# Patient Record
Sex: MALE | Race: BLACK OR AFRICAN AMERICAN | NOT HISPANIC OR LATINO
[De-identification: names, ages, dates, MRNs, and addresses within clinical notes are randomized per-mention and may not be internally consistent; named-entity substitution may affect disease eponyms.]

---

## 2017-05-23 PROBLEM — Z00.00 ENCOUNTER FOR PREVENTIVE HEALTH EXAMINATION: Status: ACTIVE | Noted: 2017-05-23

## 2017-06-23 ENCOUNTER — APPOINTMENT (OUTPATIENT)
Dept: SURGERY | Facility: CLINIC | Age: 82
End: 2017-06-23

## 2017-06-23 VITALS
TEMPERATURE: 98 F | HEIGHT: 68 IN | WEIGHT: 154 LBS | OXYGEN SATURATION: 97 % | BODY MASS INDEX: 23.34 KG/M2 | HEART RATE: 66 BPM | DIASTOLIC BLOOD PRESSURE: 75 MMHG | SYSTOLIC BLOOD PRESSURE: 117 MMHG

## 2017-06-23 DIAGNOSIS — Z87.891 PERSONAL HISTORY OF NICOTINE DEPENDENCE: ICD-10-CM

## 2017-12-15 ENCOUNTER — APPOINTMENT (OUTPATIENT)
Dept: SURGERY | Facility: CLINIC | Age: 82
End: 2017-12-15
Payer: MEDICARE

## 2017-12-15 VITALS
BODY MASS INDEX: 23.41 KG/M2 | TEMPERATURE: 97.7 F | SYSTOLIC BLOOD PRESSURE: 138 MMHG | DIASTOLIC BLOOD PRESSURE: 88 MMHG | WEIGHT: 154.44 LBS | HEART RATE: 72 BPM | OXYGEN SATURATION: 96 % | HEIGHT: 68 IN

## 2017-12-15 PROCEDURE — 99213 OFFICE O/P EST LOW 20 MIN: CPT

## 2018-01-26 ENCOUNTER — APPOINTMENT (OUTPATIENT)
Dept: CT IMAGING | Facility: HOSPITAL | Age: 83
End: 2018-01-26

## 2018-01-26 ENCOUNTER — OUTPATIENT (OUTPATIENT)
Dept: OUTPATIENT SERVICES | Facility: HOSPITAL | Age: 83
LOS: 1 days | End: 2018-01-26
Payer: MEDICARE

## 2018-01-26 PROCEDURE — 71275 CT ANGIOGRAPHY CHEST: CPT

## 2018-01-26 PROCEDURE — 71275 CT ANGIOGRAPHY CHEST: CPT | Mod: 26

## 2018-01-26 PROCEDURE — 74174 CTA ABD&PLVS W/CONTRAST: CPT

## 2018-01-26 PROCEDURE — 74174 CTA ABD&PLVS W/CONTRAST: CPT | Mod: 26

## 2018-04-03 ENCOUNTER — APPOINTMENT (OUTPATIENT)
Dept: CT IMAGING | Facility: HOSPITAL | Age: 83
End: 2018-04-03
Payer: MEDICARE

## 2018-04-03 ENCOUNTER — OUTPATIENT (OUTPATIENT)
Dept: OUTPATIENT SERVICES | Facility: HOSPITAL | Age: 83
LOS: 1 days | End: 2018-04-03
Payer: MEDICARE

## 2018-04-03 PROCEDURE — 71275 CT ANGIOGRAPHY CHEST: CPT

## 2018-04-03 PROCEDURE — 71275 CT ANGIOGRAPHY CHEST: CPT | Mod: 26

## 2018-05-09 ENCOUNTER — FORM ENCOUNTER (OUTPATIENT)
Age: 83
End: 2018-05-09

## 2018-05-09 ENCOUNTER — INPATIENT (INPATIENT)
Facility: HOSPITAL | Age: 83
LOS: 3 days | Discharge: HOME | DRG: 378 | End: 2018-05-13
Attending: SURGERY | Admitting: SURGERY
Payer: MEDICARE

## 2018-05-09 VITALS
HEART RATE: 94 BPM | DIASTOLIC BLOOD PRESSURE: 74 MMHG | TEMPERATURE: 98 F | WEIGHT: 151.9 LBS | SYSTOLIC BLOOD PRESSURE: 134 MMHG | OXYGEN SATURATION: 98 % | RESPIRATION RATE: 14 BRPM

## 2018-05-09 LAB
CK MB CFR SERPL CALC: 5.4 NG/ML — SIGNIFICANT CHANGE UP (ref 0–6.7)
CK SERPL-CCNC: 276 U/L — HIGH (ref 30–200)
HCT VFR BLD CALC: 26.8 % — LOW (ref 39–50)
HCT VFR BLD CALC: 27.1 % — LOW (ref 39–50)
HGB BLD-MCNC: 8.8 G/DL — LOW (ref 13–17)
HGB BLD-MCNC: 9 G/DL — LOW (ref 13–17)
MCHC RBC-ENTMCNC: 31.9 PG — SIGNIFICANT CHANGE UP (ref 27–34)
MCHC RBC-ENTMCNC: 32 PG — SIGNIFICANT CHANGE UP (ref 27–34)
MCHC RBC-ENTMCNC: 32.5 G/DL — SIGNIFICANT CHANGE UP (ref 32–36)
MCHC RBC-ENTMCNC: 33.6 G/DL — SIGNIFICANT CHANGE UP (ref 32–36)
MCV RBC AUTO: 95 FL — SIGNIFICANT CHANGE UP (ref 80–100)
MCV RBC AUTO: 98.5 FL — SIGNIFICANT CHANGE UP (ref 80–100)
NT-PROBNP SERPL-SCNC: 47 PG/ML — SIGNIFICANT CHANGE UP (ref 0–300)
PLATELET # BLD AUTO: 157 K/UL — SIGNIFICANT CHANGE UP (ref 150–400)
PLATELET # BLD AUTO: 161 K/UL — SIGNIFICANT CHANGE UP (ref 150–400)
RBC # BLD: 2.75 M/UL — LOW (ref 4.2–5.8)
RBC # BLD: 2.82 M/UL — LOW (ref 4.2–5.8)
RBC # FLD: 14.1 % — SIGNIFICANT CHANGE UP (ref 10.3–16.9)
RBC # FLD: 14.2 % — SIGNIFICANT CHANGE UP (ref 10.3–16.9)
TROPONIN T SERPL-MCNC: <0.01 NG/ML — SIGNIFICANT CHANGE UP (ref 0–0.01)
WBC # BLD: 10.8 K/UL — HIGH (ref 3.8–10.5)
WBC # BLD: 11.2 K/UL — HIGH (ref 3.8–10.5)
WBC # FLD AUTO: 10.8 K/UL — HIGH (ref 3.8–10.5)
WBC # FLD AUTO: 11.2 K/UL — HIGH (ref 3.8–10.5)

## 2018-05-09 PROCEDURE — 71045 X-RAY EXAM CHEST 1 VIEW: CPT | Mod: 26

## 2018-05-09 PROCEDURE — 93010 ELECTROCARDIOGRAM REPORT: CPT | Mod: 77

## 2018-05-09 PROCEDURE — 93010 ELECTROCARDIOGRAM REPORT: CPT

## 2018-05-09 PROCEDURE — 99285 EMERGENCY DEPT VISIT HI MDM: CPT | Mod: 25

## 2018-05-09 RX ORDER — SOD SULF/SODIUM/NAHCO3/KCL/PEG
4000 SOLUTION, RECONSTITUTED, ORAL ORAL ONCE
Qty: 0 | Refills: 0 | Status: COMPLETED | OUTPATIENT
Start: 2018-05-09 | End: 2018-05-09

## 2018-05-09 RX ORDER — SODIUM CHLORIDE 9 MG/ML
1000 INJECTION INTRAMUSCULAR; INTRAVENOUS; SUBCUTANEOUS ONCE
Qty: 0 | Refills: 0 | Status: COMPLETED | OUTPATIENT
Start: 2018-05-09 | End: 2018-05-09

## 2018-05-09 RX ORDER — ONDANSETRON 8 MG/1
4 TABLET, FILM COATED ORAL EVERY 6 HOURS
Qty: 0 | Refills: 0 | Status: DISCONTINUED | OUTPATIENT
Start: 2018-05-09 | End: 2018-05-13

## 2018-05-09 RX ORDER — SODIUM CHLORIDE 9 MG/ML
1000 INJECTION INTRAMUSCULAR; INTRAVENOUS; SUBCUTANEOUS
Qty: 0 | Refills: 0 | Status: DISCONTINUED | OUTPATIENT
Start: 2018-05-09 | End: 2018-05-12

## 2018-05-09 RX ORDER — SOD SULF/SODIUM/NAHCO3/KCL/PEG
4000 SOLUTION, RECONSTITUTED, ORAL ORAL ONCE
Qty: 0 | Refills: 0 | Status: DISCONTINUED | OUTPATIENT
Start: 2018-05-09 | End: 2018-05-09

## 2018-05-09 RX ORDER — AMLODIPINE BESYLATE 2.5 MG/1
5 TABLET ORAL DAILY
Qty: 0 | Refills: 0 | Status: DISCONTINUED | OUTPATIENT
Start: 2018-05-09 | End: 2018-05-13

## 2018-05-09 RX ADMIN — Medication 4000 MILLILITER(S): at 20:55

## 2018-05-09 RX ADMIN — SODIUM CHLORIDE 1000 MILLILITER(S): 9 INJECTION INTRAMUSCULAR; INTRAVENOUS; SUBCUTANEOUS at 12:37

## 2018-05-09 NOTE — PATIENT PROFILE ADULT. - TEACHING/LEARNING FACTORS INFLUENCE READINESS TO LEARN
I called patient's pharmacy about the 3 faxes they sent us regarding Dexilant not being covered. Pharmacist said to disregard these. They were able to get Dexilant to go through without any charge.    none

## 2018-05-09 NOTE — CONSULT NOTE ADULT - SUBJECTIVE AND OBJECTIVE BOX
84M with a h/o GERALD, HTN, left inguinal hernia (pending surgery with Dr. Dale in 6 months), cataracts s/p surgery, h/o internal hemorrhoids, on baby 81 daily who p/w 2 episodes of rectal bleeding this morning,          REVIEW OF SYSTEMS:  Constitutional: No fever, weight loss or fatigue  ENMT:  No difficulty hearing, tinnitus, vertigo; No sinus or throat pain  Respiratory: No cough, wheezing, chills or hemoptysis  Cardiovascular: No chest pain, palpitations, dizziness or leg swelling  Gastrointestinal: No abdominal or epigastric pain. No nausea, vomiting or hematemesis; No diarrhea or constipation. No melena or hematochezia.  Skin: No itching, burning, rashes or lesions   Musculoskeletal: No joint pain or swelling; No muscle, back or extremity pain    PAST MEDICAL & SURGICAL HISTORY:  Sleep apnea  Hernia  Hemorrhoids  Hypertension      FAMILY HISTORY:      SOCIAL HISTORY:  Smoking Status: [ ] Current, [ ] Former, [ ] Never  Pack Years:    MEDICATIONS:  MEDICATIONS  (STANDING):    MEDICATIONS  (PRN):      Allergies    No Known Allergies    Intolerances        Vital Signs Last 24 Hrs  T(C): 36.7 (09 May 2018 13:17), Max: 36.7 (09 May 2018 13:17)  T(F): 98.1 (09 May 2018 13:17), Max: 98.1 (09 May 2018 13:17)  HR: 64 (09 May 2018 13:17) (64 - 94)  BP: 120/71 (09 May 2018 13:17) (120/71 - 134/74)  BP(mean): --  RR: 16 (09 May 2018 13:17) (14 - 16)  SpO2: 98% (09 May 2018 13:17) (98% - 98%)        PHYSICAL EXAM:    General: Well developed; well nourished; in no acute distress  HEENT: MMM, conjunctiva and sclera clear  Gastrointestinal: Soft, non-tender non-distended; Normal bowel sounds; No rebound or guarding  Extremities: Normal range of motion, No clubbing, cyanosis or edema  Neurological: Alert and oriented x3  Skin: Warm and dry. No obvious rash      LABS:                        9.0    10.8  )-----------( 157      ( 09 May 2018 13:16 )             26.8     05-09    141  |  102  |  18  ----------------------------<  122<H>  5.1   |  27  |  0.88    Ca    9.1      09 May 2018 10:56    TPro  5.9<L>  /  Alb  3.7  /  TBili  0.4  /  DBili  x   /  AST  21  /  ALT  16  /  AlkPhos  53  05-09        RADIOLOGY & ADDITIONAL STUDIES: 84M with a h/o GERALD, HTN, left inguinal hernia (pending surgery with Dr. Dale in 6 months), cataracts s/p surgery, h/o internal hemorrhoids, on baby 81 daily who p/w 2 episodes of rectal bleeding this morning,    Patient reports 2 episodes of BRBPR this morning, first with stool and then blood without stool. This event was in proximity to episode of lightheadedness. He did not develop abdominal pain or n/v at any time during this.   Of note he reports intermittent light headedness since starting metoprolol recently. He had a colonoscopy within the last year for a similar episode that was much less sever and without lgihtheadedness. That exam showed hemorrhoids only, and no intervention was done. He currently feels well after transfusion.       REVIEW OF SYSTEMS:  Constitutional: No fever, weight loss or fatigue  ENMT:  No difficulty hearing, tinnitus, vertigo; No sinus or throat pain  Respiratory: No cough, wheezing, chills or hemoptysis  Cardiovascular: No chest pain, palpitations, dizziness or leg swelling  Gastrointestinal: No abdominal or epigastric pain. No nausea, vomiting or hematemesis; No diarrhea or constipation. No melena   Skin: No itching, burning, rashes or lesions   Musculoskeletal: No joint pain or swelling; No muscle, back or extremity pain    PAST MEDICAL & SURGICAL HISTORY:  Sleep apnea  Hernia  Hemorrhoids  Hypertension      FAMILY HISTORY:      SOCIAL HISTORY:  Smoking Status: [ ] Current, [ ] Former, [ ] Never  Pack Years:    MEDICATIONS:  MEDICATIONS  (STANDING):    MEDICATIONS  (PRN):      Allergies    No Known Allergies    Intolerances        Vital Signs Last 24 Hrs  T(C): 36.7 (09 May 2018 13:17), Max: 36.7 (09 May 2018 13:17)  T(F): 98.1 (09 May 2018 13:17), Max: 98.1 (09 May 2018 13:17)  HR: 64 (09 May 2018 13:17) (64 - 94)  BP: 120/71 (09 May 2018 13:17) (120/71 - 134/74)  BP(mean): --  RR: 16 (09 May 2018 13:17) (14 - 16)  SpO2: 98% (09 May 2018 13:17) (98% - 98%)        PHYSICAL EXAM:    General: Well developed; well nourished; in no acute distress  HEENT: MMM, conjunctiva and sclera clear  Gastrointestinal: Soft, non-tender non-distended; Normal bowel sounds; No rebound or guarding  rectal - mucous with small amount of red blood  Extremities: Normal range of motion, No clubbing, cyanosis or edema  Neurological: Alert and oriented x3  Skin: Warm and dry. No obvious rash      LABS:                        9.0    10.8  )-----------( 157      ( 09 May 2018 13:16 )             26.8     05-09    141  |  102  |  18  ----------------------------<  122<H>  5.1   |  27  |  0.88    Ca    9.1      09 May 2018 10:56    TPro  5.9<L>  /  Alb  3.7  /  TBili  0.4  /  DBili  x   /  AST  21  /  ALT  16  /  AlkPhos  53  05-09        RADIOLOGY & ADDITIONAL STUDIES:

## 2018-05-09 NOTE — H&P ADULT - NSHPPHYSICALEXAM_GEN_ALL_CORE
Vital Signs Last 24 Hrs  T(C): 36.7 (09 May 2018 13:17), Max: 36.7 (09 May 2018 13:17)  T(F): 98.1 (09 May 2018 13:17), Max: 98.1 (09 May 2018 13:17)  HR: 64 (09 May 2018 13:17) (64 - 94)  BP: 120/71 (09 May 2018 13:17) (120/71 - 134/74)  BP(mean): --  RR: 16 (09 May 2018 13:17) (14 - 16)  SpO2: 98% (09 May 2018 13:17) (98% - 98%)    Gen: Age appropriate male in NAD  CV: RRR  Resp: No increased work of breathing  Abd: Softly distended but nontender  Rectal: No external hemorrhoids, fissures appreciated. Sphincter tone WNL. No internal masses appreciated. Nontender ALAN. No blood on gloved finger.  Ext: WWP

## 2018-05-09 NOTE — H&P ADULT - NSHPLABSRESULTS_GEN_ALL_CORE
9.0    10.8  )-----------( 157      ( 09 May 2018 13:16 )             26.8   05-09    141  |  102  |  18  ----------------------------<  122<H>  5.1   |  27  |  0.88    Ca    9.1      09 May 2018 10:56    TPro  5.9<L>  /  Alb  3.7  /  TBili  0.4  /  DBili  x   /  AST  21  /  ALT  16  /  AlkPhos  53  05-09

## 2018-05-09 NOTE — ED PROVIDER NOTE - OBJECTIVE STATEMENT
84M with a h/o GERALD< HTN, left inguinal hernia (pending surgery with Dr. Dale in 6 months), cataracts s/p surgery, h/o internal bleeding, no baby 81 daily who p/w 2 episodes of rectal bleeding this morning, first was stool mixed with blood and second was all blood, large volume, bright red mixed with maroon stool. He also felt dizzy and Lh today and the doorman reports he fell against the wall when he was walking on the lobby today prior to having 2nd bloody BM. No CP, no n/v. He does admit to feeling more LH and SOb in the last month since being started on Metoprolol 1 month ago by his cardiologist.   GI: Teja Perkins, [place], Dr. Puckett, PMD 84M with a h/o GERALD< HTN, left inguinal hernia (pending surgery with Dr. Dale in 6 months), cataracts s/p surgery, h/o internal hemorrhoids, on baby 81 daily who p/w 2 episodes of rectal bleeding this morning, first was stool mixed with blood and second was all blood, large volume, bright red mixed with maroon stool. He also felt dizzy and Lh today and the doorman reports he fell against the wall when he was walking on the lobby today prior to having 2nd bloody BM. No CP, no n/v. He does admit to feeling more LH and SOb in the last month since being started on Metoprolol 1 month ago by his cardiologist.   GI: Teja Perkins, [place], Dr. Puckett, PMD 84M with a h/o GERALD, HTN, left inguinal hernia (pending surgery with Dr. Dale in 6 months), cataracts s/p surgery, h/o internal hemorrhoids, on baby 81 daily who p/w 2 episodes of rectal bleeding this morning, first was stool mixed with blood and second was all blood, large volume, bright red mixed with maroon stool. He also felt dizzy and Lh today and the doorman reports he fell against the wall when he was walking on the lobby today prior to having 2nd bloody BM. No CP, NO abd pain, no n/v, He does admit to feeling more LH and SOb in the last month since being started on Metoprolol 1 month ago by his cardiologist.   GI: Teja Perkins, [place], Dr. Puckett, PMD

## 2018-05-09 NOTE — CONSULT NOTE ADULT - ASSESSMENT
84M with a h/o GERALD, HTN, left inguinal hernia (pending surgery with Dr. Dale in 6 months), cataracts s/p surgery, h/o internal hemorrhoids, on baby 81 daily who p/w 2 episodes of rectal bleeding this morning,    #brbpr - patient has a history of rectal bleeding from hemorrhoids, per previous colonoscopy report. this ervent was more signProximity of initiating metoprolol wiuth episodes of lightheadedness may represent a cause for ischemic colitis, however patient is not having abdominal pain. The degree of his anemia warrants endoscopic evaluation. absence of melena, relative stability and normal BUN are evidence against upper GI etiolgoy  -golytely prep and NPO after midnight for colonoscopy tomorrow  -repeat CBC 84M with a h/o GERALD, HTN, left inguinal hernia (pending surgery with Dr. Dale in 6 months), cataracts s/p surgery, h/o internal hemorrhoids, on baby 81 daily who p/w 2 episodes of rectal bleeding this morning,    #brbpr - patient has a history of rectal bleeding from hemorrhoids, per previous colonoscopy report. this ervent was more signProximity of initiating metoprolol wiuth episodes of lightheadedness may represent a cause for ischemic colitis, however patient is not having abdominal pain. The degree of his anemia warrants endoscopic evaluation. absence of melena, relative stability and normal BUN are evidence against upper GI etiolgoy  -golytely prep and NPO after midnight for colonoscopy tomorrow  -repeat CBC  -obtain records for outside CBC and c-scope

## 2018-05-09 NOTE — H&P ADULT - HISTORY OF PRESENT ILLNESS
84M with a PMHx of GERALD (uses CPAP at home), HTN, cataracts, COPD, Diverticulosis, Left inguinal hernia presenting to the [facility] ED with two episodes of hematochezia this morning.  Pt reports two episodes of primarily maroon colored stool at 6AM and 10AM today.  Pt was then brought in by his daughter for further evaluation.  He does note dizziness upon standing, but denies any chest pain, sob, nausea, vomiting, dysuria, constipation.  Pt denies any CAD hx.    PMH: HTN, GERALD, COPD, Diverticulosis, emphysema  Meds: ASA, timolol BID, amlodipine 5mg qd, Metoprolol ER 25mg qd (No longer taking)  PSH: No intraabdominal surgery  NKDA  Social: Remote smoking hx, denies ETOH/IVDA

## 2018-05-09 NOTE — H&P ADULT - ASSESSMENT
84M with hx of BRBPR and symptomatic anemia.    - Will admit to surgical service for observation.  - CLD this evening and mechanical prep for scope tomorrow with GI.  - Will continue home timolol eye drops, and home amlodipine, but will hold metoprolol  - Serial CBCs  - SCDs/OOBA  - Discussed with Dr. Hernandez.

## 2018-05-09 NOTE — ED PROVIDER NOTE - MEDICAL DECISION MAKING DETAILS
84M with above past medical hx who p/w rectal bleeding. VSS, no abdominal TTP, rectal with dark red blood, no active hemorrage. Will work up with labs, EKG, GI and surg consult. Likely admit.

## 2018-05-09 NOTE — ED PROVIDER NOTE - PHYSICAL EXAMINATION
GEN: Well appearing, well nourished, awake, alert, oriented to person, place, time/situation and in no apparent distress.  ENT: Airway patent, Nasal mucosa clear. Mouth with normal mucosa.  EYES: Clear bilaterally.  RESPIRATORY: Breathing comfortably with normal RR. No w/c/r.  CARDIAC: Regular rate and rhythm, no m/r/g.  ABDOMEN: Soft, nontender, +bowel sounds, no rebound, rigidity, or guarding. Nonsurgical ab, no incarcerated hernia palpated.  RECTAL: dark red blood on gloved finger, no active hemorrhage.   MSK: Range of motion is not limited, no deformities noted.  NEURO: Alert and oriented, no focal deficits.  SKIN: Skin normal color for race, warm, dry and intact. No evidence of rash.  PSYCH: Alert and oriented to person, place, time/situation. normal mood and affect. no apparent risk to self or others.

## 2018-05-09 NOTE — ED ADULT NURSE NOTE - OBJECTIVE STATEMENT
pt had 2 episodes of bloody diarrhea this am ,also having episodes of dizziness on and off x 1 month ,also had near syncopal episodes this am and yesterday

## 2018-05-10 LAB
ANION GAP SERPL CALC-SCNC: 10 MMOL/L — SIGNIFICANT CHANGE UP (ref 5–17)
APTT BLD: 25 SEC — LOW (ref 27.5–37.4)
BUN SERPL-MCNC: 10 MG/DL — SIGNIFICANT CHANGE UP (ref 7–23)
CALCIUM SERPL-MCNC: 8.4 MG/DL — SIGNIFICANT CHANGE UP (ref 8.4–10.5)
CHLORIDE SERPL-SCNC: 104 MMOL/L — SIGNIFICANT CHANGE UP (ref 96–108)
CO2 SERPL-SCNC: 29 MMOL/L — SIGNIFICANT CHANGE UP (ref 22–31)
CREAT SERPL-MCNC: 0.78 MG/DL — SIGNIFICANT CHANGE UP (ref 0.5–1.3)
GLUCOSE SERPL-MCNC: 120 MG/DL — HIGH (ref 70–99)
HCT VFR BLD CALC: 23.2 % — LOW (ref 39–50)
HCT VFR BLD CALC: 26.5 % — LOW (ref 39–50)
HGB BLD-MCNC: 7.4 G/DL — LOW (ref 13–17)
HGB BLD-MCNC: 8.6 G/DL — LOW (ref 13–17)
MAGNESIUM SERPL-MCNC: 1.8 MG/DL — SIGNIFICANT CHANGE UP (ref 1.6–2.6)
MCHC RBC-ENTMCNC: 31.4 PG — SIGNIFICANT CHANGE UP (ref 27–34)
MCHC RBC-ENTMCNC: 31.6 PG — SIGNIFICANT CHANGE UP (ref 27–34)
MCHC RBC-ENTMCNC: 31.9 G/DL — LOW (ref 32–36)
MCHC RBC-ENTMCNC: 32.5 G/DL — SIGNIFICANT CHANGE UP (ref 32–36)
MCV RBC AUTO: 96.7 FL — SIGNIFICANT CHANGE UP (ref 80–100)
MCV RBC AUTO: 99.1 FL — SIGNIFICANT CHANGE UP (ref 80–100)
PHOSPHATE SERPL-MCNC: 3.2 MG/DL — SIGNIFICANT CHANGE UP (ref 2.5–4.5)
PLATELET # BLD AUTO: 134 K/UL — LOW (ref 150–400)
PLATELET # BLD AUTO: 144 K/UL — LOW (ref 150–400)
POTASSIUM SERPL-MCNC: 4.2 MMOL/L — SIGNIFICANT CHANGE UP (ref 3.5–5.3)
POTASSIUM SERPL-SCNC: 4.2 MMOL/L — SIGNIFICANT CHANGE UP (ref 3.5–5.3)
RBC # BLD: 2.34 M/UL — LOW (ref 4.2–5.8)
RBC # BLD: 2.74 M/UL — LOW (ref 4.2–5.8)
RBC # FLD: 14.4 % — SIGNIFICANT CHANGE UP (ref 10.3–16.9)
RBC # FLD: 15.3 % — SIGNIFICANT CHANGE UP (ref 10.3–16.9)
SODIUM SERPL-SCNC: 143 MMOL/L — SIGNIFICANT CHANGE UP (ref 135–145)
WBC # BLD: 10 K/UL — SIGNIFICANT CHANGE UP (ref 3.8–10.5)
WBC # BLD: 9.4 K/UL — SIGNIFICANT CHANGE UP (ref 3.8–10.5)
WBC # FLD AUTO: 10 K/UL — SIGNIFICANT CHANGE UP (ref 3.8–10.5)
WBC # FLD AUTO: 9.4 K/UL — SIGNIFICANT CHANGE UP (ref 3.8–10.5)

## 2018-05-10 NOTE — PROGRESS NOTE ADULT - ASSESSMENT
84M with hx of BRBPR and symptomatic anemia.    - Will admit to surgical service for observation.  - CLD   - mechanical prep for scope tomorrow with GI.  - Will continue home timolol eye drops, and home amlodipine, but will hold metoprolol  - Serial CBCs  - SCDs/OOBA 84M with hx of BRBPR and symptomatic anemia.    - Will admit to surgical service for observation.  - NPO for procedure  - mechanical prep for scope today with GI.  - Will continue home timolol eye drops, and home amlodipine, but will hold metoprolol  - Serial CBCs  - SCDs/OOBA

## 2018-05-10 NOTE — PROGRESS NOTE ADULT - SUBJECTIVE AND OBJECTIVE BOX
O/N: golytely given, NPO after midnight, CBC stable  5/9: admitted for rectal bleeding O/N: golytely given, NPO after midnight, CBC stable  5/9: admitted for rectal bleeding      SUBJECTIVE: Patient seen and examined bedside by chief resident. Pt admits to bright red blood per rectum throughout the night. he has drank the golytely in anticipation for scope today. denies nausea, vomiting, abdominal pain, sob, fever     amLODIPine   Tablet 5 milliGRAM(s) Oral daily      Vital Signs Last 24 Hrs  T(C): 37.1 (10 May 2018 05:11), Max: 37.1 (10 May 2018 05:11)  T(F): 98.7 (10 May 2018 05:11), Max: 98.7 (10 May 2018 05:11)  HR: 85 (10 May 2018 06:00) (64 - 98)  BP: 111/57 (10 May 2018 05:11) (111/57 - 134/74)  BP(mean): --  RR: 14 (10 May 2018 06:00) (14 - 17)  SpO2: 96% (10 May 2018 06:00) (96% - 100%)  I&O's Detail      General: NAD, resting comfortably in bed  C/V: NSR  Pulm: Nonlabored breathing, no respiratory distress  Abd: soft, NT/ND  Extrem: WWP, no edema, SCDs in place        LABS:                        8.8    11.2  )-----------( 161      ( 09 May 2018 20:31 )             27.1     05-09    141  |  102  |  18  ----------------------------<  122<H>  5.1   |  27  |  0.88    Ca    9.1      09 May 2018 10:56    TPro  5.9<L>  /  Alb  3.7  /  TBili  0.4  /  DBili  x   /  AST  21  /  ALT  16  /  AlkPhos  53  05-09    PT/INR - ( 09 May 2018 10:56 )   PT: 11.8 sec;   INR: 1.06          PTT - ( 10 May 2018 07:03 )  PTT:25.0 sec

## 2018-05-11 LAB
ANION GAP SERPL CALC-SCNC: 9 MMOL/L — SIGNIFICANT CHANGE UP (ref 5–17)
BUN SERPL-MCNC: 9 MG/DL — SIGNIFICANT CHANGE UP (ref 7–23)
CALCIUM SERPL-MCNC: 8.2 MG/DL — LOW (ref 8.4–10.5)
CHLORIDE SERPL-SCNC: 107 MMOL/L — SIGNIFICANT CHANGE UP (ref 96–108)
CO2 SERPL-SCNC: 26 MMOL/L — SIGNIFICANT CHANGE UP (ref 22–31)
CREAT SERPL-MCNC: 0.81 MG/DL — SIGNIFICANT CHANGE UP (ref 0.5–1.3)
GLUCOSE SERPL-MCNC: 119 MG/DL — HIGH (ref 70–99)
HCT VFR BLD CALC: 23.2 % — LOW (ref 39–50)
HCT VFR BLD CALC: 32.2 % — LOW (ref 39–50)
HGB BLD-MCNC: 10.9 G/DL — LOW (ref 13–17)
HGB BLD-MCNC: 7.8 G/DL — LOW (ref 13–17)
MAGNESIUM SERPL-MCNC: 1.9 MG/DL — SIGNIFICANT CHANGE UP (ref 1.6–2.6)
MCHC RBC-ENTMCNC: 31.3 PG — SIGNIFICANT CHANGE UP (ref 27–34)
MCHC RBC-ENTMCNC: 31.7 PG — SIGNIFICANT CHANGE UP (ref 27–34)
MCHC RBC-ENTMCNC: 33.6 G/DL — SIGNIFICANT CHANGE UP (ref 32–36)
MCHC RBC-ENTMCNC: 33.9 G/DL — SIGNIFICANT CHANGE UP (ref 32–36)
MCV RBC AUTO: 92.5 FL — SIGNIFICANT CHANGE UP (ref 80–100)
MCV RBC AUTO: 94.3 FL — SIGNIFICANT CHANGE UP (ref 80–100)
PHOSPHATE SERPL-MCNC: 3.3 MG/DL — SIGNIFICANT CHANGE UP (ref 2.5–4.5)
PLATELET # BLD AUTO: 135 K/UL — LOW (ref 150–400)
PLATELET # BLD AUTO: 135 K/UL — LOW (ref 150–400)
POTASSIUM SERPL-MCNC: 4.3 MMOL/L — SIGNIFICANT CHANGE UP (ref 3.5–5.3)
POTASSIUM SERPL-SCNC: 4.3 MMOL/L — SIGNIFICANT CHANGE UP (ref 3.5–5.3)
RBC # BLD: 2.46 M/UL — LOW (ref 4.2–5.8)
RBC # BLD: 3.48 M/UL — LOW (ref 4.2–5.8)
RBC # FLD: 15.7 % — SIGNIFICANT CHANGE UP (ref 10.3–16.9)
RBC # FLD: 16 % — SIGNIFICANT CHANGE UP (ref 10.3–16.9)
SODIUM SERPL-SCNC: 142 MMOL/L — SIGNIFICANT CHANGE UP (ref 135–145)
WBC # BLD: 11.1 K/UL — HIGH (ref 3.8–10.5)
WBC # BLD: 9 K/UL — SIGNIFICANT CHANGE UP (ref 3.8–10.5)
WBC # FLD AUTO: 11.1 K/UL — HIGH (ref 3.8–10.5)
WBC # FLD AUTO: 9 K/UL — SIGNIFICANT CHANGE UP (ref 3.8–10.5)

## 2018-05-11 RX ADMIN — AMLODIPINE BESYLATE 5 MILLIGRAM(S): 2.5 TABLET ORAL at 05:46

## 2018-05-11 NOTE — PROGRESS NOTE ADULT - ASSESSMENT
84M with hx of BRBPR and symptomatic anemia.    - Will admit to surgical service for observation.  - regualr diet   - scope today 5/10.  - Will continue home timolol eye drops, and home amlodipine, but will hold metoprolol  - Serial CBCs  - SCDs/OOBA 84M with hx of BRBPR and symptomatic anemia.    - NPO  - scoped on 5/10  - bleeding scan today 5/11  - Will continue home timolol eye drops, and home amlodipine, but will hold metoprolol  - Serial CBCs  - SCDs/OOBA

## 2018-05-11 NOTE — PROGRESS NOTE ADULT - ASSESSMENT
84M with a h/o GERALD, HTN, left inguinal hernia (pending surgery with Dr. Dale in 6 months), cataracts s/p surgery, h/o internal hemorrhoids, on baby 81 daily who p/w 2 episodes of rectal bleeding this morning,    #brbpr - patient has a history of rectal bleeding from hemorrhoids, per previous colonoscopy report. this ervent was more signProximity of initiating metoprolol wiuth episodes of lightheadedness may represent a cause for ischemic colitis, however patient is not having abdominal pain. absence of melena, relative stability and normal BUN are evidence against upper GI etiolgoy  s/p Colonsocopy showing multiple large mouthed diverticula in descending, transverse and ascending colon with clots, but no active bleeding after washing  overnight had recurrent bleeding (red blood), likely recurrent diverticular bleed. Team ordered bleeding scan, but if intermittent may not show  -f/u bleeding scan results  -consider IR evaluation with scrutiny of right colon based on degree of clots there and size of diverticula if bleeding scan is unrevealing

## 2018-05-11 NOTE — PROGRESS NOTE ADULT - SUBJECTIVE AND OBJECTIVE BOX
Pt seen and examined   patient had painless bleeding last night.   no abd pain or n/v, feeling well; getting blood now    REVIEW OF SYSTEMS:  Constitutional: No fever, weight loss or fatigue  Cardiovascular: No chest pain, palpitations, dizziness or leg swelling  Gastrointestinal: No abdominal or epigastric pain. No nausea, vomiting or hematemesis; No diarrhea or constipation. No melena or hematochezia.  Skin: No itching, burning, rashes or lesions       MEDICATIONS:  MEDICATIONS  (STANDING):  amLODIPine   Tablet 5 milliGRAM(s) Oral daily  sodium chloride 0.9%. 1000 milliLiter(s) (110 mL/Hr) IV Continuous <Continuous>    MEDICATIONS  (PRN):  ondansetron Injectable 4 milliGRAM(s) IV Push every 6 hours PRN Nausea      Allergies    No Known Allergies    Intolerances        Vital Signs Last 24 Hrs  T(C): 36.8 (11 May 2018 09:45), Max: 37.1 (10 May 2018 20:27)  T(F): 98.3 (11 May 2018 09:45), Max: 98.7 (10 May 2018 20:27)  HR: 74 (11 May 2018 09:45) (70 - 95)  BP: 111/63 (11 May 2018 09:45) (93/75 - 115/69)  BP(mean): --  RR: 16 (11 May 2018 09:45) (14 - 16)  SpO2: 98% (11 May 2018 09:45) (95% - 100%)    05-10 @ 07:01  -  05-11 @ 07:00  --------------------------------------------------------  IN: 1220 mL / OUT: 1050 mL / NET: 170 mL        PHYSICAL EXAM:    General: Well developed; well nourished; in no acute distress  HEENT: MMM, conjunctiva and sclera clear  Gastrointestinal: Soft non-tender non-distended; Normal bowel sounds; No hepatosplenomegaly. No rebound or guarding    Skin: Warm and dry. No obvious rash    LABS:  CBC Full  -  ( 11 May 2018 07:17 )  WBC Count : 9.0 K/uL  Hemoglobin : 7.8 g/dL  Hematocrit : 23.2 %  Platelet Count - Automated : 135 K/uL  Mean Cell Volume : 94.3 fL  Mean Cell Hemoglobin : 31.7 pg  Mean Cell Hemoglobin Concentration : 33.6 g/dL  Auto Neutrophil # : x  Auto Lymphocyte # : x  Auto Monocyte # : x  Auto Eosinophil # : x  Auto Basophil # : x  Auto Neutrophil % : x  Auto Lymphocyte % : x  Auto Monocyte % : x  Auto Eosinophil % : x  Auto Basophil % : x    05-11    142  |  107  |  9   ----------------------------<  119<H>  4.3   |  26  |  0.81    Ca    8.2<L>      11 May 2018 07:17  Phos  3.3     05-11  Mg     1.9     05-11      PTT - ( 10 May 2018 07:03 )  PTT:25.0 sec                RADIOLOGY & ADDITIONAL STUDIES:

## 2018-05-12 LAB
ANION GAP SERPL CALC-SCNC: 13 MMOL/L — SIGNIFICANT CHANGE UP (ref 5–17)
BUN SERPL-MCNC: 9 MG/DL — SIGNIFICANT CHANGE UP (ref 7–23)
CALCIUM SERPL-MCNC: 9 MG/DL — SIGNIFICANT CHANGE UP (ref 8.4–10.5)
CHLORIDE SERPL-SCNC: 104 MMOL/L — SIGNIFICANT CHANGE UP (ref 96–108)
CO2 SERPL-SCNC: 26 MMOL/L — SIGNIFICANT CHANGE UP (ref 22–31)
CREAT SERPL-MCNC: 0.82 MG/DL — SIGNIFICANT CHANGE UP (ref 0.5–1.3)
GLUCOSE SERPL-MCNC: 102 MG/DL — HIGH (ref 70–99)
HCT VFR BLD CALC: 31.4 % — LOW (ref 39–50)
HCT VFR BLD CALC: 34.9 % — LOW (ref 39–50)
HGB BLD-MCNC: 10.2 G/DL — LOW (ref 13–17)
HGB BLD-MCNC: 11.7 G/DL — LOW (ref 13–17)
MAGNESIUM SERPL-MCNC: 1.9 MG/DL — SIGNIFICANT CHANGE UP (ref 1.6–2.6)
MCHC RBC-ENTMCNC: 30.9 PG — SIGNIFICANT CHANGE UP (ref 27–34)
MCHC RBC-ENTMCNC: 31.4 PG — SIGNIFICANT CHANGE UP (ref 27–34)
MCHC RBC-ENTMCNC: 32.5 G/DL — SIGNIFICANT CHANGE UP (ref 32–36)
MCHC RBC-ENTMCNC: 33.5 G/DL — SIGNIFICANT CHANGE UP (ref 32–36)
MCV RBC AUTO: 93.6 FL — SIGNIFICANT CHANGE UP (ref 80–100)
MCV RBC AUTO: 95.2 FL — SIGNIFICANT CHANGE UP (ref 80–100)
PHOSPHATE SERPL-MCNC: 4 MG/DL — SIGNIFICANT CHANGE UP (ref 2.5–4.5)
PLATELET # BLD AUTO: 151 K/UL — SIGNIFICANT CHANGE UP (ref 150–400)
PLATELET # BLD AUTO: 158 K/UL — SIGNIFICANT CHANGE UP (ref 150–400)
POTASSIUM SERPL-MCNC: 4.3 MMOL/L — SIGNIFICANT CHANGE UP (ref 3.5–5.3)
POTASSIUM SERPL-SCNC: 4.3 MMOL/L — SIGNIFICANT CHANGE UP (ref 3.5–5.3)
RBC # BLD: 3.3 M/UL — LOW (ref 4.2–5.8)
RBC # BLD: 3.73 M/UL — LOW (ref 4.2–5.8)
RBC # FLD: 16.1 % — SIGNIFICANT CHANGE UP (ref 10.3–16.9)
RBC # FLD: 16.2 % — SIGNIFICANT CHANGE UP (ref 10.3–16.9)
SODIUM SERPL-SCNC: 143 MMOL/L — SIGNIFICANT CHANGE UP (ref 135–145)
WBC # BLD: 10 K/UL — SIGNIFICANT CHANGE UP (ref 3.8–10.5)
WBC # BLD: 9.6 K/UL — SIGNIFICANT CHANGE UP (ref 3.8–10.5)
WBC # FLD AUTO: 10 K/UL — SIGNIFICANT CHANGE UP (ref 3.8–10.5)
WBC # FLD AUTO: 9.6 K/UL — SIGNIFICANT CHANGE UP (ref 3.8–10.5)

## 2018-05-12 RX ORDER — MAGNESIUM SULFATE 500 MG/ML
1 VIAL (ML) INJECTION ONCE
Qty: 0 | Refills: 0 | Status: COMPLETED | OUTPATIENT
Start: 2018-05-12 | End: 2018-05-12

## 2018-05-12 RX ADMIN — Medication 100 GRAM(S): at 08:56

## 2018-05-12 NOTE — PROGRESS NOTE ADULT - ASSESSMENT
84M with hx of BRBPR and symptomatic anemia.    - Will admit to surgical service for observation.  - regualr diet   - Will continue home timolol eye drops, and home amlodipine, but will hold metoprolol  - Serial CBCs  - SCDs/OOBA 84M with hx of BRBPR and symptomatic anemia. s/p colonoscopy (diverticulum, old clots, no active bleeding), HD stable    - Advance to reg diet - low res  - D5 NS, hep lock once tolerated  - monitor any bloody BM  - Cont home meds   - AM labs   - repeat CBC if +sign of bleeding   - SCDs/OOBA

## 2018-05-12 NOTE — PROGRESS NOTE ADULT - SUBJECTIVE AND OBJECTIVE BOX
Pt seen and examined     REVIEW OF SYSTEMS:  Constitutional: No fever, weight loss or fatigue  Cardiovascular: No chest pain, palpitations, dizziness or leg swelling  Gastrointestinal: No abdominal or epigastric pain. No nausea, vomiting or hematemesis; No diarrhea or constipation. No melena or hematochezia.  Skin: No itching, burning, rashes or lesions       MEDICATIONS:  MEDICATIONS  (STANDING):  amLODIPine   Tablet 5 milliGRAM(s) Oral daily    MEDICATIONS  (PRN):  ondansetron Injectable 4 milliGRAM(s) IV Push every 6 hours PRN Nausea      Allergies    No Known Allergies    Intolerances        Vital Signs Last 24 Hrs  T(C): 37.6 (12 May 2018 20:26), Max: 37.6 (12 May 2018 15:10)  T(F): 99.7 (12 May 2018 20:26), Max: 99.7 (12 May 2018 20:26)  HR: 87 (12 May 2018 20:26) (73 - 87)  BP: 118/75 (12 May 2018 20:26) (102/67 - 126/67)  BP(mean): --  RR: 16 (12 May 2018 20:26) (14 - 18)  SpO2: 99% (12 May 2018 20:26) (97% - 99%)    05-12 @ 07:01  -  05-12 @ 23:10  --------------------------------------------------------  IN: 870 mL / OUT: 1150 mL / NET: -280 mL        PHYSICAL EXAM:    General: Well developed; well nourished; in no acute distress  HEENT: MMM, conjunctiva and sclera clear  Gastrointestinal: Soft non-tender non-distended; Normal bowel sounds; No hepatosplenomegaly  Skin: Warm and dry. No obvious rash    LABS:      CBC Full  -  ( 12 May 2018 19:07 )  WBC Count : 9.6 K/uL  Hemoglobin : 10.2 g/dL  Hematocrit : 31.4 %  Platelet Count - Automated : 151 K/uL  Mean Cell Volume : 95.2 fL  Mean Cell Hemoglobin : 30.9 pg  Mean Cell Hemoglobin Concentration : 32.5 g/dL  Auto Neutrophil # : x  Auto Lymphocyte # : x  Auto Monocyte # : x  Auto Eosinophil # : x  Auto Basophil # : x  Auto Neutrophil % : x  Auto Lymphocyte % : x  Auto Monocyte % : x  Auto Eosinophil % : x  Auto Basophil % : x    05-12    143  |  104  |  9   ----------------------------<  102<H>  4.3   |  26  |  0.82    Ca    9.0      12 May 2018 07:05  Phos  4.0     05-12  Mg     1.9     05-12                        RADIOLOGY & ADDITIONAL STUDIES (The following images were personally reviewed): Pt seen and examined at bedside  No new c/o  Had a small bout of hematochezia this am  On clears and tolerating  Denies lightheadedness, chest pain, palpitations, diaphoresis      MEDICATIONS:  MEDICATIONS  (STANDING):  amLODIPine   Tablet 5 milliGRAM(s) Oral daily    MEDICATIONS  (PRN):  ondansetron Injectable 4 milliGRAM(s) IV Push every 6 hours PRN Nausea      Allergies  No Known Allergies    Intolerances    Vital Signs Last 24 Hrs  T(C): 37.6 (12 May 2018 20:26), Max: 37.6 (12 May 2018 15:10)  T(F): 99.7 (12 May 2018 20:26), Max: 99.7 (12 May 2018 20:26)  HR: 87 (12 May 2018 20:26) (73 - 87)  BP: 118/75 (12 May 2018 20:26) (102/67 - 126/67)  BP(mean): --  RR: 16 (12 May 2018 20:26) (14 - 18)  SpO2: 99% (12 May 2018 20:26) (97% - 99%)    05-12 @ 07:01  -  05-12 @ 23:10  --------------------------------------------------------  IN: 870 mL / OUT: 1150 mL / NET: -280 mL      PHYSICAL EXAM:  General: Well developed; well nourished; in no acute distress  HEENT: MMM, conjunctiva and sclera clear  Gastrointestinal: Soft, BS+, NT, NR, NG, no masses or organs palpated  Skin: Warm and dry. No obvious rash      LABS:  CBC Full  -  ( 12 May 2018 19:07 )  WBC Count : 9.6 K/uL  Hemoglobin : 10.2 g/dL  Hematocrit : 31.4 %  Platelet Count - Automated : 151 K/uL  Mean Cell Volume : 95.2 fL  Mean Cell Hemoglobin : 30.9 pg  Mean Cell Hemoglobin Concentration : 32.5 g/dL    143  |  104  |  9   ----------------------------<  102<H>  4.3   |  26  |  0.82    Ca    9.0      12 May 2018 07:05  Phos  4.0     05-12  Mg     1.9     05-12          RADIOLOGY & ADDITIONAL STUDIES (The following images were personally reviewed):

## 2018-05-12 NOTE — PROGRESS NOTE ADULT - ASSESSMENT
84yr old M with PMHx significant for GERALD, HTN, left inguinal hernia (pending surgery with Dr. Dale in 6 months), cataracts s/p surgery, h/o internal hemorrhoids, admitted for evaluation of rectal bleeding this morning    # BRBPR -   - likely a diverticular bleed, but he also has hx of hemorrhoidal bleeding in the past  - s/p Colonoscopy showing multiple large mouthed diverticula in descending, transverse and ascending colon with clots, but no active bleeding after washing  - still with some BRBPR - could be residual blood   - Hgb stable post transfusion  - had gone down for a bleeding scan, vs CTA but this was aborted as patient was not actively bleeding and the yield was felt to be very low.  - please if patient bleeds plan for CTA with IR eval for possible embolization    Discussed with attending Dr Ley  GI following

## 2018-05-12 NOTE — PROGRESS NOTE ADULT - SUBJECTIVE AND OBJECTIVE BOX
O/N: post transfusion H/H 10.9/32.2  5/11: AM Hgb 7.8, transfused 2 units O/N: post transfusion H/H 10.9/32.2  5/11: AM Hgb 7.8, transfused 2 units     SUBJECTIVE: Patient seen and examined bedside by chief resident. No bloody BM last night, no nauea/vomiting, no abd pain    amLODIPine   Tablet 5 milliGRAM(s) Oral daily      Vital Signs Last 24 Hrs  T(C): 35.6 (12 May 2018 05:16), Max: 37.3 (11 May 2018 15:24)  T(F): 96.1 (12 May 2018 05:16), Max: 99.2 (11 May 2018 15:24)  HR: 76 (12 May 2018 05:16) (71 - 80)  BP: 124/69 (12 May 2018 05:16) (107/66 - 133/61)  BP(mean): --  RR: 18 (12 May 2018 05:16) (15 - 18)  SpO2: 97% (12 May 2018 05:16) (96% - 100%)  I&O's Detail      General: NAD, resting comfortably in bed  C/V: NSR  Pulm: Nonlabored breathing, no respiratory distress  Abd: soft, NT/ND.  Extrem: WWP, no edema, SCDs in place        LABS:                        11.7   10.0  )-----------( 158      ( 12 May 2018 07:05 )             34.9     05-12    143  |  104  |  9   ----------------------------<  102<H>  4.3   |  26  |  0.82    Ca    9.0      12 May 2018 07:05  Phos  4.0     05-12  Mg     1.9     05-12            RADIOLOGY & ADDITIONAL STUDIES:

## 2018-05-13 ENCOUNTER — TRANSCRIPTION ENCOUNTER (OUTPATIENT)
Age: 83
End: 2018-05-13

## 2018-05-13 VITALS
SYSTOLIC BLOOD PRESSURE: 108 MMHG | RESPIRATION RATE: 16 BRPM | HEART RATE: 98 BPM | TEMPERATURE: 99 F | OXYGEN SATURATION: 100 % | DIASTOLIC BLOOD PRESSURE: 74 MMHG

## 2018-05-13 LAB
ANION GAP SERPL CALC-SCNC: 10 MMOL/L — SIGNIFICANT CHANGE UP (ref 5–17)
BUN SERPL-MCNC: 10 MG/DL — SIGNIFICANT CHANGE UP (ref 7–23)
CALCIUM SERPL-MCNC: 8.6 MG/DL — SIGNIFICANT CHANGE UP (ref 8.4–10.5)
CHLORIDE SERPL-SCNC: 101 MMOL/L — SIGNIFICANT CHANGE UP (ref 96–108)
CO2 SERPL-SCNC: 29 MMOL/L — SIGNIFICANT CHANGE UP (ref 22–31)
CREAT SERPL-MCNC: 0.82 MG/DL — SIGNIFICANT CHANGE UP (ref 0.5–1.3)
GLUCOSE SERPL-MCNC: 109 MG/DL — HIGH (ref 70–99)
HCT VFR BLD CALC: 30.2 % — LOW (ref 39–50)
HGB BLD-MCNC: 10 G/DL — LOW (ref 13–17)
MAGNESIUM SERPL-MCNC: 2.2 MG/DL — SIGNIFICANT CHANGE UP (ref 1.6–2.6)
MCHC RBC-ENTMCNC: 30.7 PG — SIGNIFICANT CHANGE UP (ref 27–34)
MCHC RBC-ENTMCNC: 33.1 G/DL — SIGNIFICANT CHANGE UP (ref 32–36)
MCV RBC AUTO: 92.6 FL — SIGNIFICANT CHANGE UP (ref 80–100)
PHOSPHATE SERPL-MCNC: 3.6 MG/DL — SIGNIFICANT CHANGE UP (ref 2.5–4.5)
PLATELET # BLD AUTO: 172 K/UL — SIGNIFICANT CHANGE UP (ref 150–400)
POTASSIUM SERPL-MCNC: 4.4 MMOL/L — SIGNIFICANT CHANGE UP (ref 3.5–5.3)
POTASSIUM SERPL-SCNC: 4.4 MMOL/L — SIGNIFICANT CHANGE UP (ref 3.5–5.3)
RBC # BLD: 3.26 M/UL — LOW (ref 4.2–5.8)
RBC # FLD: 16 % — SIGNIFICANT CHANGE UP (ref 10.3–16.9)
SODIUM SERPL-SCNC: 140 MMOL/L — SIGNIFICANT CHANGE UP (ref 135–145)
WBC # BLD: 8.6 K/UL — SIGNIFICANT CHANGE UP (ref 3.8–10.5)
WBC # FLD AUTO: 8.6 K/UL — SIGNIFICANT CHANGE UP (ref 3.8–10.5)

## 2018-05-13 PROCEDURE — 84100 ASSAY OF PHOSPHORUS: CPT

## 2018-05-13 PROCEDURE — 93005 ELECTROCARDIOGRAM TRACING: CPT | Mod: 77

## 2018-05-13 PROCEDURE — 86900 BLOOD TYPING SEROLOGIC ABO: CPT

## 2018-05-13 PROCEDURE — 85610 PROTHROMBIN TIME: CPT

## 2018-05-13 PROCEDURE — 80053 COMPREHEN METABOLIC PANEL: CPT

## 2018-05-13 PROCEDURE — 86923 COMPATIBILITY TEST ELECTRIC: CPT

## 2018-05-13 PROCEDURE — 94660 CPAP INITIATION&MGMT: CPT

## 2018-05-13 PROCEDURE — 86850 RBC ANTIBODY SCREEN: CPT

## 2018-05-13 PROCEDURE — 85025 COMPLETE CBC W/AUTO DIFF WBC: CPT

## 2018-05-13 PROCEDURE — 83735 ASSAY OF MAGNESIUM: CPT

## 2018-05-13 PROCEDURE — 80048 BASIC METABOLIC PNL TOTAL CA: CPT

## 2018-05-13 PROCEDURE — 83880 ASSAY OF NATRIURETIC PEPTIDE: CPT

## 2018-05-13 PROCEDURE — 71045 X-RAY EXAM CHEST 1 VIEW: CPT

## 2018-05-13 PROCEDURE — 84484 ASSAY OF TROPONIN QUANT: CPT

## 2018-05-13 PROCEDURE — 36430 TRANSFUSION BLD/BLD COMPNT: CPT

## 2018-05-13 PROCEDURE — P9016: CPT

## 2018-05-13 PROCEDURE — 82550 ASSAY OF CK (CPK): CPT

## 2018-05-13 PROCEDURE — 86901 BLOOD TYPING SEROLOGIC RH(D): CPT

## 2018-05-13 PROCEDURE — 99285 EMERGENCY DEPT VISIT HI MDM: CPT | Mod: 25

## 2018-05-13 PROCEDURE — 82553 CREATINE MB FRACTION: CPT

## 2018-05-13 PROCEDURE — 85027 COMPLETE CBC AUTOMATED: CPT

## 2018-05-13 PROCEDURE — 36415 COLL VENOUS BLD VENIPUNCTURE: CPT

## 2018-05-13 PROCEDURE — 85730 THROMBOPLASTIN TIME PARTIAL: CPT

## 2018-05-13 RX ORDER — PANTOPRAZOLE SODIUM 20 MG/1
1 TABLET, DELAYED RELEASE ORAL
Qty: 30 | Refills: 0 | OUTPATIENT
Start: 2018-05-13 | End: 2018-06-11

## 2018-05-13 RX ADMIN — AMLODIPINE BESYLATE 5 MILLIGRAM(S): 2.5 TABLET ORAL at 05:21

## 2018-05-13 NOTE — DISCHARGE NOTE ADULT - CARE PLAN
Principal Discharge DX:	Rectal bleeding  Goal:	Healing  Assessment and plan of treatment:	Warning Signs:  Please call your doctor or nurse practitioner if you experience the following:  *You experience new chest pain, pressure, squeezing or tightness.  *New or worsening cough, shortness of breath, or wheeze.  *If you are vomiting and cannot keep down fluids or your medications.  *You are getting dehydrated due to continued vomiting, diarrhea, or other reasons. Signs of dehydration include dry mouth, rapid heartbeat, or feeling dizzy or faint when standing.  *You see blood or dark/black material when you vomit or have a bowel movement.  *You experience burning when you urinate, have blood in your urine, or experience a discharge.  *Your pain is not improving within 8-12 hours or is not gone within 24 hours. Call or return immediately if your pain is getting worse, changes location, or moves to your chest or back.  *You have shaking chills, or fever greater than 101.5 degrees Fahrenheit or 38 degrees Celsius.  *Any change in your symptoms, or any new symptoms that concern you.

## 2018-05-13 NOTE — PROGRESS NOTE ADULT - SUBJECTIVE AND OBJECTIVE BOX
ON : Hb @6pm : 10.2, VSS.    5/12 ; advanced to [facility], 1 bloody BM but less than yesterday, VSS, ordered for 6PM CBC. ON : Hb @6pm : 10.2, VSS.    5/12 ; advanced to [facility], 1 bloody BM but less than yesterday, VSS, ordered for 6PM CBC.    SUBJECTIVE:  pt seen sitting in chair, without complaints at this time. tolerating PO intake, no nausea and vomiting. denies further episodes of BPBPR    MEDICATIONS  (STANDING):  amLODIPine   Tablet 5 milliGRAM(s) Oral daily    MEDICATIONS  (PRN):  ondansetron Injectable 4 milliGRAM(s) IV Push every 6 hours PRN Nausea      Vital Signs Last 24 Hrs  T(C): 36.9 (13 May 2018 05:19), Max: 37.6 (12 May 2018 15:10)  T(F): 98.5 (13 May 2018 05:19), Max: 99.7 (12 May 2018 20:26)  HR: 76 (13 May 2018 05:19) (73 - 87)  BP: 114/75 (13 May 2018 05:19) (102/67 - 126/67)  BP(mean): --  RR: 15 (13 May 2018 05:19) (14 - 16)  SpO2: 98% (13 May 2018 05:19) (98% - 99%)    PHYSICAL EXAM:      Constitutional: A&Ox3    Respiratory: non labored breathing, no respiratory distress    Cardiovascular: NSR, RRR    Gastrointestinal: soft, nondistended, nontender    Extremities: (-) edema                  I&O's Detail    12 May 2018 07:01  -  13 May 2018 07:00  --------------------------------------------------------  IN:    Oral Fluid: 870 mL  Total IN: 870 mL    OUT:    Voided: 1150 mL  Total OUT: 1150 mL    Total NET: -280 mL          LABS:                        10.0   8.6   )-----------( 172      ( 13 May 2018 06:36 )             30.2     05-13    140  |  101  |  10  ----------------------------<  109<H>  4.4   |  29  |  0.82    Ca    8.6      13 May 2018 06:36  Phos  3.6     05-13  Mg     2.2     05-13            RADIOLOGY & ADDITIONAL STUDIES:

## 2018-05-13 NOTE — DISCHARGE NOTE ADULT - HOSPITAL COURSE
84M with a PMHx of GERALD (uses CPAP at home), HTN, cataracts, COPD, Diverticulosis, Left inguinal hernia presented to the [facility] ED with two episodes of hematochezia the morning of admission.  Pt reported two episodes of primarily maroon colored stool at 6AM and 10AM today.  Pt was then brought in by his daughter for further evaluation.  He noted dizziness upon standing, but denied any chest pain, sob, nausea, vomiting, dysuria, constipation.  Pt denied any CAD hx. He was admitted to the hospital and followed closely by both the surgical and gastroenterology teams. The following day, the patient was brought for colonoscopy by GI, which found no active bleeding. Patient had two more episodes of BRBPR, however stopped and remained hemodynamically stable. His hgb remained stable on day of discharge. His following hospital course was unremarkable with advancement of diet and pain control. On day of discharge patient was stable to be d/c'd home.

## 2018-05-13 NOTE — DISCHARGE NOTE ADULT - PLAN OF CARE
Healing Warning Signs:  Please call your doctor or nurse practitioner if you experience the following:  *You experience new chest pain, pressure, squeezing or tightness.  *New or worsening cough, shortness of breath, or wheeze.  *If you are vomiting and cannot keep down fluids or your medications.  *You are getting dehydrated due to continued vomiting, diarrhea, or other reasons. Signs of dehydration include dry mouth, rapid heartbeat, or feeling dizzy or faint when standing.  *You see blood or dark/black material when you vomit or have a bowel movement.  *You experience burning when you urinate, have blood in your urine, or experience a discharge.  *Your pain is not improving within 8-12 hours or is not gone within 24 hours. Call or return immediately if your pain is getting worse, changes location, or moves to your chest or back.  *You have shaking chills, or fever greater than 101.5 degrees Fahrenheit or 38 degrees Celsius.  *Any change in your symptoms, or any new symptoms that concern you.

## 2018-05-13 NOTE — PROGRESS NOTE ADULT - ASSESSMENT
84yr old M with PMHx significant for GERALD, HTN, left inguinal hernia (pending surgery with Dr. Dale in 6 months), cataracts s/p surgery, h/o internal hemorrhoids, admitted for evaluation of rectal bleeding this morning    # BRBPR -   - likely a diverticular bleed, but he also has hx of hemorrhoidal bleeding in the past  - s/p Colonoscopy showing multiple large mouthed diverticula in descending, transverse and ascending colon with clots, but no active bleeding after washing  - Hgb stable post transfusion  - had gone down for a bleeding scan, vs CTA but this was aborted as patient was not actively bleeding and the yield was felt to be very low.  - please if patient bleeds plan for CTA with IR eval for possible embolization  - Hgb appears to be stable      Discussed with attending Dr Ley  GI will sign off for now, please reconsult us as needed

## 2018-05-13 NOTE — DISCHARGE NOTE ADULT - MEDICATION SUMMARY - MEDICATIONS TO TAKE
I will START or STAY ON the medications listed below when I get home from the hospital:    metoprolol  -- Indication: For Home medication    amLODIPine  -- Indication: For Home medication    Protonix 40 mg oral delayed release tablet  -- 1 tab(s) by mouth once a day MDD:1  -- It is very important that you take or use this exactly as directed.  Do not skip doses or discontinue unless directed by your doctor.  Obtain medical advice before taking any non-prescription drugs as some may affect the action of this medication.  Swallow whole.  Do not crush.    -- Indication: For GERD

## 2018-05-13 NOTE — PROGRESS NOTE ADULT - SUBJECTIVE AND OBJECTIVE BOX
Pt seen and examined at bedside  No new c/o  denies n/v/d, bleeding pain      MEDICATIONS:  MEDICATIONS  (STANDING):  amLODIPine   Tablet 5 milliGRAM(s) Oral daily    MEDICATIONS  (PRN):  ondansetron Injectable 4 milliGRAM(s) IV Push every 6 hours PRN Nausea      Allergies  No Known Allergies    Intolerances      Vital Signs Last 24 Hrs  T(C): 37.3 (13 May 2018 08:49), Max: 37.6 (12 May 2018 15:10)  T(F): 99.1 (13 May 2018 08:49), Max: 99.7 (12 May 2018 20:26)  HR: 98 (13 May 2018 08:49) (73 - 98)  BP: 108/74 (13 May 2018 08:49) (102/67 - 118/75)  BP(mean): --  RR: 16 (13 May 2018 08:49) (15 - 16)  SpO2: 100% (13 May 2018 08:49) (98% - 100%)    05-12 @ 07:01  -  05-13 @ 07:00  --------------------------------------------------------  IN: 870 mL / OUT: 1150 mL / NET: -280 mL        PHYSICAL EXAM:  General: Well developed; well nourished; in no acute distress  HEENT: MMM, conjunctiva and sclera clear  Gastrointestinal: Soft, BS+, NT, NR, NG, no masses or organs palpated  Skin: Warm and dry. No obvious rash      LABS:  CBC Full  -  ( 13 May 2018 06:36 )  WBC Count : 8.6 K/uL  Hemoglobin : 10.0 g/dL  Hematocrit : 30.2 %  Platelet Count - Automated : 172 K/uL  Mean Cell Volume : 92.6 fL  Mean Cell Hemoglobin : 30.7 pg  Mean Cell Hemoglobin Concentration : 33.1 g/dL    140  |  101  |  10  ----------------------------<  109<H>  4.4   |  29  |  0.82    Ca    8.6      13 May 2018 06:36  Phos  3.6     05-13  Mg     2.2     05-13          RADIOLOGY & ADDITIONAL STUDIES (The following images were personally reviewed):

## 2018-05-13 NOTE — PROGRESS NOTE ADULT - ASSESSMENT
84M with hx of BRBPR and symptomatic anemia, resolving.    - low fibre diet  - AM labs   - monitor signs/symptoms of BRBPR  - cont home medications   - SCDs/OOBA  - pain control

## 2018-05-13 NOTE — DISCHARGE NOTE ADULT - PATIENT PORTAL LINK FT
You can access the [facility] Patient Portal, offered by [facility], by registering with the following website: [email or web address]

## 2018-05-16 DIAGNOSIS — K62.5 HEMORRHAGE OF ANUS AND RECTUM: ICD-10-CM

## 2018-05-16 DIAGNOSIS — K57.30 DIVERTICULOSIS OF LARGE INTESTINE WITHOUT PERFORATION OR ABSCESS WITHOUT BLEEDING: ICD-10-CM

## 2018-05-16 DIAGNOSIS — D62 ACUTE POSTHEMORRHAGIC ANEMIA: ICD-10-CM

## 2018-05-16 DIAGNOSIS — I10 ESSENTIAL (PRIMARY) HYPERTENSION: ICD-10-CM

## 2018-05-16 DIAGNOSIS — G47.33 OBSTRUCTIVE SLEEP APNEA (ADULT) (PEDIATRIC): ICD-10-CM

## 2018-05-16 DIAGNOSIS — K64.8 OTHER HEMORRHOIDS: ICD-10-CM

## 2018-05-16 DIAGNOSIS — K40.90 UNILATERAL INGUINAL HERNIA, WITHOUT OBSTRUCTION OR GANGRENE, NOT SPECIFIED AS RECURRENT: ICD-10-CM

## 2018-05-16 DIAGNOSIS — Z87.891 PERSONAL HISTORY OF NICOTINE DEPENDENCE: ICD-10-CM

## 2018-05-16 DIAGNOSIS — J44.9 CHRONIC OBSTRUCTIVE PULMONARY DISEASE, UNSPECIFIED: ICD-10-CM

## 2018-07-10 PROBLEM — G47.30 SLEEP APNEA, UNSPECIFIED: Chronic | Status: ACTIVE | Noted: 2018-05-09

## 2018-07-10 PROBLEM — I10 ESSENTIAL (PRIMARY) HYPERTENSION: Chronic | Status: ACTIVE | Noted: 2018-05-09

## 2018-07-10 PROBLEM — K46.9 UNSPECIFIED ABDOMINAL HERNIA WITHOUT OBSTRUCTION OR GANGRENE: Chronic | Status: ACTIVE | Noted: 2018-05-09

## 2018-07-10 PROBLEM — K64.9 UNSPECIFIED HEMORRHOIDS: Chronic | Status: ACTIVE | Noted: 2018-05-09

## 2018-07-26 ENCOUNTER — APPOINTMENT (OUTPATIENT)
Dept: PULMONOLOGY | Facility: CLINIC | Age: 83
End: 2018-07-26
Payer: MEDICARE

## 2018-07-26 VITALS
SYSTOLIC BLOOD PRESSURE: 134 MMHG | DIASTOLIC BLOOD PRESSURE: 80 MMHG | TEMPERATURE: 97.3 F | BODY MASS INDEX: 23.49 KG/M2 | HEIGHT: 68 IN | WEIGHT: 155 LBS | HEART RATE: 77 BPM | OXYGEN SATURATION: 97 %

## 2018-07-26 DIAGNOSIS — Z80.0 FAMILY HISTORY OF MALIGNANT NEOPLASM OF DIGESTIVE ORGANS: ICD-10-CM

## 2018-07-26 PROCEDURE — 99204 OFFICE O/P NEW MOD 45 MIN: CPT | Mod: 25,GC

## 2018-07-26 PROCEDURE — 94727 GAS DIL/WSHOT DETER LNG VOL: CPT

## 2018-07-26 PROCEDURE — 94060 EVALUATION OF WHEEZING: CPT

## 2018-07-26 PROCEDURE — 94729 DIFFUSING CAPACITY: CPT

## 2018-07-26 RX ORDER — AMLODIPINE BESYLATE 5 MG/1
5 TABLET ORAL
Refills: 0 | Status: ACTIVE | COMMUNITY

## 2018-07-26 RX ORDER — LATANOPROST/PF 0.005 %
0.01 DROPS OPHTHALMIC (EYE)
Refills: 0 | Status: ACTIVE | COMMUNITY

## 2018-09-04 ENCOUNTER — OUTPATIENT (OUTPATIENT)
Dept: OUTPATIENT SERVICES | Facility: HOSPITAL | Age: 83
LOS: 1 days | End: 2018-09-04
Payer: MEDICARE

## 2018-09-04 DIAGNOSIS — J44.9 CHRONIC OBSTRUCTIVE PULMONARY DISEASE, UNSPECIFIED: ICD-10-CM

## 2018-09-04 PROCEDURE — 94621 CARDIOPULM EXERCISE TESTING: CPT

## 2018-09-04 PROCEDURE — 94621 CARDIOPULM EXERCISE TESTING: CPT | Mod: 26

## 2018-10-28 ENCOUNTER — EMERGENCY (EMERGENCY)
Facility: HOSPITAL | Age: 83
LOS: 1 days | Discharge: HOME | End: 2018-10-28
Attending: EMERGENCY MEDICINE | Admitting: EMERGENCY MEDICINE
Payer: MEDICARE

## 2018-10-28 VITALS
WEIGHT: 154.98 LBS | OXYGEN SATURATION: 98 % | SYSTOLIC BLOOD PRESSURE: 165 MMHG | TEMPERATURE: 98 F | DIASTOLIC BLOOD PRESSURE: 96 MMHG | HEART RATE: 73 BPM | RESPIRATION RATE: 16 BRPM

## 2018-10-28 VITALS
OXYGEN SATURATION: 98 % | HEART RATE: 70 BPM | SYSTOLIC BLOOD PRESSURE: 150 MMHG | TEMPERATURE: 98 F | RESPIRATION RATE: 16 BRPM | DIASTOLIC BLOOD PRESSURE: 81 MMHG

## 2018-10-28 DIAGNOSIS — I10 ESSENTIAL (PRIMARY) HYPERTENSION: ICD-10-CM

## 2018-10-28 DIAGNOSIS — K62.5 HEMORRHAGE OF ANUS AND RECTUM: ICD-10-CM

## 2018-10-28 DIAGNOSIS — Z79.82 LONG TERM (CURRENT) USE OF ASPIRIN: ICD-10-CM

## 2018-10-28 DIAGNOSIS — K64.9 UNSPECIFIED HEMORRHOIDS: ICD-10-CM

## 2018-10-28 DIAGNOSIS — Z79.899 OTHER LONG TERM (CURRENT) DRUG THERAPY: ICD-10-CM

## 2018-10-28 LAB
ALBUMIN SERPL ELPH-MCNC: 4.5 G/DL — SIGNIFICANT CHANGE UP (ref 3.3–5)
ALP SERPL-CCNC: 89 U/L — SIGNIFICANT CHANGE UP (ref 40–120)
ALT FLD-CCNC: 18 U/L — SIGNIFICANT CHANGE UP (ref 10–45)
ANION GAP SERPL CALC-SCNC: 15 MMOL/L — SIGNIFICANT CHANGE UP (ref 5–17)
APTT BLD: 27 SEC — LOW (ref 27.5–37.4)
AST SERPL-CCNC: 27 U/L — SIGNIFICANT CHANGE UP (ref 10–40)
BASOPHILS NFR BLD AUTO: 0.7 % — SIGNIFICANT CHANGE UP (ref 0–2)
BILIRUB SERPL-MCNC: 0.3 MG/DL — SIGNIFICANT CHANGE UP (ref 0.2–1.2)
BLD GP AB SCN SERPL QL: NEGATIVE — SIGNIFICANT CHANGE UP
BUN SERPL-MCNC: 9 MG/DL — SIGNIFICANT CHANGE UP (ref 7–23)
CALCIUM SERPL-MCNC: 9.7 MG/DL — SIGNIFICANT CHANGE UP (ref 8.4–10.5)
CHLORIDE SERPL-SCNC: 96 MMOL/L — SIGNIFICANT CHANGE UP (ref 96–108)
CO2 SERPL-SCNC: 25 MMOL/L — SIGNIFICANT CHANGE UP (ref 22–31)
CREAT SERPL-MCNC: 0.74 MG/DL — SIGNIFICANT CHANGE UP (ref 0.5–1.3)
EOSINOPHIL NFR BLD AUTO: 1.5 % — SIGNIFICANT CHANGE UP (ref 0–6)
GLUCOSE SERPL-MCNC: 107 MG/DL — HIGH (ref 70–99)
HCT VFR BLD CALC: 41.4 % — SIGNIFICANT CHANGE UP (ref 39–50)
HGB BLD-MCNC: 13.4 G/DL — SIGNIFICANT CHANGE UP (ref 13–17)
INR BLD: 1.02 — SIGNIFICANT CHANGE UP (ref 0.88–1.16)
LYMPHOCYTES # BLD AUTO: 25.4 % — SIGNIFICANT CHANGE UP (ref 13–44)
MCHC RBC-ENTMCNC: 28.3 PG — SIGNIFICANT CHANGE UP (ref 27–34)
MCHC RBC-ENTMCNC: 32.4 G/DL — SIGNIFICANT CHANGE UP (ref 32–36)
MCV RBC AUTO: 87.5 FL — SIGNIFICANT CHANGE UP (ref 80–100)
MONOCYTES NFR BLD AUTO: 8.3 % — SIGNIFICANT CHANGE UP (ref 2–14)
NEUTROPHILS NFR BLD AUTO: 64.1 % — SIGNIFICANT CHANGE UP (ref 43–77)
PLATELET # BLD AUTO: 215 K/UL — SIGNIFICANT CHANGE UP (ref 150–400)
POTASSIUM SERPL-MCNC: 4.6 MMOL/L — SIGNIFICANT CHANGE UP (ref 3.5–5.3)
POTASSIUM SERPL-SCNC: 4.6 MMOL/L — SIGNIFICANT CHANGE UP (ref 3.5–5.3)
PROT SERPL-MCNC: 8 G/DL — SIGNIFICANT CHANGE UP (ref 6–8.3)
PROTHROM AB SERPL-ACNC: 11.3 SEC — SIGNIFICANT CHANGE UP (ref 9.8–12.7)
RBC # BLD: 4.73 M/UL — SIGNIFICANT CHANGE UP (ref 4.2–5.8)
RBC # FLD: 18.6 % — HIGH (ref 10.3–16.9)
RH IG SCN BLD-IMP: POSITIVE — SIGNIFICANT CHANGE UP
SODIUM SERPL-SCNC: 136 MMOL/L — SIGNIFICANT CHANGE UP (ref 135–145)
WBC # BLD: 6.9 K/UL — SIGNIFICANT CHANGE UP (ref 3.8–10.5)
WBC # FLD AUTO: 6.9 K/UL — SIGNIFICANT CHANGE UP (ref 3.8–10.5)

## 2018-10-28 PROCEDURE — 86901 BLOOD TYPING SEROLOGIC RH(D): CPT

## 2018-10-28 PROCEDURE — 80053 COMPREHEN METABOLIC PANEL: CPT

## 2018-10-28 PROCEDURE — 86850 RBC ANTIBODY SCREEN: CPT

## 2018-10-28 PROCEDURE — 36415 COLL VENOUS BLD VENIPUNCTURE: CPT

## 2018-10-28 PROCEDURE — 85610 PROTHROMBIN TIME: CPT

## 2018-10-28 PROCEDURE — 85025 COMPLETE CBC W/AUTO DIFF WBC: CPT

## 2018-10-28 PROCEDURE — 85730 THROMBOPLASTIN TIME PARTIAL: CPT

## 2018-10-28 PROCEDURE — 93010 ELECTROCARDIOGRAM REPORT: CPT

## 2018-10-28 PROCEDURE — 93005 ELECTROCARDIOGRAM TRACING: CPT

## 2018-10-28 PROCEDURE — 86900 BLOOD TYPING SEROLOGIC ABO: CPT

## 2018-10-28 PROCEDURE — 99284 EMERGENCY DEPT VISIT MOD MDM: CPT | Mod: 25

## 2018-10-28 NOTE — CONSULT NOTE ADULT - ASSESSMENT
84M w/ HTN, COPD, GERALD (on CPAP), & h/o diverticulosis (bleeding episode 5/2018 requiring 3u PRBC) & hemorrhoids who presented to [facility] ED 10/28/18 w/ bleeding, thrombosed external hemorrhoids x 2d.     -Thrombosed external hemorrhoid is only minimally painful w/ manipulation and was easily reduced at bedside. No intervention indicated at this time.   -Patient remains hemodynamically stable w/ H/H 13.4/41.4.   -Recommend discharge home w/ stool softeners and f/u w/ Dr. Hernandez in the office this week.   -Pt must return to ED if bleeding worsens or he develops lightheadedness/dizziness/SOB.   -Discussed w/ chief resident & attending

## 2018-10-28 NOTE — ED PROVIDER NOTE - OBJECTIVE STATEMENT
85 y/o m with PMH of hemorrhoids, HTN, COPD, sleep apnea presents to ED with BRBPR x 2 days.  Pt states it's a steady stream of bleeding with and without bowel movement.  Denies feeling dizzy, weak, chest pain, shortness of breath, syncope.  Takes baby aspirin but did not take today.  Pt has hx of similar symptoms in past (July 2018) at which time he was admitted and had colonscopy showing no active bleed.  Pt follows with Dr. Ortiz for hemorrhoids and states he feels an external hemorrhoid.

## 2018-10-28 NOTE — ED ADULT TRIAGE NOTE - CHIEF COMPLAINT QUOTE
Pt c/o rectal bleeding and pain for the past couple of days. Pt has hemorrhoids. Denies weakness, n/v/d.

## 2018-10-28 NOTE — CONSULT NOTE ADULT - SUBJECTIVE AND OBJECTIVE BOX
GENERAL SURGERY CONSULT NOTE     HPI: 84M w/ HTN, COPD, GERALD (on CPAP), & h/o diverticulosis (bleeding episode 5/2018 requiring 3u PRBC) & hemorrhoids who presented to [facility] ED 10/28/18 w/ painful perianal lump & blood per rectum x 2 days. First noted painful lump 2 days ago, followed by blood dripping into toilet. Denies blood mixed in with stool. Continues passing daily BMs (last this AM) w/ blood in toilet/on paper afterwards. Reports significant improvement in pain, which in now only elicited by manipulation. Denies any interval diverticular bleeding episodes since May. Denies any lightheadedness, dizziness, or shortness of breath. In the ED, pt remains afebrile & hemodynamically stable w/ H/H 13.4/41.4.     PMHx: COPD, GERALD (on CPAP), HTN, left inguinal hernia, diverticulosis, hemorrhoids    PSHx: No significant past surgical history    ROS: Pertinent positives/negatives noted in HPI.     HOME MEDS: amlodipine 5mg qD, metoprolol 25mg qD, Protonix 40mg qD    ALLERGIES: NKDA    SocHx: Former smoker.     FHx: No pertinent history in first degree relatives.     T(C): 36.9 (10-28-18 @ 08:52), Max: 36.9 (10-28-18 @ 08:52)  HR: 73 (10-28-18 @ 08:52) (73 - 73)  BP: 165/96 (10-28-18 @ 08:52) (165/96 - 165/96)  RR: 16 (10-28-18 @ 08:52) (16 - 16)  SpO2: 98% (10-28-18 @ 08:52) (98% - 98%)    PHYSICAL EXAM:  NEURO: A&Ox3, NAD, GOFF.  CV: RRR, S1&S2.   PULM: CTAB, no increased WOB.  ABD: Soft, non-distended, non-tender.   RECTAL: Bleeding, thrombosed external hemorrhoid in anterior midline--easily reduced at bedside. Bright red blood on gloved finger after second ALAN. No internal masses palpated. Good sphincter tone.   EXTR: WWP. No peripheral edema.    LABS:                        13.4   6.9   )-----------( 215      ( 28 Oct 2018 09:19 )             41.4     136  |  96  |  9   ----------------------------<  107<H>  4.6   |  25  |  0.74    Ca    9.7      28 Oct 2018 09:19    TPro  8.0  /  Alb  4.5  /  TBili  0.3  /  DBili  x   /  AST  27  /  ALT  18  /  AlkPhos  89  10-28    PT/INR - ( 28 Oct 2018 09:19 )   PT: 11.3 sec;   INR: 1.02     PTT - ( 28 Oct 2018 09:19 )  PTT:27.0 sec

## 2018-10-28 NOTE — ED PROVIDER NOTE - MEDICAL DECISION MAKING DETAILS
Pt with bleeding hemorrhoids - vital signs stable in ED, pt with some red bleeding from thrombosed hemorrhoid - hgb stable seen by surgery in ED and hemorrhoid reduced, as per surgery pt can be discharged home with outpt follow up with Diana this week.  Recommend stool softeners for home.

## 2018-10-28 NOTE — ED ADULT NURSE NOTE - PMH
COPD (chronic obstructive pulmonary disease)    Hemorrhoids    Hernia    Hypertension    Sleep apnea

## 2018-10-28 NOTE — ED PROVIDER NOTE - GASTROINTESTINAL, MLM
Abdomen soft, non-tender, no guarding, external bl Abdomen soft, non-tender, no guarding, external bleeding hemorrhoids, hard, not tender, (+) bleeding inside rectum

## 2018-10-28 NOTE — ED ADULT NURSE NOTE - OBJECTIVE STATEMENT
Pt w hx of HTN, COPD and glaucoma presents to Ed with c/o bulging hemorrhoids and BRBPR x three days. He denies any abdominal pain, SOB, weakness, dizziness, chest pain.

## 2018-11-02 ENCOUNTER — APPOINTMENT (OUTPATIENT)
Dept: SURGERY | Facility: CLINIC | Age: 83
End: 2018-11-02
Payer: MEDICARE

## 2018-11-02 VITALS
WEIGHT: 158 LBS | TEMPERATURE: 98 F | SYSTOLIC BLOOD PRESSURE: 125 MMHG | HEIGHT: 68 IN | BODY MASS INDEX: 23.95 KG/M2 | OXYGEN SATURATION: 96 % | DIASTOLIC BLOOD PRESSURE: 74 MMHG | HEART RATE: 74 BPM

## 2018-11-02 PROBLEM — J44.9 CHRONIC OBSTRUCTIVE PULMONARY DISEASE, UNSPECIFIED: Chronic | Status: ACTIVE | Noted: 2018-10-28

## 2018-11-02 PROCEDURE — 99213 OFFICE O/P EST LOW 20 MIN: CPT

## 2018-11-06 ENCOUNTER — APPOINTMENT (OUTPATIENT)
Dept: SURGERY | Facility: CLINIC | Age: 83
End: 2018-11-06
Payer: MEDICARE

## 2018-11-06 VITALS
SYSTOLIC BLOOD PRESSURE: 124 MMHG | HEART RATE: 71 BPM | OXYGEN SATURATION: 98 % | HEIGHT: 68 IN | TEMPERATURE: 97.9 F | DIASTOLIC BLOOD PRESSURE: 77 MMHG | BODY MASS INDEX: 23.79 KG/M2 | WEIGHT: 157 LBS

## 2018-11-06 PROCEDURE — 99213 OFFICE O/P EST LOW 20 MIN: CPT

## 2018-12-14 ENCOUNTER — APPOINTMENT (OUTPATIENT)
Dept: SURGERY | Facility: CLINIC | Age: 83
End: 2018-12-14
Payer: MEDICARE

## 2018-12-14 VITALS
OXYGEN SATURATION: 95 % | BODY MASS INDEX: 23.68 KG/M2 | DIASTOLIC BLOOD PRESSURE: 85 MMHG | WEIGHT: 156.25 LBS | HEART RATE: 76 BPM | SYSTOLIC BLOOD PRESSURE: 150 MMHG | HEIGHT: 68 IN | TEMPERATURE: 97.9 F

## 2018-12-14 DIAGNOSIS — K64.9 UNSPECIFIED HEMORRHOIDS: ICD-10-CM

## 2018-12-14 DIAGNOSIS — H40.9 UNSPECIFIED GLAUCOMA: ICD-10-CM

## 2018-12-14 DIAGNOSIS — I10 ESSENTIAL (PRIMARY) HYPERTENSION: ICD-10-CM

## 2018-12-14 PROCEDURE — 99213 OFFICE O/P EST LOW 20 MIN: CPT

## 2019-01-02 ENCOUNTER — APPOINTMENT (OUTPATIENT)
Dept: CARDIOTHORACIC SURGERY | Facility: CLINIC | Age: 84
End: 2019-01-02
Payer: MEDICARE

## 2019-01-02 VITALS
BODY MASS INDEX: 22.96 KG/M2 | HEIGHT: 69 IN | DIASTOLIC BLOOD PRESSURE: 77 MMHG | WEIGHT: 155 LBS | SYSTOLIC BLOOD PRESSURE: 166 MMHG | RESPIRATION RATE: 18 BRPM | TEMPERATURE: 97.5 F | OXYGEN SATURATION: 98 % | HEART RATE: 69 BPM

## 2019-01-02 PROCEDURE — 99205 OFFICE O/P NEW HI 60 MIN: CPT

## 2019-01-07 ENCOUNTER — OUTPATIENT (OUTPATIENT)
Dept: OUTPATIENT SERVICES | Facility: HOSPITAL | Age: 84
LOS: 1 days | End: 2019-01-07
Payer: MEDICARE

## 2019-01-07 DIAGNOSIS — R06.09 OTHER FORMS OF DYSPNEA: ICD-10-CM

## 2019-01-07 PROCEDURE — A9500: CPT

## 2019-01-07 PROCEDURE — 93017 CV STRESS TEST TRACING ONLY: CPT

## 2019-01-07 PROCEDURE — 78452 HT MUSCLE IMAGE SPECT MULT: CPT

## 2019-01-07 PROCEDURE — 93018 CV STRESS TEST I&R ONLY: CPT

## 2019-01-07 PROCEDURE — 78452 HT MUSCLE IMAGE SPECT MULT: CPT | Mod: 26

## 2019-01-07 PROCEDURE — 93016 CV STRESS TEST SUPVJ ONLY: CPT

## 2019-02-26 ENCOUNTER — APPOINTMENT (OUTPATIENT)
Dept: SURGERY | Facility: CLINIC | Age: 84
End: 2019-02-26
Payer: MEDICARE

## 2019-02-26 VITALS
HEIGHT: 69 IN | BODY MASS INDEX: 23.85 KG/M2 | OXYGEN SATURATION: 96 % | DIASTOLIC BLOOD PRESSURE: 74 MMHG | WEIGHT: 161 LBS | HEART RATE: 69 BPM | TEMPERATURE: 96 F | SYSTOLIC BLOOD PRESSURE: 131 MMHG

## 2019-02-26 DIAGNOSIS — K40.90 UNILATERAL INGUINAL HERNIA, W/OUT OBSTRUCTION OR GANGRENE, NOT SPECIFIED AS RECURRENT: ICD-10-CM

## 2019-02-26 PROCEDURE — 99213 OFFICE O/P EST LOW 20 MIN: CPT

## 2019-02-26 NOTE — HISTORY OF PRESENT ILLNESS
[de-identified] : Pt is a 83 y/o M with pmhx of copd and aortic aneurysm following cardiology, presents today feeling well. Pt saw cardiothoracic doctor in January, and pt was diagnosed with 4.2x4 aortic root dilitation, ascending aorta 3.3x3.2, descending aorta 2.8x2.9cm.  recommended not repairing the hernia at this time due to his underlying pulm and cardiac issues, unless absolutely necessary. Pt states left inguinal hernia does not bother him, causes no pain, no discomfort. Pt top f/u in 3 months

## 2019-03-13 ENCOUNTER — APPOINTMENT (OUTPATIENT)
Dept: PULMONOLOGY | Facility: CLINIC | Age: 84
End: 2019-03-13
Payer: MEDICARE

## 2019-03-13 VITALS
HEIGHT: 69 IN | HEART RATE: 80 BPM | OXYGEN SATURATION: 96 % | BODY MASS INDEX: 23.85 KG/M2 | SYSTOLIC BLOOD PRESSURE: 126 MMHG | DIASTOLIC BLOOD PRESSURE: 84 MMHG | WEIGHT: 161 LBS | TEMPERATURE: 96.5 F

## 2019-03-13 PROCEDURE — 99213 OFFICE O/P EST LOW 20 MIN: CPT | Mod: 25

## 2019-03-13 PROCEDURE — 94010 BREATHING CAPACITY TEST: CPT

## 2019-03-13 NOTE — CONSULT LETTER
[Dear  ___] : Dear  [unfilled], [Courtesy Letter:] : I had the pleasure of seeing your patient, [unfilled], in my office today. [Please see my note below.] : Please see my note below. [Consult Closing:] : Thank you very much for allowing me to participate in the care of this patient.  If you have any questions, please do not hesitate to contact me. [Sincerely,] : Sincerely, [FreeTextEntry2] : Reese Butt MD\par [address] \[place] [address] [FreeTextEntry3] : Maria Esther Rodgers MD

## 2019-03-13 NOTE — PHYSICAL EXAM
[General Appearance - Well Developed] : well developed [General Appearance - Well Nourished] : well nourished [General Appearance - In No Acute Distress] : no acute distress [Heart Rate And Rhythm] : heart rate and rhythm were normal [Heart Sounds] : normal S1 and S2 [Murmurs] : no murmurs present [Auscultation Breath Sounds / Voice Sounds] : lungs were clear to auscultation bilaterally

## 2019-03-13 NOTE — HISTORY OF PRESENT ILLNESS
[FreeTextEntry1] : 7/26/18 [Heather]: 84M with a PMHx of (?)COPD, GERALD on CPAP at home, presents for establishment of care for his COPD, and concerns about his slow decrease in exercise tolerance over the last 1-2 years. Patient currently is asymptomatic but notes that he has not been able to run as long as he used to. He is still able to climb up to 6 flights of stairs and when he gets dyspneic, he has no wheezing and will be back to normal after resting for less than a minute. \par The patient currently has a proair with him, but has never used it. Patient is a former smoker (20ppy, quit approximately 50 years ago) but has no other significant social history. 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 \par \par 3/13/19: Had a head cold, runny nose, developed a cough, dry, then severely productive and went on and on and on. So Dr Butt told him to come and check in with me, although his symptoms have now resolved without therapy as of about 2 weeks ago. Didn't feel sick. No change in dyspnea, or maybe a little more. Back to 90% now.

## 2019-03-13 NOTE — ASSESSMENT
[FreeTextEntry1] : Data reviewed:\par \par PA/lat CXR 1/2018 [facility] personally reviewed: clear lungs, mildly hyperinflated\par \par PFT 7/26/18: mod obstruction with no BD response (53%-->59%), normal volumes and DLCO\par Jh 3/13/19: obstructed, FEV1 57%\par \par CPET [facility] 9/4/18: normal exercise capacity, with reduced breathing reserve and desaturation to 90% at peak, no dynamic hyperinflation\par \par Impression:\par Moderate COPD\par \par Plan:\par His recent symptoms may have just been a terrible viral infection but could have been a COPD exacerbation. Regardless he's well now. Will give him a rescue inhaler; welcome to return for any recurrent symptoms, but has not had an episode like this before.

## 2019-07-09 ENCOUNTER — APPOINTMENT (OUTPATIENT)
Dept: SURGERY | Facility: CLINIC | Age: 84
End: 2019-07-09
Payer: MEDICARE

## 2019-07-09 VITALS
DIASTOLIC BLOOD PRESSURE: 80 MMHG | HEIGHT: 69 IN | TEMPERATURE: 96.5 F | SYSTOLIC BLOOD PRESSURE: 133 MMHG | WEIGHT: 165 LBS | BODY MASS INDEX: 24.44 KG/M2 | OXYGEN SATURATION: 99 % | HEART RATE: 64 BPM

## 2019-07-09 PROCEDURE — 99213 OFFICE O/P EST LOW 20 MIN: CPT

## 2019-07-09 NOTE — ADDENDUM
[FreeTextEntry1] : Documented by Jose Eldridge acting as a scribe for Dr. Jackson Hernandez on 07/09/2019\par

## 2019-07-09 NOTE — HISTORY OF PRESENT ILLNESS
[de-identified] : 86 y/o M with PMHx of COPD, aortic aneurysms and L inguinal hernia presents here for follow up. He reports here feeling well. He was informed in his prior visit not to undergo hernia repair surgery due to underlying cardiac and pulmonary issues. Patient currently still asymptomatic and is not experiencing any complications from his hernia.

## 2019-07-09 NOTE — ASSESSMENT
[FreeTextEntry1] : 86 y/o M with PMHx of COPD, aortic aneurysms and L inguinal hernia. On exam, patient does have an inguinal hernia on the left. However, due to patient's active history of cardiac and pulmonary issues, it is not recommended he undergo surgery for now due to higher risks of complications. Since patient is also asymptomatic, will continue to observe and monitor patient for now. Informed patient to call the office if any pain occurs. Will follow up here as needed.

## 2019-07-09 NOTE — PLAN
[FreeTextEntry1] : Continue to monitor L inguinal hernia\par Call office if any pain occurs.\par Follow up PRN.

## 2019-07-09 NOTE — END OF VISIT
[FreeTextEntry3] : All medical record entries made by the Scribe were at my, Dr. Hernandez's direction and personally dictated by me on 07/09/2019  I have reviewed the chart and agree that the record accurately reflects my personal performance of the history, physical exam, assessment and plan. I have also personally directed, reviewed, and agreed with the chart.\par

## 2019-09-04 NOTE — ED ADULT NURSE NOTE - HARM RISK FACTORS
PT IS ABLE TO COMMUNICATE HER NEEDS TO STAFF EFFECTIVELY. CURRENT PAIN
MEDICATION REGIMEN HAS BEEN ADEQUATE FOR CONTROLLING HER PAIN UP TO THIS TIME.
PT REPORTS NO FURTHER HEMATEMESIS SINCE TRANSFERING TO [facility]. no

## 2020-01-15 ENCOUNTER — APPOINTMENT (OUTPATIENT)
Dept: CARDIOTHORACIC SURGERY | Facility: CLINIC | Age: 85
End: 2020-01-15
Payer: MEDICARE

## 2020-01-15 VITALS
DIASTOLIC BLOOD PRESSURE: 71 MMHG | BODY MASS INDEX: 23.7 KG/M2 | SYSTOLIC BLOOD PRESSURE: 136 MMHG | HEART RATE: 81 BPM | RESPIRATION RATE: 18 BRPM | HEIGHT: 69 IN | TEMPERATURE: 97.5 F | OXYGEN SATURATION: 98 % | WEIGHT: 160 LBS

## 2020-01-15 DIAGNOSIS — I71.2 THORACIC AORTIC ANEURYSM, W/OUT RUPTURE: ICD-10-CM

## 2020-01-15 PROCEDURE — 99213 OFFICE O/P EST LOW 20 MIN: CPT

## 2020-01-16 PROBLEM — I71.2 THORACIC AORTIC ANEURYSM WITHOUT RUPTURE: Status: ACTIVE | Noted: 2020-01-16

## 2020-01-16 NOTE — PHYSICAL EXAM
[Sclera] : the sclera and conjunctiva were normal [PERRL With Normal Accommodation] : pupils were equal in size, round, and reactive to light [Neck Appearance] : the appearance of the neck was normal [Respiration, Rhythm And Depth] : normal respiratory rhythm and effort [] : no respiratory distress [Apical Impulse] : the apical impulse was normal [Examination Of The Chest] : the chest was normal in appearance [No Abnormalities] : the abdominal aorta was not enlarged and no bruit was heard [2+] : left 2+ [Cervical Lymph Nodes Enlarged Posterior Bilaterally] : posterior cervical [Abdomen Soft] : soft [No CVA Tenderness] : no ~M costovertebral angle tenderness [Cervical Lymph Nodes Enlarged Anterior Bilaterally] : anterior cervical [Skin Color & Pigmentation] : normal skin color and pigmentation [Abnormal Walk] : normal gait [Skin Turgor] : normal skin turgor [Cranial Nerves] : cranial nerves 2-12 were intact [General Appearance - Alert] : alert [Deep Tendon Reflexes (DTR)] : deep tendon reflexes were 2+ and symmetric [Oriented To Time, Place, And Person] : oriented to person, place, and time [General Appearance - In No Acute Distress] : in no acute distress [Outer Ear] : the ears and nose were normal in appearance [Both Tympanic Membranes Were Examined] : both tympanic membranes were normal [FreeTextEntry1] : deferred

## 2020-01-16 NOTE — DATA REVIEWED
[FreeTextEntry1] : CTA chest 4/3/18 revealed aortic root 4.2 x 4cm, ascending aorta 3.3 x 3.2cm, descending aorta 2.8 x 2.9cm. \par \par Echocardiogram 9/7/18 revealed EF > 55%, mild AR, aortic root 3.9cm.

## 2020-01-16 NOTE — END OF VISIT
[FreeTextEntry3] : Scribe Attestation:\par I personally scribed for POONAM BHATIA on Fei 15 2020  9:30AM . \par \par \par \par \par Physician Attestation:\par Documented by LUIS WARNER acting as a scribe for POONAM BHATIA 01/16/2020 . \par                 All medical record entries made by the Scribe were at my, POONAM BHATIA , direction and personally dictated by me on 01/15/2020 . I have reviewed the chart and agree that the record accurately reflects my personal performance of the history, physical exam, assessment and plan. \par \par

## 2020-01-16 NOTE — ASSESSMENT
[FreeTextEntry1] : 84 year old male with COPD and HTN presents for a follow up visit for evaluation and management of an aortic aneurysm. \par \par The patient's medical records and diagnostic images were reviewed at the time of this office visit, and the following recommendation was made. Review of the imaging shows aortic pathology has remained stable and does not require surgical intervention at this time.\par \par Plan\par \par 1. Follow up in [facility] 2 years with CTA chest.\par 2. Continue medication regimen.\par 3. Follow up with cardiologist and PCP.\par

## 2020-01-16 NOTE — CONSULT LETTER
[Dear  ___] : Dear  [unfilled], [DrDelano  ___] : Dr. ORTEGA [FreeTextEntry2] : Reese Butt MD\par [address] \[place] [address] [FreeTextEntry1] : \par  I had the pleasure of seeing your patient, CHRISTIE MACIAS, in my office today. We take a multidisciplinary team approach to patient care and consider you, the referring physician, an extension of our team. We will maintain an open line of communication with you throughout your patient's treatment course.  \par \par Mr. MACIAS presents for one year follow up visit for evaluation and management of thoracic aortic aneurysm. I have reviewed all of his medical records and diagnostic images at the time of his office consultation. I have enclosed a copy for your records. \par \par I have reviewed the indications for surgery,and used our webpage [email or web address] <[email or web address]> to illustrate the aorta and anatomy of the heart. The patient does not meet size criteria for surgical intervention at the time. Review of the imaging shows his  aortic pathology has remained stable. Therefore, I have recommended that the patient will require a follow up appointment in two years with CTA chest to monitor aortic pathology. My office will assist the patient with his upcoming appointment and I will update you on his progress at that time.\par \par I have discussed with the patient that we will continue to monitor his aortic pathology closely at the [facility] at [facility] that encompasses the entire health care system and is one of the largest in the nation at this point.\par \par It is our commitment to provide your patient with the highest quality of advanced therapeutic options. On behalf of the Cardiothoracic Surgery Team, thank you for sending your patient to the [facility] based at [facility] [place] [facility].  If there are any questions or concerns, please call me directly at [phone].  \par \par Please see my note below. \par \par Sincerely, \par \par \par \par \par \par Amrik Fowler M.D.\par Professor of Cardiovascular and Thoracic Surgery\par Minimally Invasive Valve Surgeon\par Director of Aortic Surgery, [facility]\par Cell: [phone]\par Email: [email or web address] \par \par [facility] [place] [facility]:\par [address], [address] Floor, [place] [address]\par Office: [phone]\par Fax: [phone]\par \par [facility]:\par Department of Cardiovascular and Thoracic Surgery\par [address] [place] [address], [address]\par Office: [phone]\par Fax: [phone]\par \par Practice Manager: Ms. Sayda Sampson\par Email: [email or web address]\par Phone: [phone]\par

## 2020-01-16 NOTE — PROCEDURE
[FreeTextEntry1] : \par Dr. Fowler discussed activity restrictions with the patient, and would advise exercise at a moderate amount with no heavy lifting over one third of body weight, and avoiding heart rates that exceed 140 beats per minute. Every patient must abstain from tobacco and illicit drug use, especially stimulants such as cocaine or methamphetamine. The patient was also counseled on maintaining a healthy heart diet, and losing any excessive weight as this also put undue stress on both the aorta and entire cardiovascular system. Patient was counseled on the importance of medication adherence for blood pressure control, as hypertension is a significant risk factor associated with aortic dissections. First degree family members should be screened for bicuspid valve disease, and ascending aortic aneurysms.\par \par Dr. Fowler reviewed the indications for surgery, and used our webpage [email or web address] to illustrate the aorta and anatomy of the heart. Those indications are the following: size greater than 5.0 cm, symptomatic aneurysms, family history of aortic dissection or aneurysm death with a size greater than 4.5 cm, other necessary cardiac procedures such as coronary artery bypass grafting or severe valvular disorders with an aneurysm greater than 4.5 cm, or connective tissue disorders with an aneurysm size greater than 4.5 cm. The patient does not meet size criteria for surgical intervention at the time.

## 2020-01-27 ENCOUNTER — APPOINTMENT (OUTPATIENT)
Dept: PULMONOLOGY | Facility: CLINIC | Age: 85
End: 2020-01-27
Payer: MEDICARE

## 2020-01-27 VITALS
SYSTOLIC BLOOD PRESSURE: 130 MMHG | BODY MASS INDEX: 23.7 KG/M2 | OXYGEN SATURATION: 99 % | TEMPERATURE: 95.9 F | HEART RATE: 77 BPM | WEIGHT: 160 LBS | HEIGHT: 69 IN | DIASTOLIC BLOOD PRESSURE: 70 MMHG

## 2020-01-27 PROCEDURE — 99213 OFFICE O/P EST LOW 20 MIN: CPT | Mod: 25

## 2020-01-27 PROCEDURE — 94010 BREATHING CAPACITY TEST: CPT

## 2020-01-27 NOTE — HISTORY OF PRESENT ILLNESS
[TextBox_4] : 7/26/18 [Heather]: 84M with a PMHx of (?)COPD, GERALD on CPAP at home, presents for establishment of care for his COPD, and concerns about his slow decrease in exercise tolerance over the last 1-2 years. Patient currently is asymptomatic but notes that he has not been able to run as long as he used to. He is still able to climb up to 6 flights of stairs and when he gets dyspneic, he has no wheezing and will be back to normal after resting for less than a minute. \par The patient currently has a proair with him, but has never used it. Patient is a former smoker (20ppy, quit approximately 50 years ago) but has no other significant social history. 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 \par \par 3/13/19: Had a head cold, runny nose, developed a cough, dry, then severely productive and went on and on and on. So Dr Butt told him to come and check in with me, although his symptoms have now resolved without therapy as of about 2 weeks ago. Didn't feel sick. No change in dyspnea, or maybe a little more. Back to 90% now. \par \par 1/27/20: He recently had a typical cold w runny nose and sore throat, and then developed a productive cough for a couple of weeks without dyspnea or fatigue. Has recovered now. Pursuing all usual activities. Cares for 4 year old grandson. Had flu shot. No inhalers.

## 2020-01-27 NOTE — PHYSICAL EXAM
[No Acute Distress] : no acute distress [Normal Oropharynx] : normal oropharynx [Normal Rate/Rhythm] : normal rate/rhythm [Normal S1, S2] : normal s1, s2 [No Murmurs] : no murmurs [No Resp Distress] : no resp distress [Clear to Auscultation Bilaterally] : clear to auscultation bilaterally

## 2020-01-27 NOTE — CONSULT LETTER
[Dear  ___] : Dear  [unfilled], [Courtesy Letter:] : I had the pleasure of seeing your patient, [unfilled], in my office today. [Please see my note below.] : Please see my note below. [Consult Closing:] : Thank you very much for allowing me to participate in the care of this patient.  If you have any questions, please do not hesitate to contact me. [Sincerely,] : Sincerely, [FreeTextEntry2] : Reese Butt MD\par [address] \[place] [address] [FreeTextEntry3] : Maria Esther Rodgers MD

## 2020-01-27 NOTE — ASSESSMENT
[FreeTextEntry1] : Data reviewed:\par \par PA/lat CXR 1/2018 [facility]: clear lungs, mildly hyperinflated\par CTA [facility] 1/2020 personally reviewed: unremarkable lungs\par \par PFT 7/26/18: mod obstruction with no BD response (53%-->59%), normal volumes and DLCO\par [facility] 3/13/19: obstructed, FEV1 57%\par Jh 1/27/20: obstruction, FEV1 46%\par \par CPET [facility] 9/4/18: normal exercise capacity, with reduced breathing reserve and desaturation to 90% at peak, no dynamic hyperinflation\par \par Impression:\par Moderate COPD\par \par Plan:\par Again, may have just had a viral bronchitis or may have had a COPD exacerbation. Is well and asymptomatic now. Will continue to monitor. Counseled to return for any new dyspnea, and otherwise remain physically active.\par Had flu shot.

## 2020-02-11 ENCOUNTER — TRANSCRIPTION ENCOUNTER (OUTPATIENT)
Age: 85
End: 2020-02-11

## 2020-09-03 NOTE — DISCHARGE NOTE ADULT - BECAUSE OF A PHYSICAL, MENTAL OR EMOTIONAL CONDITION, DO YOU HAVE DIFFICULTY DOING  ERRANDS ALONE LIKE VISITING A DOCTOR'S OFFICE OR SHOPPING (15 YEARS AND OLDER)
Instructions for after your eye surgery:  Please leave the patch on overnight. It will be removed in clinic tomorrow    Acetaminophen (Tylenol) and NSAIDs (Motrin, Ibuprofen, Advil, Naproxen) may be given per the dosing instructions on the label for pain every 6 hours.  I recommend alternating these two types of medicine every 3 hours so that Ameena receives one of them for pain control every 3 hours.  (For example: acetaminophen - wait 3 hours - ibuprofen - wait 3 hours - acetaminophen - wait 3 hours - ibuprofen - etc.)    Avoid all eye pressure or trauma. No eye rubbing, straining, or athletics for 1 week.     If Ameena Solorzano experiences worsening RSVP (Redness, Sensitivity to light, Vision, Pain), or if Ameena develops a fever (temperature greater than 100.4 F) or worsening discharge or if you have any other concerns:      call Dr. Fam's cell phone: [phone]  OR    call [phone] (during business hours) or [phone] (after hours & weekends) and ask to speak with the Ophthalmology Resident or Fellow On-Call   OR    return to the eye clinic or emergency room immediately.     If Ameena is unable to tolerate food and drink, vomits 3 times, or appears to have decreased alertness or lethargy, return to the emergency room immediately as these can be signs of delayed stomach wake-up after anesthesia and Ameena may need IV fluids to prevent dehydration.     Same-Day Surgery   Adult Discharge Orders & Instructions     For 24 hours after surgery:  1. Get plenty of rest.  A responsible adult must stay with you for at least 24 hours after you leave the hospital.   2. Pain medication can slow your reflexes. Do not drive or use heavy equipment.  If you have weakness or tingling, don't drive or use heavy equipment until this feeling goes away.  3. Mixing alcohol and pain medication can cause dizziness and slow your breathing. It can even be fatal. Do not drink alcohol while taking pain  medication.  4. Avoid strenuous or risky activities.  Ask for help when climbing stairs.   5. You may feel lightheaded.  If so, sit for a few minutes before standing.  Have someone help you get up.   6. If you have nausea (feel sick to your stomach), drink only clear liquids such as apple juice, ginger ale, broth or 7-Up.  Rest may also help.  Be sure to drink enough fluids.  Move to a regular diet as you feel able. Take pain medications with a small amount of solid food, such as toast or crackers, to avoid nausea.   7. A slight fever is normal. Call the doctor if your fever is over 100 F (37.7 C) (taken under the tongue) or lasts longer than 24 hours.  8. You may have a dry mouth, muscle aches, trouble sleeping or a sore throat.  These symptoms should go away after 24 hours.  9. Do not make important or legal decisions.   Pain Management:      1. Take pain medication (if prescribed) for pain as directed by your physician.        2. WARNING: If the pain medication you have been prescribed contains Tylenol  (acetaminophen), DO NOT take additional doses of Tylenol (acetaminophen).     Call your doctor for any of the followin.  Signs of infection (fever, growing tenderness at the surgery site, severe pain, a large amount of drainage or bleeding, foul-smelling drainage, redness, swelling).    2.  It has been over 8 to 10 hours since surgery and you are still not able to urinate (pee).    3.  Headache for over 24 hours.    4.  Numbness, tingling or weakness the day after surgery (if you had spinal anesthesia).  To contact a doctor, call _____________________________________ or:      [phone] and ask for the Resident On Call for:          _______opthamology _____________________ (answered 24 hours a day)      Emergency Department:  [place]: [phone]  [facility]: [phone]               Rev. 10/2014      No

## 2021-01-13 NOTE — ED PROVIDER NOTE - TOBACCO USE
Pt called wanting to know if you thought it would be good for her to have a CT prior to surgery? She says due to all her medical issues she wasn't sure if you would need this prior to surgery. Please advise  
Spoke with pt. She is aware and will go to [facility] for Ct  
Never smoker

## 2021-01-24 VITALS
OXYGEN SATURATION: 98 % | SYSTOLIC BLOOD PRESSURE: 99 MMHG | HEART RATE: 84 BPM | WEIGHT: 160.06 LBS | DIASTOLIC BLOOD PRESSURE: 67 MMHG | HEIGHT: 69 IN | RESPIRATION RATE: 16 BRPM

## 2021-01-24 LAB
ALBUMIN SERPL ELPH-MCNC: 3.8 G/DL — SIGNIFICANT CHANGE UP (ref 3.3–5)
ALP SERPL-CCNC: 60 U/L — SIGNIFICANT CHANGE UP (ref 40–120)
ALT FLD-CCNC: 22 U/L — SIGNIFICANT CHANGE UP (ref 10–45)
ANION GAP SERPL CALC-SCNC: 9 MMOL/L — SIGNIFICANT CHANGE UP (ref 5–17)
APTT BLD: 24.8 SEC — LOW (ref 27.5–35.5)
AST SERPL-CCNC: 25 U/L — SIGNIFICANT CHANGE UP (ref 10–40)
BASE EXCESS BLDV CALC-SCNC: -0.2 MMOL/L — SIGNIFICANT CHANGE UP
BASOPHILS # BLD AUTO: 0.06 K/UL — SIGNIFICANT CHANGE UP (ref 0–0.2)
BASOPHILS NFR BLD AUTO: 0.5 % — SIGNIFICANT CHANGE UP (ref 0–2)
BILIRUB SERPL-MCNC: 0.4 MG/DL — SIGNIFICANT CHANGE UP (ref 0.2–1.2)
BUN SERPL-MCNC: 25 MG/DL — HIGH (ref 7–23)
CA-I SERPL-SCNC: 1.17 MMOL/L — SIGNIFICANT CHANGE UP (ref 1.12–1.3)
CALCIUM SERPL-MCNC: 9.2 MG/DL — SIGNIFICANT CHANGE UP (ref 8.4–10.5)
CHLORIDE SERPL-SCNC: 103 MMOL/L — SIGNIFICANT CHANGE UP (ref 96–108)
CO2 SERPL-SCNC: 27 MMOL/L — SIGNIFICANT CHANGE UP (ref 22–31)
CREAT SERPL-MCNC: 1.09 MG/DL — SIGNIFICANT CHANGE UP (ref 0.5–1.3)
EOSINOPHIL # BLD AUTO: 0.04 K/UL — SIGNIFICANT CHANGE UP (ref 0–0.5)
EOSINOPHIL NFR BLD AUTO: 0.3 % — SIGNIFICANT CHANGE UP (ref 0–6)
GAS PNL BLDV: 138 MMOL/L — SIGNIFICANT CHANGE UP (ref 138–146)
GAS PNL BLDV: SIGNIFICANT CHANGE UP
GAS PNL BLDV: SIGNIFICANT CHANGE UP
GLUCOSE SERPL-MCNC: 132 MG/DL — HIGH (ref 70–99)
HCO3 BLDV-SCNC: 28 MMOL/L — HIGH (ref 20–27)
HCT VFR BLD CALC: 36 % — LOW (ref 39–50)
HGB BLD-MCNC: 11.3 G/DL — LOW (ref 13–17)
IMM GRANULOCYTES NFR BLD AUTO: 1.1 % — SIGNIFICANT CHANGE UP (ref 0–1.5)
INR BLD: 1.07 — SIGNIFICANT CHANGE UP (ref 0.88–1.16)
LYMPHOCYTES # BLD AUTO: 16.4 % — SIGNIFICANT CHANGE UP (ref 13–44)
LYMPHOCYTES # BLD AUTO: 2.15 K/UL — SIGNIFICANT CHANGE UP (ref 1–3.3)
MCHC RBC-ENTMCNC: 31.2 PG — SIGNIFICANT CHANGE UP (ref 27–34)
MCHC RBC-ENTMCNC: 31.4 GM/DL — LOW (ref 32–36)
MCV RBC AUTO: 99.4 FL — SIGNIFICANT CHANGE UP (ref 80–100)
MONOCYTES # BLD AUTO: 0.73 K/UL — SIGNIFICANT CHANGE UP (ref 0–0.9)
MONOCYTES NFR BLD AUTO: 5.6 % — SIGNIFICANT CHANGE UP (ref 2–14)
NEUTROPHILS # BLD AUTO: 10 K/UL — HIGH (ref 1.8–7.4)
NEUTROPHILS NFR BLD AUTO: 76.1 % — SIGNIFICANT CHANGE UP (ref 43–77)
NRBC # BLD: 0 /100 WBCS — SIGNIFICANT CHANGE UP (ref 0–0)
PCO2 BLDV: 67 MMHG — HIGH (ref 41–51)
PH BLDV: 7.24 — LOW (ref 7.32–7.43)
PLATELET # BLD AUTO: 146 K/UL — LOW (ref 150–400)
PO2 BLDV: 20 MMHG — SIGNIFICANT CHANGE UP
POTASSIUM BLDV-SCNC: 4.9 MMOL/L — SIGNIFICANT CHANGE UP (ref 3.5–4.9)
POTASSIUM SERPL-MCNC: 6.1 MMOL/L — HIGH (ref 3.5–5.3)
POTASSIUM SERPL-SCNC: 6.1 MMOL/L — HIGH (ref 3.5–5.3)
PROT SERPL-MCNC: 6.5 G/DL — SIGNIFICANT CHANGE UP (ref 6–8.3)
PROTHROM AB SERPL-ACNC: 12.8 SEC — SIGNIFICANT CHANGE UP (ref 10.6–13.6)
RBC # BLD: 3.62 M/UL — LOW (ref 4.2–5.8)
RBC # FLD: 13.8 % — SIGNIFICANT CHANGE UP (ref 10.3–14.5)
SAO2 % BLDV: 21 % — SIGNIFICANT CHANGE UP
SODIUM SERPL-SCNC: 139 MMOL/L — SIGNIFICANT CHANGE UP (ref 135–145)
WBC # BLD: 13.12 K/UL — HIGH (ref 3.8–10.5)
WBC # FLD AUTO: 13.12 K/UL — HIGH (ref 3.8–10.5)

## 2021-01-24 RX ORDER — PANTOPRAZOLE SODIUM 20 MG/1
40 TABLET, DELAYED RELEASE ORAL ONCE
Refills: 0 | Status: COMPLETED | OUTPATIENT
Start: 2021-01-24 | End: 2021-01-24

## 2021-01-24 RX ORDER — SODIUM CHLORIDE 9 MG/ML
1000 INJECTION INTRAMUSCULAR; INTRAVENOUS; SUBCUTANEOUS ONCE
Refills: 0 | Status: COMPLETED | OUTPATIENT
Start: 2021-01-24 | End: 2021-01-24

## 2021-01-24 RX ADMIN — PANTOPRAZOLE SODIUM 40 MILLIGRAM(S): 20 TABLET, DELAYED RELEASE ORAL at 23:18

## 2021-01-24 RX ADMIN — SODIUM CHLORIDE 1000 MILLILITER(S): 9 INJECTION INTRAMUSCULAR; INTRAVENOUS; SUBCUTANEOUS at 23:17

## 2021-01-24 NOTE — ED ADULT NURSE NOTE - OBJECTIVE STATEMENT
pt c/o large amount of blood when having a bowel movement today. Pt reports having a prior GI bleed 2 years ago.

## 2021-01-24 NOTE — ED PROVIDER NOTE - CLINICAL SUMMARY MEDICAL DECISION MAKING FREE TEXT BOX
avss. nontoxic. NAD. no abd pain. found to have dark red secretions and empty rectal vault on rectal exam and hb 11.3, repeat pending. s/p protonix 40 ivp and 1L ivf. GI consulted. reccs for admission for colonoscopy. surgery consulted. reccs for admission. no indication for cta a/p or prbc transfusion at this time. no recurrent sx while in ED.

## 2021-01-24 NOTE — ED PROVIDER NOTE - PHYSICAL EXAMINATION
CONST: nontoxic NAD speaking in full sentences  HEAD: atraumatic  EYES: conjunctivae clear, PERRL, EOMI  ENT: mmm  NECK: supple/FROM, nttp, no jvd  CARD: rrr no murmurs  CHEST: ctab no r/r/w, no stridor/retractions/tripoding  ABD: soft, nd, nttp, no rebound/guarding  RECTAL: empty rectal vault but dark red secretions  EXT: FROM, symmetric distal pulses intact  SKIN: warm, dry, no rash, cap refill <2sec  NEURO: a+ox3, 5/5 strength x4, gross sensation intact x4, normal gait

## 2021-01-24 NOTE — ED PROVIDER NOTE - OBJECTIVE STATEMENT
86M mild copd/emphysema (no home o2/hospitalizations), ting on cpap, htn, LGIB 2/2 diverticulosis on colonoscopy s/p prbc transfusion (5/2018), on asa 81 ppx (last dose this AM), now c/o 1 large episode hematochezia ~2h pta. no fever/chills, no ha/dizziness, no uri/cough, no cp/sob, no abd pain/n/v, no diarrhea, no melena, no change in appetite/po intake, no dysuria, no etoh-dpt/ivdu, no excessive nsaids.

## 2021-01-24 NOTE — ED PROVIDER NOTE - PROGRESS NOTE DETAILS
surgery consulted. will see pt. GI consulted. agrees w/ protonix 40 and admission to surgery. no indication for cta at this time. no indication for asa reversal at this time. plan for npo @ [facility] and colonoscopy in AM. pt remains stable w/o recurrent bm while in ED. surgery reccs for admission to surgery/tele under dr burrows. lab system down. results: hb 10.4, vbg K 5.1, bmp K 5.3

## 2021-01-24 NOTE — ED ADULT NURSE NOTE - OTHER COMPLAINTS
pt reports large amt of blood while having bowel movement today, reports hx of lower GI bleed 2 years ago that required transfusion, this is the first episode since, pt also reports generalized weakness but no CP or SOB, also hx of GERALD and glaucoma, pt A&Ox3, ambulatory in triage with steady gait, unable to obtain oral or axillary temp

## 2021-01-25 ENCOUNTER — INPATIENT (INPATIENT)
Facility: HOSPITAL | Age: 86
LOS: 7 days | Discharge: HOME | DRG: 378 | End: 2021-02-02
Attending: SURGERY | Admitting: SURGERY
Payer: MEDICARE

## 2021-01-25 DIAGNOSIS — Z98.890 OTHER SPECIFIED POSTPROCEDURAL STATES: Chronic | ICD-10-CM

## 2021-01-25 LAB
ANION GAP SERPL CALC-SCNC: 10 MMOL/L — SIGNIFICANT CHANGE UP (ref 5–17)
ANION GAP SERPL CALC-SCNC: 11 MMOL/L — SIGNIFICANT CHANGE UP (ref 5–17)
APTT BLD: 25.7 SEC — LOW (ref 27.5–35.5)
BASOPHILS # BLD AUTO: 0 K/UL — SIGNIFICANT CHANGE UP (ref 0–0.2)
BASOPHILS NFR BLD AUTO: 0 % — SIGNIFICANT CHANGE UP (ref 0–2)
BLD GP AB SCN SERPL QL: NEGATIVE — SIGNIFICANT CHANGE UP
BLD GP AB SCN SERPL QL: NEGATIVE — SIGNIFICANT CHANGE UP
BUN SERPL-MCNC: 17 MG/DL — SIGNIFICANT CHANGE UP (ref 7–23)
BUN SERPL-MCNC: 24 MG/DL — HIGH (ref 7–23)
CA-I SERPL-SCNC: 1.07 MMOL/L — LOW (ref 1.12–1.3)
CALCIUM SERPL-MCNC: 8.1 MG/DL — LOW (ref 8.4–10.5)
CALCIUM SERPL-MCNC: 8.5 MG/DL — SIGNIFICANT CHANGE UP (ref 8.4–10.5)
CHLORIDE SERPL-SCNC: 103 MMOL/L — SIGNIFICANT CHANGE UP (ref 96–108)
CHLORIDE SERPL-SCNC: 105 MMOL/L — SIGNIFICANT CHANGE UP (ref 96–108)
CO2 SERPL-SCNC: 21 MMOL/L — LOW (ref 22–31)
CO2 SERPL-SCNC: 22 MMOL/L — SIGNIFICANT CHANGE UP (ref 22–31)
CREAT SERPL-MCNC: 0.73 MG/DL — SIGNIFICANT CHANGE UP (ref 0.5–1.3)
CREAT SERPL-MCNC: 0.92 MG/DL — SIGNIFICANT CHANGE UP (ref 0.5–1.3)
EOSINOPHIL # BLD AUTO: 0 K/UL — SIGNIFICANT CHANGE UP (ref 0–0.5)
EOSINOPHIL NFR BLD AUTO: 0 % — SIGNIFICANT CHANGE UP (ref 0–6)
GAS PNL BLDV: 137 MMOL/L — LOW (ref 138–146)
GAS PNL BLDV: SIGNIFICANT CHANGE UP
GAS PNL BLDV: SIGNIFICANT CHANGE UP
GLUCOSE BLDC GLUCOMTR-MCNC: 123 MG/DL — HIGH (ref 70–99)
GLUCOSE BLDC GLUCOMTR-MCNC: 129 MG/DL — HIGH (ref 70–99)
GLUCOSE SERPL-MCNC: 109 MG/DL — HIGH (ref 70–99)
GLUCOSE SERPL-MCNC: 97 MG/DL — SIGNIFICANT CHANGE UP (ref 70–99)
HCO3 BLDV-SCNC: 25 MMOL/L — SIGNIFICANT CHANGE UP (ref 20–27)
HCT VFR BLD CALC: 22.2 % — LOW (ref 39–50)
HCT VFR BLD CALC: 26.6 % — LOW (ref 39–50)
HCT VFR BLD CALC: 28 % — LOW (ref 39–50)
HCT VFR BLD CALC: 32.7 % — LOW (ref 39–50)
HGB BLD-MCNC: 10.4 G/DL — LOW (ref 13–17)
HGB BLD-MCNC: 6.8 G/DL — CRITICAL LOW (ref 13–17)
HGB BLD-MCNC: 8.5 G/DL — LOW (ref 13–17)
HGB BLD-MCNC: 9 G/DL — LOW (ref 13–17)
HYPOCHROMIA BLD QL: SLIGHT — SIGNIFICANT CHANGE UP
INR BLD: 1.07 — SIGNIFICANT CHANGE UP (ref 0.88–1.16)
LYMPHOCYTES # BLD AUTO: 0.91 K/UL — LOW (ref 1–3.3)
LYMPHOCYTES # BLD AUTO: 7.9 % — LOW (ref 13–44)
MAGNESIUM SERPL-MCNC: 1.8 MG/DL — SIGNIFICANT CHANGE UP (ref 1.6–2.6)
MANUAL SMEAR VERIFICATION: SIGNIFICANT CHANGE UP
MCHC RBC-ENTMCNC: 30.6 GM/DL — LOW (ref 32–36)
MCHC RBC-ENTMCNC: 30.9 PG — SIGNIFICANT CHANGE UP (ref 27–34)
MCHC RBC-ENTMCNC: 31.3 PG — SIGNIFICANT CHANGE UP (ref 27–34)
MCHC RBC-ENTMCNC: 31.8 GM/DL — LOW (ref 32–36)
MCHC RBC-ENTMCNC: 31.8 PG — SIGNIFICANT CHANGE UP (ref 27–34)
MCHC RBC-ENTMCNC: 31.9 PG — SIGNIFICANT CHANGE UP (ref 27–34)
MCHC RBC-ENTMCNC: 32 GM/DL — SIGNIFICANT CHANGE UP (ref 32–36)
MCHC RBC-ENTMCNC: 32.1 GM/DL — SIGNIFICANT CHANGE UP (ref 32–36)
MCV RBC AUTO: 100.3 FL — HIGH (ref 80–100)
MCV RBC AUTO: 102.3 FL — HIGH (ref 80–100)
MCV RBC AUTO: 96.2 FL — SIGNIFICANT CHANGE UP (ref 80–100)
MCV RBC AUTO: 99.6 FL — SIGNIFICANT CHANGE UP (ref 80–100)
MONOCYTES # BLD AUTO: 0.3 K/UL — SIGNIFICANT CHANGE UP (ref 0–0.9)
MONOCYTES NFR BLD AUTO: 2.6 % — SIGNIFICANT CHANGE UP (ref 2–14)
NEUTROPHILS # BLD AUTO: 10.27 K/UL — HIGH (ref 1.8–7.4)
NEUTROPHILS NFR BLD AUTO: 89.5 % — HIGH (ref 43–77)
NRBC # BLD: 0 /100 WBCS — SIGNIFICANT CHANGE UP (ref 0–0)
OVALOCYTES BLD QL SMEAR: SLIGHT — SIGNIFICANT CHANGE UP
PCO2 BLDV: 53 MMHG — HIGH (ref 41–51)
PH BLDV: 7.29 — LOW (ref 7.32–7.43)
PHOSPHATE SERPL-MCNC: 3.4 MG/DL — SIGNIFICANT CHANGE UP (ref 2.5–4.5)
PLAT MORPH BLD: NORMAL — SIGNIFICANT CHANGE UP
PLATELET # BLD AUTO: 124 K/UL — LOW (ref 150–400)
PLATELET # BLD AUTO: 144 K/UL — LOW (ref 150–400)
PLATELET # BLD AUTO: 146 K/UL — LOW (ref 150–400)
PLATELET # BLD AUTO: 149 K/UL — LOW (ref 150–400)
PO2 BLDV: 87 MMHG — SIGNIFICANT CHANGE UP
POLYCHROMASIA BLD QL SMEAR: SLIGHT — SIGNIFICANT CHANGE UP
POTASSIUM BLDV-SCNC: 5.1 MMOL/L — HIGH (ref 3.5–4.9)
POTASSIUM SERPL-MCNC: 4.6 MMOL/L — SIGNIFICANT CHANGE UP (ref 3.5–5.3)
POTASSIUM SERPL-MCNC: 5.3 MMOL/L — SIGNIFICANT CHANGE UP (ref 3.5–5.3)
POTASSIUM SERPL-SCNC: 4.6 MMOL/L — SIGNIFICANT CHANGE UP (ref 3.5–5.3)
POTASSIUM SERPL-SCNC: 5.3 MMOL/L — SIGNIFICANT CHANGE UP (ref 3.5–5.3)
PROTHROM AB SERPL-ACNC: 12.8 SEC — SIGNIFICANT CHANGE UP (ref 10.6–13.6)
RBC # BLD: 2.17 M/UL — LOW (ref 4.2–5.8)
RBC # BLD: 2.67 M/UL — LOW (ref 4.2–5.8)
RBC # BLD: 2.91 M/UL — LOW (ref 4.2–5.8)
RBC # BLD: 3.26 M/UL — LOW (ref 4.2–5.8)
RBC # FLD: 13.7 % — SIGNIFICANT CHANGE UP (ref 10.3–14.5)
RBC # FLD: 14.1 % — SIGNIFICANT CHANGE UP (ref 10.3–14.5)
RBC # FLD: 14.1 % — SIGNIFICANT CHANGE UP (ref 10.3–14.5)
RBC # FLD: 14.4 % — SIGNIFICANT CHANGE UP (ref 10.3–14.5)
RBC BLD AUTO: ABNORMAL
RH IG SCN BLD-IMP: POSITIVE — SIGNIFICANT CHANGE UP
RH IG SCN BLD-IMP: POSITIVE — SIGNIFICANT CHANGE UP
SAO2 % BLDV: 96 % — SIGNIFICANT CHANGE UP
SARS-COV-2 RNA SPEC QL NAA+PROBE: SIGNIFICANT CHANGE UP
SODIUM SERPL-SCNC: 135 MMOL/L — SIGNIFICANT CHANGE UP (ref 135–145)
SODIUM SERPL-SCNC: 137 MMOL/L — SIGNIFICANT CHANGE UP (ref 135–145)
WBC # BLD: 11.29 K/UL — HIGH (ref 3.8–10.5)
WBC # BLD: 11.48 K/UL — HIGH (ref 3.8–10.5)
WBC # BLD: 8.72 K/UL — SIGNIFICANT CHANGE UP (ref 3.8–10.5)
WBC # BLD: 9.3 K/UL — SIGNIFICANT CHANGE UP (ref 3.8–10.5)
WBC # FLD AUTO: 11.29 K/UL — HIGH (ref 3.8–10.5)
WBC # FLD AUTO: 11.48 K/UL — HIGH (ref 3.8–10.5)
WBC # FLD AUTO: 8.72 K/UL — SIGNIFICANT CHANGE UP (ref 3.8–10.5)
WBC # FLD AUTO: 9.3 K/UL — SIGNIFICANT CHANGE UP (ref 3.8–10.5)

## 2021-01-25 PROCEDURE — 93010 ELECTROCARDIOGRAM REPORT: CPT

## 2021-01-25 PROCEDURE — 99223 1ST HOSP IP/OBS HIGH 75: CPT | Mod: GC

## 2021-01-25 PROCEDURE — 99285 EMERGENCY DEPT VISIT HI MDM: CPT

## 2021-01-25 PROCEDURE — 71045 X-RAY EXAM CHEST 1 VIEW: CPT | Mod: 26

## 2021-01-25 PROCEDURE — 45378 DIAGNOSTIC COLONOSCOPY: CPT

## 2021-01-25 PROCEDURE — 99223 1ST HOSP IP/OBS HIGH 75: CPT | Mod: 25

## 2021-01-25 RX ORDER — TIMOLOL 0.5 %
1 DROPS OPHTHALMIC (EYE)
Qty: 0 | Refills: 0 | DISCHARGE

## 2021-01-25 RX ORDER — SODIUM CHLORIDE 9 MG/ML
1000 INJECTION, SOLUTION INTRAVENOUS
Refills: 0 | Status: DISCONTINUED | OUTPATIENT
Start: 2021-01-25 | End: 2021-01-26

## 2021-01-25 RX ORDER — METOPROLOL TARTRATE 50 MG
12.5 TABLET ORAL
Qty: 0 | Refills: 0 | DISCHARGE

## 2021-01-25 RX ORDER — METOPROLOL TARTRATE 50 MG
0 TABLET ORAL
Qty: 0 | Refills: 0 | DISCHARGE

## 2021-01-25 RX ORDER — MAGNESIUM SULFATE 500 MG/ML
1 VIAL (ML) INJECTION ONCE
Refills: 0 | Status: COMPLETED | OUTPATIENT
Start: 2021-01-25 | End: 2021-01-25

## 2021-01-25 RX ORDER — METOPROLOL TARTRATE 50 MG
2.5 TABLET ORAL EVERY 6 HOURS
Refills: 0 | Status: DISCONTINUED | OUTPATIENT
Start: 2021-01-25 | End: 2021-01-25

## 2021-01-25 RX ORDER — DEXTROSE 50 % IN WATER 50 %
25 SYRINGE (ML) INTRAVENOUS ONCE
Refills: 0 | Status: DISCONTINUED | OUTPATIENT
Start: 2021-01-25 | End: 2021-01-26

## 2021-01-25 RX ORDER — SOD SULF/SODIUM/NAHCO3/KCL/PEG
2000 SOLUTION, RECONSTITUTED, ORAL ORAL ONCE
Refills: 0 | Status: COMPLETED | OUTPATIENT
Start: 2021-01-25 | End: 2021-01-25

## 2021-01-25 RX ORDER — AMLODIPINE BESYLATE 2.5 MG/1
5 TABLET ORAL
Qty: 0 | Refills: 0 | DISCHARGE

## 2021-01-25 RX ORDER — PANTOPRAZOLE SODIUM 20 MG/1
40 TABLET, DELAYED RELEASE ORAL
Refills: 0 | Status: DISCONTINUED | OUTPATIENT
Start: 2021-01-26 | End: 2021-02-02

## 2021-01-25 RX ORDER — TIMOLOL 0.5 %
1 DROPS OPHTHALMIC (EYE) DAILY
Refills: 0 | Status: DISCONTINUED | OUTPATIENT
Start: 2021-01-25 | End: 2021-02-02

## 2021-01-25 RX ORDER — INSULIN LISPRO 100/ML
VIAL (ML) SUBCUTANEOUS EVERY 6 HOURS
Refills: 0 | Status: DISCONTINUED | OUTPATIENT
Start: 2021-01-25 | End: 2021-01-26

## 2021-01-25 RX ORDER — SODIUM CHLORIDE 9 MG/ML
1000 INJECTION INTRAMUSCULAR; INTRAVENOUS; SUBCUTANEOUS
Refills: 0 | Status: DISCONTINUED | OUTPATIENT
Start: 2021-01-25 | End: 2021-01-26

## 2021-01-25 RX ORDER — AMLODIPINE BESYLATE 2.5 MG/1
0 TABLET ORAL
Qty: 0 | Refills: 0 | DISCHARGE

## 2021-01-25 RX ORDER — LATANOPROST 0.05 MG/ML
1 SOLUTION/ DROPS OPHTHALMIC; TOPICAL
Qty: 0 | Refills: 0 | DISCHARGE

## 2021-01-25 RX ORDER — GLUCAGON INJECTION, SOLUTION 0.5 MG/.1ML
1 INJECTION, SOLUTION SUBCUTANEOUS ONCE
Refills: 0 | Status: DISCONTINUED | OUTPATIENT
Start: 2021-01-25 | End: 2021-01-26

## 2021-01-25 RX ORDER — DEXTROSE 50 % IN WATER 50 %
12.5 SYRINGE (ML) INTRAVENOUS ONCE
Refills: 0 | Status: DISCONTINUED | OUTPATIENT
Start: 2021-01-25 | End: 2021-01-26

## 2021-01-25 RX ORDER — SODIUM CHLORIDE 9 MG/ML
1000 INJECTION INTRAMUSCULAR; INTRAVENOUS; SUBCUTANEOUS ONCE
Refills: 0 | Status: COMPLETED | OUTPATIENT
Start: 2021-01-25 | End: 2021-01-25

## 2021-01-25 RX ORDER — DEXTROSE 50 % IN WATER 50 %
15 SYRINGE (ML) INTRAVENOUS ONCE
Refills: 0 | Status: DISCONTINUED | OUTPATIENT
Start: 2021-01-25 | End: 2021-01-26

## 2021-01-25 RX ORDER — PANTOPRAZOLE SODIUM 20 MG/1
40 TABLET, DELAYED RELEASE ORAL ONCE
Refills: 0 | Status: COMPLETED | OUTPATIENT
Start: 2021-01-25 | End: 2021-01-25

## 2021-01-25 RX ORDER — PILOCARPINE HCL 4 %
1 DROPS OPHTHALMIC (EYE) ONCE
Refills: 0 | Status: COMPLETED | OUTPATIENT
Start: 2021-01-25 | End: 2021-01-25

## 2021-01-25 RX ORDER — PILOCARPINE HCL 4 %
0 DROPS OPHTHALMIC (EYE)
Qty: 0 | Refills: 0 | DISCHARGE

## 2021-01-25 RX ORDER — SOD SULF/SODIUM/NAHCO3/KCL/PEG
2000 SOLUTION, RECONSTITUTED, ORAL ORAL ONCE
Refills: 0 | Status: DISCONTINUED | OUTPATIENT
Start: 2021-01-25 | End: 2021-01-25

## 2021-01-25 RX ADMIN — SODIUM CHLORIDE 1000 MILLILITER(S): 9 INJECTION INTRAMUSCULAR; INTRAVENOUS; SUBCUTANEOUS at 14:11

## 2021-01-25 RX ADMIN — Medication 1 DROP(S): at 15:45

## 2021-01-25 RX ADMIN — SODIUM CHLORIDE 120 MILLILITER(S): 9 INJECTION INTRAMUSCULAR; INTRAVENOUS; SUBCUTANEOUS at 01:40

## 2021-01-25 RX ADMIN — Medication 2000 MILLILITER(S): at 10:45

## 2021-01-25 RX ADMIN — SODIUM CHLORIDE 1000 MILLILITER(S): 9 INJECTION INTRAMUSCULAR; INTRAVENOUS; SUBCUTANEOUS at 01:05

## 2021-01-25 RX ADMIN — Medication 1 DROP(S): at 01:00

## 2021-01-25 RX ADMIN — Medication 100 GRAM(S): at 11:57

## 2021-01-25 RX ADMIN — PANTOPRAZOLE SODIUM 40 MILLIGRAM(S): 20 TABLET, DELAYED RELEASE ORAL at 23:04

## 2021-01-25 NOTE — H&P ADULT - ASSESSMENT
87 y/o M pmh HTN, COPD, GERALD (on CPAP) Diverticulosis, Hemorrhoids s/p hemorrhoidectomy (2018), Glaucoma, Aortic Root Aneurysm, L Inguinal hernia p/w blood per rectum. Afebrile, HD stable,  WBC 13.12 H/H 11.3/36.0, K 6.1, Cr 1.09. Presentation due to diverticulosis vs hemorrhoids.     Admit to Dr. Courtney, Tele, Team 2   Pain/Nausea  NPO/IVF  Protonix  GI consult for Colonoscopy  Repeat BMP for K of 6.1  Home CPAP  CBC if episode unstable or BRBPR  AM Labs

## 2021-01-25 NOTE — H&P ADULT - NSHPLABSRESULTS_GEN_ALL_CORE
LABS:                        11.3   13.12 )-----------( 146      ( 24 Jan 2021 23:09 )             36.0     01-24    139  |  103  |  25<H>  ----------------------------<  132<H>  6.1<H>   |  27  |  1.09    Ca    9.2      24 Jan 2021 23:09    TPro  6.5  /  Alb  3.8  /  TBili  0.4  /  DBili  x   /  AST  25  /  ALT  22  /  AlkPhos  60  01-24    PT/INR - ( 24 Jan 2021 23:09 )   PT: 12.8 sec;   INR: 1.07          PTT - ( 24 Jan 2021 23:09 )  PTT:24.8 sec      RADIOLOGY & ADDITIONAL STUDIES:

## 2021-01-25 NOTE — H&P ADULT - HISTORY OF PRESENT ILLNESS
85 y/o M pmh HTN, COPD, GERALD (on CPAP) Diverticulosis, Hemorrhoids s/p hemorrhoidectomy (2018), Glaucoma, Aortic Root Aneurysm, L Inguinal hernia p/w blood per rectum. Patient previously admitted 5/2018 with blood per rectum. Received 3 units of PRBC during admission. Colonoscopy revealed large diverticula w/ clots, but active bleeding. Bleeding stopped w/o intervention. One additional episode 10/2018 w/ noted thrombosed external hemorrhoids. Hemorrhoidectomy performed in office w/o additional episodes. He now returns with one episode described as bright blood filling the toilet bowl w/o stool began at 9pm tonight. Initially noted blood on TP before episode. States briefly lightheaded but resolved. No Bms since the episode. Continues to tolerate diet. Denies abdominal pain, sob, cp, fevers, chills,, nausea, emesis. Last colonoscopy 3 years ago revealed hemorrhoids and diverticulosis. No EGDs in the past.      In the ED vitals were as follows: HR 74-84, BP 99/67--> 120/78, Sa 97-98% on RA. WBC 13.12 H/H 11.3/36.0, K 6.1, Cr 1.09. Received Protonix 1L NS bolus.

## 2021-01-25 NOTE — PROGRESS NOTE ADULT - ASSESSMENT
87 yo M w/ PMH of HTN, COPD, GERALD (on CPAP) Diverticulosis, Hemorrhoids s/p hemorrhoidectomy (2018), Glaucoma, Aortic Root Aneurysm, L Inguinal hernia p/w blood per rectum. Afebrile, HD stable,  WBC 13.12 H/H 11.3/36.0, K 6.1, Cr 1.09. Presentation due to diverticulosis vs hemorrhoids.       Pain/Nausea  NPO/IVF  Protonix  GI consult for Colonoscopy  Repeat BMP for K of 6.1  Home CPAP  CBC if episode unstable or BRBPR  AM Labs

## 2021-01-25 NOTE — CONSULT NOTE ADULT - ATTENDING COMMENTS
Agree with above, pan diverticulosis and hx of similar self resolved bleeding for medical mgmt
Patient seen and examined with house-staff during bedside rounds.  Resident note read, including vitals, physical findings, laboratory data, and radiological reports.   Revisions included below.  Direct personal management at bed side and extensive interpretation of the data.  Plan was outlined and discussed in details with the housestaff.  Decision making of high complexity  Action taken for acute disease activity to reflect the level of care provided:  - medication reconciliation  - review laboratory data      The patient was seen and examined.  The patient had colonoscopy with no evidence of acute bleed.  Hemoglobin is stable.  No evidence of acute coronary syndrome.  Follow-up on hemoglobin.

## 2021-01-25 NOTE — CONSULT NOTE ADULT - SUBJECTIVE AND OBJECTIVE BOX
HPI:  87 y/o M pmh HTN, COPD, GERALD (on CPAP) Diverticulosis, Hemorrhoids s/p hemorrhoidectomy (2018), Glaucoma, Aortic Root Aneurysm, L Inguinal hernia p/w blood per rectum. Patient previously admitted 5/2018 with blood per rectum. Received 3 units of PRBC during admission. Colonoscopy revealed large diverticula w/ clots, but no active bleeding. Bleeding stopped w/o intervention. One additional episode 10/2018 w/ noted thrombosed external hemorrhoids. Hemorrhoidectomy performed in office w/o additional episodes. He now returns with one episode described as bright blood filling the toilet bowl w/o stool began at 9pm tonight. Initially noted blood on TP before episode. States briefly lightheaded but resolved. No Bms since the episode. Continues to tolerate diet. Denies abdominal pain, sob, cp, fevers, chills,, nausea, emesis. Last colonoscopy 3 years ago revealed hemorrhoids and diverticulosis. No EGDs in the past.      In the ED vitals were as follows: HR 74-84, BP 99/67--> 120/78, Sa 97-98% on RA. WBC 13.12 H/H 11.3/36.0, K 6.1, Cr 1.09. Received Protonix 1L NS bolus.  (25 Jan 2021 00:30)    Patient's Hb continued to down trend from 11.3 at admission to 9 in the AM and hence patient was transferred to the SICU for close HD monitoring and bedside scope by GI.    PAST MEDICAL & SURGICAL HISTORY:  COPD (chronic obstructive pulmonary disease)    Sleep apnea    Hernia    Hemorrhoids    Hypertension    S/P hemorrhoidectomy        ROS: See HPI    MEDICATIONS  (STANDING):  magnesium sulfate  IVPB 1 Gram(s) IV Intermittent once  sodium chloride 0.9%. 1000 milliLiter(s) (120 mL/Hr) IV Continuous <Continuous>  timolol 0.5% Solution 1 Drop(s) Both EYES daily    MEDICATIONS  (PRN):      Allergies    No Known Allergies    Intolerances        SOCIAL HISTORY:  Smoke: Never Smoker  EtOH: occasional    FAMILY HISTORY:  No pertinent family history in first degree relatives        Vital Signs Last 24 Hrs  T(C): 36.9 (25 Jan 2021 09:00), Max: 37 (25 Jan 2021 04:55)  T(F): 98.4 (25 Jan 2021 09:00), Max: 98.6 (25 Jan 2021 04:55)  HR: 104 (25 Jan 2021 08:46) (73 - 104)  BP: 117/52 (25 Jan 2021 08:46) (99/67 - 128/71)  BP(mean): 75 (25 Jan 2021 08:46) (75 - 79)  RR: 18 (25 Jan 2021 08:46) (16 - 18)  SpO2: 96% (25 Jan 2021 08:46) (96% - 98%)    PHYSICAL EXAM    Gen: NAD   Neuro: A&oX3 no neuro deficits  HEENT:   CV: RRR reg s1s2 no M  Pulm: CTA B/L no w/w/r  Abd: Soft, NT/ND  Ext: No C/C/E  Skin: No rashes erythema or ecchymosis  MSK: No joint swelling noted  Psych: Normal affect     LABS:                        9.0    11.29 )-----------( 146      ( 25 Jan 2021 09:25 )             28.0     01-25    137  |  105  |  17  ----------------------------<  97  4.6   |  22  |  0.73    Ca    8.1<L>      25 Jan 2021 09:25  Phos  3.4     01-25  Mg     1.8     01-25    TPro  6.5  /  Alb  3.8  /  TBili  0.4  /  DBili  x   /  AST  25  /  ALT  22  /  AlkPhos  60  01-24    PT/INR - ( 25 Jan 2021 07:51 )   PT: 12.8 sec;   INR: 1.07          PTT - ( 25 Jan 2021 07:51 )  PTT:25.7 sec      RADIOLOGY & ADDITIONAL STUDIES:    Assessment and Plan:  86yMale HPI:  85 y/o M pmh HTN, COPD, GERALD (on CPAP) Diverticulosis, Hemorrhoids s/p hemorrhoidectomy (2018), Glaucoma, Aortic Root Aneurysm (Followed by Dr. Fowler), L Inguinal hernia p/w blood per rectum. Patient previously admitted 5/2018 with blood per rectum. Received 3 units of PRBC during that admission and colonoscopy revealed large diverticula w/ clots, but no active bleeding. Bleeding stopped w/o intervention. One additional episode 10/2018 w/ noted thrombosed external hemorrhoids. Hemorrhoidectomy performed in office w/o additional episodes.     He now returns with one episode described as bright blood filling the toilet bowl w/o stool began at 9pm 1/24/21. Initially noted blood on TP before episode. States briefly lightheaded but resolved. After admission, he had a second episode of bleeding at 4 am 1/25. Continues to tolerate diet. Denies abdominal pain, sob, cp, fevers, chills,, nausea, emesis. Last colonoscopy 3 years ago revealed hemorrhoids and diverticulosis. No EGDs in the past. He took aspirin 1/24/21 in the AM.    In the ED vitals were as follows: HR 74-84, BP 99/67--> 120/78, Sa 97-98% on RA. WBC 13.12 H/H 11.3/36.0, K 6.1, Cr 1.09. Received Protonix 1L NS bolus.  (25 Jan 2021 00:30)    Patient's Hb continued to down trend from 11.3 at admission to 9 in the AM and hence patient was transferred to the SICU for close HD monitoring and bedside scope by GI.    PAST MEDICAL & SURGICAL HISTORY:  COPD (chronic obstructive pulmonary disease)    Sleep apnea    Hernia    Hemorrhoids    Hypertension    S/P hemorrhoidectomy        ROS: See HPI    MEDICATIONS  (STANDING):  magnesium sulfate  IVPB 1 Gram(s) IV Intermittent once  sodium chloride 0.9%. 1000 milliLiter(s) (120 mL/Hr) IV Continuous <Continuous>  timolol 0.5% Solution 1 Drop(s) Both EYES daily    MEDICATIONS  (PRN):      Allergies    No Known Allergies    Intolerances        SOCIAL HISTORY:  Smoke: Never Smoker  EtOH: occasional    FAMILY HISTORY:  No pertinent family history in first degree relatives        Vital Signs Last 24 Hrs  T(C): 36.9 (25 Jan 2021 09:00), Max: 37 (25 Jan 2021 04:55)  T(F): 98.4 (25 Jan 2021 09:00), Max: 98.6 (25 Jan 2021 04:55)  HR: 104 (25 Jan 2021 08:46) (73 - 104)  BP: 117/52 (25 Jan 2021 08:46) (99/67 - 128/71)  BP(mean): 75 (25 Jan 2021 08:46) (75 - 79)  RR: 18 (25 Jan 2021 08:46) (16 - 18)  SpO2: 96% (25 Jan 2021 08:46) (96% - 98%)    PHYSICAL EXAM    Gen: NAD   Neuro: A&oX3 no neuro deficits  HEENT:   CV: RRR reg s1s2 no M  Pulm: CTA B/L no w/w/r  Abd: Soft, NT/ND  Ext: No C/C/E  Skin: No rashes erythema or ecchymosis  MSK: No joint swelling noted  Psych: Normal affect     LABS:                        9.0    11.29 )-----------( 146      ( 25 Jan 2021 09:25 )             28.0     01-25    137  |  105  |  17  ----------------------------<  97  4.6   |  22  |  0.73    Ca    8.1<L>      25 Jan 2021 09:25  Phos  3.4     01-25  Mg     1.8     01-25    TPro  6.5  /  Alb  3.8  /  TBili  0.4  /  DBili  x   /  AST  25  /  ALT  22  /  AlkPhos  60  01-24    PT/INR - ( 25 Jan 2021 07:51 )   PT: 12.8 sec;   INR: 1.07          PTT - ( 25 Jan 2021 07:51 )  PTT:25.7 sec      RADIOLOGY & ADDITIONAL STUDIES:    Assessment and Plan:  86yMale

## 2021-01-25 NOTE — CONSULT NOTE ADULT - SUBJECTIVE AND OBJECTIVE BOX
HPI:  87 y/o M pmh HTN, COPD, GERALD (on CPAP) Diverticulosis, Hemorrhoids s/p hemorrhoidectomy (2018), Glaucoma, Aortic Root Aneurysm, L Inguinal hernia p/w blood per rectum. Patient previously admitted 5/2018 with blood per rectum. Received 3 units of PRBC during admission. Colonoscopy revealed large diverticula w/ clots, but active bleeding. Bleeding stopped w/o intervention. One additional episode 10/2018 w/ noted thrombosed external hemorrhoids. Hemorrhoidectomy performed in office w/o additional episodes.    He now returns with complaints of painless hematochezia. Patient reports 2 episodes of bright red blood filling the toilet bowel last night. He denies abd pain, nausea, vomiting, hematemesis. He takes aspirin daily, but denies NSAID use.     Upon admission, he has had three more episodes of BRBPR, and has been transferred to the SICU for closer monitoring    Allergies    No Known Allergies    Intolerances      Home Medications:  amLODIPine: 5 milligram(s) orally once a day (25 Jan 2021 01:13)  aspirin 81 mg oral tablet: orally once a day (25 Jan 2021 01:15)  latanoprost 0.005% ophthalmic solution: 1 drop(s) to each affected eye once a day (in the evening) (25 Jan 2021 01:14)  metoprolol: 12.5 milligram(s) orally 2 times a day (25 Jan 2021 01:13)  pilocarpine nitrate 4% ophthalmic solution:  (25 Jan 2021 01:14)  timolol hemihydrate 0.5% ophthalmic solution: 1 drop(s) to each affected eye 2 times a day (25 Jan 2021 01:14)    MEDICATIONS:  MEDICATIONS  (STANDING):  sodium chloride 0.9% Bolus 1000 milliLiter(s) IV Bolus once  sodium chloride 0.9%. 1000 milliLiter(s) (120 mL/Hr) IV Continuous <Continuous>  timolol 0.5% Solution 1 Drop(s) Both EYES daily    MEDICATIONS  (PRN):    PAST MEDICAL & SURGICAL HISTORY:  COPD (chronic obstructive pulmonary disease)    Sleep apnea    Hernia    Hemorrhoids    Hypertension    S/P hemorrhoidectomy      FAMILY HISTORY:  No pertinent family history in first degree relatives      SOCIAL HISTORY:  [place]: denies  Alcohol: denies  Illicit Drugs: denies    REVIEW OF SYSTEMS:  CONSTITUTIONAL: No weakness, fevers or chills  HEENT: No visual changes; No vertigo or throat pain   NECK: No pain or stiffness  RESPIRATORY: No cough, wheezing, hemoptysis; No shortness of breath  CARDIOVASCULAR: No chest pain or palpitations  GASTROINTESTINAL: + hematochezia.  GENITOURINARY: No dysuria, frequency or hematuria  NEUROLOGICAL: No numbness or weakness  SKIN: No itching, burning, rashes, or lesions   All other 10 review of systems is negative unless indicated above.    Vital Signs Last 24 Hrs  T(C): 36.9 (25 Jan 2021 09:00), Max: 37 (25 Jan 2021 04:55)  T(F): 98.4 (25 Jan 2021 09:00), Max: 98.6 (25 Jan 2021 04:55)  HR: 90 (25 Jan 2021 13:00) (73 - 104)  BP: 118/60 (25 Jan 2021 13:00) (99/67 - 128/71)  BP(mean): 93 (25 Jan 2021 13:00) (75 - 93)  RR: 18 (25 Jan 2021 13:00) (16 - 18)  SpO2: 97% (25 Jan 2021 13:00) (96% - 98%)    01-24 @ 07:01  -  01-25 @ 07:00  --------------------------------------------------------  IN: 240 mL / OUT: 0 mL / NET: 240 mL        PHYSICAL EXAM:    General: Well developed; well nourished; in no acute distress  Eyes: Anicteric sclerae, moist conjunctivae  HENT: Moist mucous membranes  Neck: Trachea midline, supple  Lungs: Normal respiratory effors and no intercostal retractions  Cardiovascular: RRR  Abdomen: Soft, non-tender non-distended; Normal bowel sounds; No rebound or guarding  Extremities: Normal range of motion, No clubbing, cyanosis or edema  Neurological: Alert and oriented x3  Skin: Warm and dry. No obvious rash  Rectal: no fissure or hemorrhoid seen, streaks of bright red blood     LABS:                        8.5    8.72  )-----------( 144      ( 25 Jan 2021 13:41 )             26.6     01-25    137  |  105  |  17  ----------------------------<  97  4.6   |  22  |  0.73    Ca    8.1<L>      25 Jan 2021 09:25  Phos  3.4     01-25  Mg     1.8     01-25    TPro  6.5  /  Alb  3.8  /  TBili  0.4  /  DBili  x   /  AST  25  /  ALT  22  /  AlkPhos  60  01-24        PT/INR - ( 25 Jan 2021 07:51 )   PT: 12.8 sec;   INR: 1.07          PTT - ( 25 Jan 2021 07:51 )  PTT:25.7 sec    RADIOLOGY & ADDITIONAL STUDIES:

## 2021-01-25 NOTE — CONSULT NOTE ADULT - ASSESSMENT
87 y/o M pmh HTN, COPD, GERALD (on CPAP) Diverticulosis, Hemorrhoids s/p hemorrhoidectomy (2018), Glaucoma, Aortic Root Aneurysm, L Inguinal hernia p/w blood per rectum.    Hemtochezia  H/H has downtrended since admission ( 11-->8.5), vitals significant for tachycardia  High suspicion for diverticular bleed, given his past admissions and previous colonoscopies   Patient has completed moviprep and will plan for cscope this afternoon to further evaluate

## 2021-01-25 NOTE — H&P ADULT - NSHPPHYSICALEXAM_GEN_ALL_CORE
ICU Vital Signs Last 24 Hrs  T(C): 36.5 (24 Jan 2021 23:05), Max: 36.5 (24 Jan 2021 23:05)  T(F): 97.7 (24 Jan 2021 23:05), Max: 97.7 (24 Jan 2021 23:05)  HR: 73 (25 Jan 2021 01:01) (73 - 84)  BP: 128/71 (25 Jan 2021 01:01) (99/67 - 128/71)  BP(mean): --  ABP: --  ABP(mean): --  RR: 16 (25 Jan 2021 01:01) (16 - 16)  SpO2: 98% (25 Jan 2021 01:01) (97% - 98%)    PHYSICAL EXAM:  General: NAD, resting comfortably in bed  C/V: NSR  Pulm: Nonlabored breathing, no respiratory distress  Abd: soft, non-tender, non-distended, no rboeund or guarding  ALAN: No external hemorrhoids, no palpable or pulsitile internal masses, dark stool on glove, no brb  Extrem: WWP, no edema,  Neuro: A/O x 3, CNs II-XII grossly intact, no focal deficits, normal sensation

## 2021-01-25 NOTE — PROGRESS NOTE ADULT - SUBJECTIVE AND OBJECTIVE BOX
SUBJECTIVE: Patient seen and examined by chief resident on morning rounds.        Vital Signs Last 24 Hrs  T(C): 37 (25 Jan 2021 04:55), Max: 37 (25 Jan 2021 04:55)  T(F): 98.6 (25 Jan 2021 04:55), Max: 98.6 (25 Jan 2021 04:55)  HR: 104 (25 Jan 2021 04:55) (73 - 104)  BP: 111/59 (25 Jan 2021 04:55) (99/67 - 128/71)  BP(mean): 79 (25 Jan 2021 04:55) (79 - 79)  RR: 18 (25 Jan 2021 04:55) (16 - 18)  SpO2: 97% (25 Jan 2021 04:55) (97% - 98%)    Physical Exam:  General: NAD  Pulmonary: Nonlabored breathing, no respiratory distress  Abdominal: soft, nondistended, nontender with no rebound or guarding  Extremities: WWP, normal strength, no clubbing/cyanosis/edema  Neuro: A/O x3    Lines/drains/tubes:    I&O's Summary    24 Jan 2021 07:01  -  25 Jan 2021 06:13  --------------------------------------------------------  IN: 240 mL / OUT: 0 mL / NET: 240 mL        LABS:                        10.4   11.48 )-----------( 149      ( 25 Jan 2021 02:45 )             32.7     01-25    135  |  103  |  24<H>  ----------------------------<  109<H>  5.3   |  21<L>  |  0.92    Ca    8.5      25 Jan 2021 02:32    TPro  6.5  /  Alb  3.8  /  TBili  0.4  /  DBili  x   /  AST  25  /  ALT  22  /  AlkPhos  60  01-24    PT/INR - ( 24 Jan 2021 23:09 )   PT: 12.8 sec;   INR: 1.07          PTT - ( 24 Jan 2021 23:09 )  PTT:24.8 sec    CAPILLARY BLOOD GLUCOSE        LIVER FUNCTIONS - ( 24 Jan 2021 23:09 )  Alb: 3.8 g/dL / Pro: 6.5 g/dL / ALK PHOS: 60 U/L / ALT: 22 U/L / AST: 25 U/L / GGT: x             RADIOLOGY & ADDITIONAL STUDIES:     SUBJECTIVE: Overnight the patient was admitted. He had another episode of bloody bowel movement this morning at around 5 a.m. The patient was examined at the bedside by the chief resident. He denies having dizziness, chills, SOB, lightheadedness, chest pain. The patient Hgb on admission was 11.3, the repeat Hgb was 10.4.      Vital Signs Last 24 Hrs  T(C): 37 (25 Jan 2021 04:55), Max: 37 (25 Jan 2021 04:55)  T(F): 98.6 (25 Jan 2021 04:55), Max: 98.6 (25 Jan 2021 04:55)  HR: 104 (25 Jan 2021 04:55) (73 - 104)  BP: 111/59 (25 Jan 2021 04:55) (99/67 - 128/71)  BP(mean): 79 (25 Jan 2021 04:55) (79 - 79)  RR: 18 (25 Jan 2021 04:55) (16 - 18)  SpO2: 97% (25 Jan 2021 04:55) (97% - 98%)    Physical Exam:  General: NAD, resting comfortably in the bed  Pulmonary: Nonlabored breathing, no respiratory distress  Abdominal: soft, NT/ND, no rebound tenderness, guarding, rigidity  Extremities: WWP, normal strength, no clubbing/cyanosis/edema  Neuro: AAOx3    Lines/drains/tubes:    I&O's Summary    24 Jan 2021 07:01  -  25 Jan 2021 06:13  --------------------------------------------------------  IN: 240 mL / OUT: 0 mL / NET: 240 mL        LABS:                        10.4   11.48 )-----------( 149      ( 25 Jan 2021 02:45 )             32.7     01-25    135  |  103  |  24<H>  ----------------------------<  109<H>  5.3   |  21<L>  |  0.92    Ca    8.5      25 Jan 2021 02:32    TPro  6.5  /  Alb  3.8  /  TBili  0.4  /  DBili  x   /  AST  25  /  ALT  22  /  AlkPhos  60  01-24    PT/INR - ( 24 Jan 2021 23:09 )   PT: 12.8 sec;   INR: 1.07          PTT - ( 24 Jan 2021 23:09 )  PTT:24.8 sec    CAPILLARY BLOOD GLUCOSE        LIVER FUNCTIONS - ( 24 Jan 2021 23:09 )  Alb: 3.8 g/dL / Pro: 6.5 g/dL / ALK PHOS: 60 U/L / ALT: 22 U/L / AST: 25 U/L / GGT: x             RADIOLOGY & ADDITIONAL STUDIES:

## 2021-01-25 NOTE — CONSULT NOTE ADULT - ASSESSMENT
87 yo M w/ PMH of HTN, COPD, GERALD (on CPAP) Diverticulosis, Hemorrhoids s/p hemorrhoidectomy (2018), Glaucoma, Aortic Root Aneurysm, L Inguinal hernia p/w blood per rectum. Afebrile, HD stable,  WBC 13.12 H/H 11.3/36.0, K 6.1, Cr 1.09. Presentation due to diverticulosis vs hemorrhoids.     Neuro: Pain control with tylenol.  CV: MAP goal > 65,   Pulm: satting well NC  GI/FEN: NS@, protonix BID IV, PRN Electrolyte repletion  : Voiding/Curtis?  ID: MONISHA  Endo: SSI?  Heme: Hb downtrending from 11 to 9. Holding off anticoagulation. Anticoag?  PPX: SCDs  Lines: PIVs, A line? Curtis?  Wounds: none  PT/OT:   Dispo: SICU 87 yo M w/ PMH of HTN, COPD, GERALD (on CPAP) Diverticulosis, Hemorrhoids s/p hemorrhoidectomy (2018), Glaucoma, Aortic Root Aneurysm, L Inguinal hernia p/w blood per rectum. Afebrile, HD stable,  WBC 13.12 H/H 11.3/36.0, K 6.1, Cr 1.09. Presentation due to diverticulosis vs hemorrhoids.     Neuro: Pain control with tylenol.  CV: MAP goal > 65,   Pulm: satting well NC  GI/FEN: NS@120 ml/hr, protonix BID IV, PRN Electrolyte repletion. CLD.   : Voiding spontaneously  ID: MONISHA  Endo: SSI  Heme: Hb downtrending from 11 to 9. Holding off anticoagulation. Colonoscopy by GI  1/25.  PPX: SCDs  Lines: PIVs  Wounds: none  PT/OT:   Dispo: SICU

## 2021-01-25 NOTE — H&P ADULT - NSICDXPASTMEDICALHX_GEN_ALL_CORE_FT
PAST MEDICAL HISTORY:  COPD (chronic obstructive pulmonary disease)     Hemorrhoids     Hernia     Hypertension     Sleep apnea

## 2021-01-26 LAB
ANION GAP SERPL CALC-SCNC: 7 MMOL/L — SIGNIFICANT CHANGE UP (ref 5–17)
BUN SERPL-MCNC: 14 MG/DL — SIGNIFICANT CHANGE UP (ref 7–23)
CALCIUM SERPL-MCNC: 8 MG/DL — LOW (ref 8.4–10.5)
CHLORIDE SERPL-SCNC: 111 MMOL/L — HIGH (ref 96–108)
CO2 SERPL-SCNC: 25 MMOL/L — SIGNIFICANT CHANGE UP (ref 22–31)
CREAT SERPL-MCNC: 0.79 MG/DL — SIGNIFICANT CHANGE UP (ref 0.5–1.3)
GLUCOSE BLDC GLUCOMTR-MCNC: 117 MG/DL — HIGH (ref 70–99)
GLUCOSE BLDC GLUCOMTR-MCNC: 142 MG/DL — HIGH (ref 70–99)
GLUCOSE SERPL-MCNC: 130 MG/DL — HIGH (ref 70–99)
HCT VFR BLD CALC: 23.7 % — LOW (ref 39–50)
HCT VFR BLD CALC: 24.3 % — LOW (ref 39–50)
HCT VFR BLD CALC: 24.8 % — LOW (ref 39–50)
HCT VFR BLD CALC: 26.1 % — LOW (ref 39–50)
HGB BLD-MCNC: 7.6 G/DL — LOW (ref 13–17)
HGB BLD-MCNC: 7.6 G/DL — LOW (ref 13–17)
HGB BLD-MCNC: 7.9 G/DL — LOW (ref 13–17)
HGB BLD-MCNC: 8.2 G/DL — LOW (ref 13–17)
MAGNESIUM SERPL-MCNC: 2 MG/DL — SIGNIFICANT CHANGE UP (ref 1.6–2.6)
MCHC RBC-ENTMCNC: 30.6 PG — SIGNIFICANT CHANGE UP (ref 27–34)
MCHC RBC-ENTMCNC: 30.8 PG — SIGNIFICANT CHANGE UP (ref 27–34)
MCHC RBC-ENTMCNC: 30.8 PG — SIGNIFICANT CHANGE UP (ref 27–34)
MCHC RBC-ENTMCNC: 31.1 PG — SIGNIFICANT CHANGE UP (ref 27–34)
MCHC RBC-ENTMCNC: 31.3 GM/DL — LOW (ref 32–36)
MCHC RBC-ENTMCNC: 31.4 GM/DL — LOW (ref 32–36)
MCHC RBC-ENTMCNC: 31.9 GM/DL — LOW (ref 32–36)
MCHC RBC-ENTMCNC: 32.1 GM/DL — SIGNIFICANT CHANGE UP (ref 32–36)
MCV RBC AUTO: 96 FL — SIGNIFICANT CHANGE UP (ref 80–100)
MCV RBC AUTO: 97.6 FL — SIGNIFICANT CHANGE UP (ref 80–100)
MCV RBC AUTO: 98 FL — SIGNIFICANT CHANGE UP (ref 80–100)
MCV RBC AUTO: 98.1 FL — SIGNIFICANT CHANGE UP (ref 80–100)
NRBC # BLD: 0 /100 WBCS — SIGNIFICANT CHANGE UP (ref 0–0)
PHOSPHATE SERPL-MCNC: 3.9 MG/DL — SIGNIFICANT CHANGE UP (ref 2.5–4.5)
PLATELET # BLD AUTO: 101 K/UL — LOW (ref 150–400)
PLATELET # BLD AUTO: 117 K/UL — LOW (ref 150–400)
PLATELET # BLD AUTO: 119 K/UL — LOW (ref 150–400)
PLATELET # BLD AUTO: 120 K/UL — LOW (ref 150–400)
POTASSIUM SERPL-MCNC: 4.3 MMOL/L — SIGNIFICANT CHANGE UP (ref 3.5–5.3)
POTASSIUM SERPL-SCNC: 4.3 MMOL/L — SIGNIFICANT CHANGE UP (ref 3.5–5.3)
RBC # BLD: 2.47 M/UL — LOW (ref 4.2–5.8)
RBC # BLD: 2.48 M/UL — LOW (ref 4.2–5.8)
RBC # BLD: 2.54 M/UL — LOW (ref 4.2–5.8)
RBC # BLD: 2.66 M/UL — LOW (ref 4.2–5.8)
RBC # FLD: 14 % — SIGNIFICANT CHANGE UP (ref 10.3–14.5)
RBC # FLD: 14.1 % — SIGNIFICANT CHANGE UP (ref 10.3–14.5)
RBC # FLD: 14.6 % — HIGH (ref 10.3–14.5)
RBC # FLD: 14.6 % — HIGH (ref 10.3–14.5)
SODIUM SERPL-SCNC: 143 MMOL/L — SIGNIFICANT CHANGE UP (ref 135–145)
WBC # BLD: 11.01 K/UL — HIGH (ref 3.8–10.5)
WBC # BLD: 14.7 K/UL — HIGH (ref 3.8–10.5)
WBC # BLD: 18.37 K/UL — HIGH (ref 3.8–10.5)
WBC # BLD: 9.38 K/UL — SIGNIFICANT CHANGE UP (ref 3.8–10.5)
WBC # FLD AUTO: 11.01 K/UL — HIGH (ref 3.8–10.5)
WBC # FLD AUTO: 14.7 K/UL — HIGH (ref 3.8–10.5)
WBC # FLD AUTO: 18.37 K/UL — HIGH (ref 3.8–10.5)
WBC # FLD AUTO: 9.38 K/UL — SIGNIFICANT CHANGE UP (ref 3.8–10.5)

## 2021-01-26 PROCEDURE — 99231 SBSQ HOSP IP/OBS SF/LOW 25: CPT

## 2021-01-26 PROCEDURE — 99233 SBSQ HOSP IP/OBS HIGH 50: CPT | Mod: GC

## 2021-01-26 RX ORDER — SODIUM CHLORIDE 9 MG/ML
1000 INJECTION, SOLUTION INTRAVENOUS
Refills: 0 | Status: DISCONTINUED | OUTPATIENT
Start: 2021-01-26 | End: 2021-01-27

## 2021-01-26 RX ADMIN — PANTOPRAZOLE SODIUM 40 MILLIGRAM(S): 20 TABLET, DELAYED RELEASE ORAL at 19:28

## 2021-01-26 RX ADMIN — SODIUM CHLORIDE 70 MILLILITER(S): 9 INJECTION, SOLUTION INTRAVENOUS at 19:27

## 2021-01-26 RX ADMIN — SODIUM CHLORIDE 70 MILLILITER(S): 9 INJECTION, SOLUTION INTRAVENOUS at 06:51

## 2021-01-26 RX ADMIN — Medication 1 DROP(S): at 12:27

## 2021-01-26 NOTE — PROGRESS NOTE ADULT - ASSESSMENT
85 yo M w/ PMH of HTN, COPD, GERALD (on CPAP) Diverticulosis, Hemorrhoids s/p hemorrhoidectomy (2018), Glaucoma, Aortic Root Aneurysm, L Inguinal hernia p/w blood per rectum. Afebrile, HD stable,  WBC 13.12 H/H 11.3/36.0, K 6.1, Cr 1.09. Presentation due to diverticulosis vs hemorrhoids. Now s/p Colonoscopy (1/25).    Primary care per SICU team 85 yo M w/ PMH of HTN, COPD, GERALD (on CPAP) Diverticulosis, Hemorrhoids s/p hemorrhoidectomy (2018), Glaucoma, Aortic Root Aneurysm, L Inguinal hernia p/w blood per rectum. Afebrile, HD stable,  WBC 13.12 H/H 11.3/36.0, K 6.1, Cr 1.09. Presentation due to diverticulosis vs hemorrhoids. Now s/p Colonoscopy (1/25).      CBC @ 12 p.m.; if stable BCLD  Primary care per SICU team

## 2021-01-26 NOTE — PROGRESS NOTE ADULT - ASSESSMENT
87 y/o M pmh HTN, COPD, GERALD (on CPAP) Diverticulosis, Hemorrhoids s/p hemorrhoidectomy (2018), Glaucoma, Aortic Root Aneurysm, L Inguinal hernia p/w blood per rectum.    Diverticular bleed- now resolved. He responded appropriately to prbc transfusion, with no further evidence of hematochezia  colonoscopy performed yesterday showed old heme, with evidence of diverticular disease of left and right colon, but no active bleeding      Please notify GI with any further questions. Thank you      87 y/o M pmh HTN, COPD, GERALD (on CPAP) Diverticulosis, Hemorrhoids s/p hemorrhoidectomy (2018), Glaucoma, Aortic Root Aneurysm, L Inguinal hernia p/w blood per rectum.    Diverticular bleed- now resolved. He responded appropriately to prbc transfusion, with no further evidence of hematochezia  colonoscopy performed yesterday showed old heme, with evidence of diverticular disease of left and right colon, but no active bleeding  recommend high fiber diet      Please notify GI with any further questions. Thank you

## 2021-01-26 NOTE — PROGRESS NOTE ADULT - SUBJECTIVE AND OBJECTIVE BOX
Pt seen and examined at bedside.  Patient denies bloody BM since procedure    PERTINENT REVIEW OF SYSTEMS:  CONSTITUTIONAL: No weakness, fevers or chills  HEENT: No visual changes; No vertigo or throat pain   GASTROINTESTINAL: No abdominal or epigastric pain. No nausea, vomiting, or hematemesis; No diarrhea or constipation. No melena or hematochezia.  NEUROLOGICAL: No numbness or weakness  SKIN: No itching, burning, rashes, or lesions     Allergies    No Known Allergies    Intolerances      MEDICATIONS:  MEDICATIONS  (STANDING):  dextrose 40% Gel 15 Gram(s) Oral once  dextrose 5%. 1000 milliLiter(s) (50 mL/Hr) IV Continuous <Continuous>  dextrose 5%. 1000 milliLiter(s) (100 mL/Hr) IV Continuous <Continuous>  dextrose 50% Injectable 25 Gram(s) IV Push once  dextrose 50% Injectable 12.5 Gram(s) IV Push once  dextrose 50% Injectable 25 Gram(s) IV Push once  glucagon  Injectable 1 milliGRAM(s) IntraMuscular once  insulin lispro (ADMELOG) corrective regimen sliding scale   SubCutaneous every 6 hours  lactated ringers. 1000 milliLiter(s) (70 mL/Hr) IV Continuous <Continuous>  pantoprazole  Injectable 40 milliGRAM(s) IV Push two times a day  timolol 0.5% Solution 1 Drop(s) Both EYES daily    MEDICATIONS  (PRN):    Vital Signs Last 24 Hrs  T(C): 36.8 (26 Jan 2021 14:32), Max: 36.8 (25 Jan 2021 21:00)  T(F): 98.2 (26 Jan 2021 14:32), Max: 98.2 (25 Jan 2021 21:00)  HR: 84 (26 Jan 2021 14:00) (81 - 110)  BP: 110/55 (26 Jan 2021 14:00) (107/58 - 162/70)  BP(mean): 79 (26 Jan 2021 14:00) (77 - 103)  RR: 19 (26 Jan 2021 14:00) (16 - 20)  SpO2: 97% (26 Jan 2021 14:00) (95% - 100%)    01-25 @ 07:01  -  01-26 @ 07:00  --------------------------------------------------------  IN: 1990 mL / OUT: 1100 mL / NET: 890 mL    01-26 @ 07:01 - 01-26 @ 16:27  --------------------------------------------------------  IN: 420 mL / OUT: 300 mL / NET: 120 mL      PHYSICAL EXAM:    General: Well developed; well nourished; in no acute distress  HEENT: MMM, conjunctiva and sclera clear  Gastrointestinal: Soft non-tender non-distended; Normal bowel sounds; No hepatosplenomegaly. No rebound or guarding  Skin: Warm and dry. No obvious rash    LABS:                        7.6    14.70 )-----------( 120      ( 26 Jan 2021 12:29 )             24.3     01-26    143  |  111<H>  |  14  ----------------------------<  130<H>  4.3   |  25  |  0.79    Ca    8.0<L>      26 Jan 2021 05:16  Phos  3.9     01-26  Mg     2.0     01-26    TPro  6.5  /  Alb  3.8  /  TBili  0.4  /  DBili  x   /  AST  25  /  ALT  22  /  AlkPhos  60  01-24    PT/INR - ( 25 Jan 2021 07:51 )   PT: 12.8 sec;   INR: 1.07          PTT - ( 25 Jan 2021 07:51 )  PTT:25.7 sec                  RADIOLOGY & ADDITIONAL STUDIES:

## 2021-01-26 NOTE — PROGRESS NOTE ADULT - SUBJECTIVE AND OBJECTIVE BOX
SUBJECTIVE: Patient examined at bedside for morning rounds. Overnight the patient was transfused 1 unit pRBCs for hemoglobin of 6.8, with appropriate increase to 7.9. Did not have any bowel movements overnight. States he was able to sleep well. Denies pain, lightheadedness, dizziness, SOB, or chest pain. Has been OOB to wash, feels well.     Home Medications:  amLODIPine: 5 milligram(s) orally once a day (25 Jan 2021 01:13)  aspirin 81 mg oral tablet: orally once a day (25 Jan 2021 01:15)  latanoprost 0.005% ophthalmic solution: 1 drop(s) to each affected eye once a day (in the evening) (25 Jan 2021 01:14)  metoprolol: 12.5 milligram(s) orally 2 times a day (25 Jan 2021 01:13)  pilocarpine nitrate 4% ophthalmic solution:  (25 Jan 2021 01:14)  timolol hemihydrate 0.5% ophthalmic solution: 1 drop(s) to each affected eye 2 times a day (25 Jan 2021 01:14)    MEDICATIONS  (STANDING):  dextrose 40% Gel 15 Gram(s) Oral once  dextrose 5%. 1000 milliLiter(s) (50 mL/Hr) IV Continuous <Continuous>  dextrose 5%. 1000 milliLiter(s) (100 mL/Hr) IV Continuous <Continuous>  dextrose 50% Injectable 25 Gram(s) IV Push once  dextrose 50% Injectable 12.5 Gram(s) IV Push once  dextrose 50% Injectable 25 Gram(s) IV Push once  glucagon  Injectable 1 milliGRAM(s) IntraMuscular once  insulin lispro (ADMELOG) corrective regimen sliding scale   SubCutaneous every 6 hours  lactated ringers. 1000 milliLiter(s) (70 mL/Hr) IV Continuous <Continuous>  pantoprazole  Injectable 40 milliGRAM(s) IV Push two times a day  timolol 0.5% Solution 1 Drop(s) Both EYES daily      Vital Signs Last 24 Hrs  ICU Vital Signs Last 24 Hrs  T(C): 36.6 (26 Jan 2021 05:46), Max: 36.9 (25 Jan 2021 09:00)  T(F): 97.8 (26 Jan 2021 05:46), Max: 98.4 (25 Jan 2021 09:00)  HR: 92 (26 Jan 2021 07:00) (81 - 112)  BP: 143/65 (26 Jan 2021 07:00) (107/58 - 148/67)  BP(mean): 97 (26 Jan 2021 07:00) (75 - 104)  ABP: --  ABP(mean): --  RR: 20 (26 Jan 2021 07:00) (16 - 20)  SpO2: 100% (26 Jan 2021 07:00) (95% - 100%)      Physical Exam:  General: NAD, resting comfortably in the bed  Pulmonary: Nonlabored breathing, no respiratory distress  Abdominal: soft, NT/ND, no rebound tenderness, guarding or rigidity  Extremities: WWP, normal strength, no clubbing/cyanosis/edema, SCDs in place bilaterally   Neuro: AAOx3        I&O's Detail    25 Jan 2021 07:01  -  26 Jan 2021 07:00  --------------------------------------------------------  IN:    Lactated Ringers: 70 mL    PRBCs (Packed Red Blood Cells): 350 mL    sodium chloride 0.9%: 1320 mL    Sodium Chloride 0.9% Bolus: 250 mL  Total IN: 1990 mL    OUT:    Voided (mL): 1100 mL  Total OUT: 1100 mL    Total NET: 890 mL      26 Jan 2021 07:01  -  26 Jan 2021 07:05  --------------------------------------------------------  IN:    Lactated Ringers: 70 mL  Total IN: 70 mL    OUT:  Total OUT: 0 mL    Total NET: 70 mL      LABS:                                     7.6    11.01 )-----------( 101      ( 26 Jan 2021 05:16 )             23.7     01-26    143  |  111<H>  |  14  ----------------------------<  130<H>  4.3   |  25  |  0.79    Ca    8.0<L>      26 Jan 2021 05:16  Phos  3.9     01-26  Mg     2.0     01-26    TPro  6.5  /  Alb  3.8  /  TBili  0.4  /  DBili  x   /  AST  25  /  ALT  22  /  AlkPhos  60  01-24   SUBJECTIVE: Patient examined at bedside for morning rounds. Overnight the patient was transfused 1 unit pRBCs for hemoglobin of 6.8, with appropriate increase to 7.9. Did not have any bowel movements overnight. States he was able to sleep well. Denies pain, lightheadedness, dizziness, SOB, or chest pain. Has been OOB to wash, feels well.     Home Medications:  amLODIPine: 5 milligram(s) orally once a day (25 Jan 2021 01:13)  aspirin 81 mg oral tablet: orally once a day (25 Jan 2021 01:15)  latanoprost 0.005% ophthalmic solution: 1 drop(s) to each affected eye once a day (in the evening) (25 Jan 2021 01:14)  metoprolol: 12.5 milligram(s) orally 2 times a day (25 Jan 2021 01:13)  pilocarpine nitrate 4% ophthalmic solution:  (25 Jan 2021 01:14)  timolol hemihydrate 0.5% ophthalmic solution: 1 drop(s) to each affected eye 2 times a day (25 Jan 2021 01:14)    MEDICATIONS  (STANDING):  dextrose 40% Gel 15 Gram(s) Oral once  dextrose 5%. 1000 milliLiter(s) (50 mL/Hr) IV Continuous <Continuous>  dextrose 5%. 1000 milliLiter(s) (100 mL/Hr) IV Continuous <Continuous>  dextrose 50% Injectable 25 Gram(s) IV Push once  dextrose 50% Injectable 12.5 Gram(s) IV Push once  dextrose 50% Injectable 25 Gram(s) IV Push once  glucagon  Injectable 1 milliGRAM(s) IntraMuscular once  insulin lispro (ADMELOG) corrective regimen sliding scale   SubCutaneous every 6 hours  lactated ringers. 1000 milliLiter(s) (70 mL/Hr) IV Continuous <Continuous>  pantoprazole  Injectable 40 milliGRAM(s) IV Push two times a day  timolol 0.5% Solution 1 Drop(s) Both EYES daily      Vital Signs Last 24 Hrs  ICU Vital Signs Last 24 Hrs  T(C): 36.6 (26 Jan 2021 05:46), Max: 36.9 (25 Jan 2021 09:00)  T(F): 97.8 (26 Jan 2021 05:46), Max: 98.4 (25 Jan 2021 09:00)  HR: 92 (26 Jan 2021 07:00) (81 - 112)  BP: 143/65 (26 Jan 2021 07:00) (107/58 - 148/67)  BP(mean): 97 (26 Jan 2021 07:00) (75 - 104)  ABP: --  ABP(mean): --  RR: 20 (26 Jan 2021 07:00) (16 - 20)  SpO2: 100% (26 Jan 2021 07:00) (95% - 100%)      Physical Exam:  General: NAD, resting comfortably in the bed  HEENT: PERRL, EOMI, MMM  Pulmonary: lungs clear  Heart: RRR S1S2  Abdominal: soft, NT/ND, no rebound tenderness, guarding or rigidity  Extremities: WWP, normal strength, no clubbing/cyanosis/edema, SCDs in place bilaterally   Vasc: 2+ peripheral pulses  Neuro: AAOx3, moves all extremities  Psych: affect appropriate        I&O's Detail    25 Jan 2021 07:01  -  26 Jan 2021 07:00  --------------------------------------------------------  IN:    Lactated Ringers: 70 mL    PRBCs (Packed Red Blood Cells): 350 mL    sodium chloride 0.9%: 1320 mL    Sodium Chloride 0.9% Bolus: 250 mL  Total IN: 1990 mL    OUT:    Voided (mL): 1100 mL  Total OUT: 1100 mL    Total NET: 890 mL      26 Jan 2021 07:01  -  26 Jan 2021 07:05  --------------------------------------------------------  IN:    Lactated Ringers: 70 mL  Total IN: 70 mL    OUT:  Total OUT: 0 mL    Total NET: 70 mL      LABS:                                     7.6    11.01 )-----------( 101      ( 26 Jan 2021 05:16 )             23.7     01-26    143  |  111<H>  |  14  ----------------------------<  130<H>  4.3   |  25  |  0.79    Ca    8.0<L>      26 Jan 2021 05:16  Phos  3.9     01-26  Mg     2.0     01-26    TPro  6.5  /  Alb  3.8  /  TBili  0.4  /  DBili  x   /  AST  25  /  ALT  22  /  AlkPhos  60  01-24

## 2021-01-26 NOTE — PROGRESS NOTE ADULT - ASSESSMENT
87 yo M w/ PMH of HTN, COPD, GERALD (on CPAP) Diverticulosis, Hemorrhoids s/p hemorrhoidectomy (2018), Glaucoma, Aortic Root Aneurysm, L Inguinal hernia admitted for 1 episode of bright red blood per rectum with 5 episodes of dark red stools and downtrending hemoglobin. s/p colonoscopy showing presence of diverticulosis without active bleeding. Afebrile, HD stable, now s/p 1 unit pRBCs with appropriate response.     Pain/Nausea controlled  CV: VSS  Resp: Home CPAP, comfortable on RA  GI: NPO/LR @ 70cc/hr, Protonix. If another episode of bright red blood per rectum with hemodynamic instability, CTA  Heme: s/p 1 unit pRBCs, Hb now stable at 7.6. Q6hr CBCs, next at noon  : Voiding spontaneously  Endo: ISS  ID: Afebrile, no signs of infection  Activity: OOB as tolerated with assistance   PPX: SCDs in place, holding anticoagulation for now     85 yo M w/ PMH of HTN, COPD, GERALD (on CPAP) Diverticulosis, Hemorrhoids s/p hemorrhoidectomy (2018), Glaucoma, Aortic Root Aneurysm, L Inguinal hernia admitted for 1 episode of bright red blood per rectum with 5 episodes of dark red stools and downtrending hemoglobin with acute blood loss anemia. s/p colonoscopy showing presence of diverticulosis without active bleeding. Afebrile, HD stable, now s/p 1 unit pRBCs with appropriate response.     Pain/Nausea controlled  CV: VSS  Resp: Home CPAP, comfortable on RA  GI: NPO/LR @ 70cc/hr, Protonix. If another episode of bright red blood per rectum with hemodynamic instability, CTA  Heme: s/p 1 unit pRBCs, Hb now stable at 7.6. Q6hr CBCs, next at noon  : Voiding spontaneously  Endo: ISS  ID: Afebrile, no signs of infection  Activity: OOB as tolerated with assistance   PPX: SCDs in place, holding anticoagulation for now

## 2021-01-26 NOTE — PROGRESS NOTE ADULT - SUBJECTIVE AND OBJECTIVE BOX
POD: 1  Procedure: Colonoscopy    SUBJECTIVE: Overnight there were no significant events, this morning the patient is feeling well. Yesterday he underwent colonoscopy which did not demonstrate any evidence of active bleeding but some residual clots. His Hgb 6.8 @ 6 p.m. was 6.7 and he received 1 unit of pRBCs, Hgb went up to 7.9. He did not have any more bloody bowel movements overnight. The patient was examined at the bedside by the chief resident. He denies having chest pain, SOB, dizziness, lightheadedness, nausea or vomiting.      Vital Signs Last 24 Hrs  T(C): 36.6 (26 Jan 2021 05:46), Max: 36.9 (25 Jan 2021 09:00)  T(F): 97.8 (26 Jan 2021 05:46), Max: 98.4 (25 Jan 2021 09:00)  HR: 92 (26 Jan 2021 07:00) (81 - 112)  BP: 143/65 (26 Jan 2021 07:00) (107/58 - 148/67)  BP(mean): 97 (26 Jan 2021 07:00) (75 - 104)  RR: 20 (26 Jan 2021 07:00) (16 - 20)  SpO2: 100% (26 Jan 2021 07:00) (95% - 100%)    Physical Exam:  General: NAD, resting comfortably in the bed  Pulmonary: Nonlabored breathing, no respiratory distress  Abdominal: soft, NT/ND, no rebound tenderness, guarding, rigidity  Extremities: WWP, normal strength, no clubbing/cyanosis/edema  Neuro: AAOx3    Lines/drains/tubes:    I&O's Summary    25 Jan 2021 07:01  -  26 Jan 2021 07:00  --------------------------------------------------------  IN: 1990 mL / OUT: 1100 mL / NET: 890 mL    26 Jan 2021 07:01  -  26 Jan 2021 07:31  --------------------------------------------------------  IN: 70 mL / OUT: 0 mL / NET: 70 mL        LABS:                        7.6    11.01 )-----------( 101      ( 26 Jan 2021 05:16 )             23.7     01-26    143  |  111<H>  |  14  ----------------------------<  130<H>  4.3   |  25  |  0.79    Ca    8.0<L>      26 Jan 2021 05:16  Phos  3.9     01-26  Mg     2.0     01-26    TPro  6.5  /  Alb  3.8  /  TBili  0.4  /  DBili  x   /  AST  25  /  ALT  22  /  AlkPhos  60  01-24    PT/INR - ( 25 Jan 2021 07:51 )   PT: 12.8 sec;   INR: 1.07          PTT - ( 25 Jan 2021 07:51 )  PTT:25.7 sec    CAPILLARY BLOOD GLUCOSE      POCT Blood Glucose.: 142 mg/dL (26 Jan 2021 06:58)  POCT Blood Glucose.: 129 mg/dL (25 Jan 2021 23:23)  POCT Blood Glucose.: 123 mg/dL (25 Jan 2021 18:28)    LIVER FUNCTIONS - ( 24 Jan 2021 23:09 )  Alb: 3.8 g/dL / Pro: 6.5 g/dL / ALK PHOS: 60 U/L / ALT: 22 U/L / AST: 25 U/L / GGT: x             RADIOLOGY & ADDITIONAL STUDIES:

## 2021-01-27 LAB
ANION GAP SERPL CALC-SCNC: 6 MMOL/L — SIGNIFICANT CHANGE UP (ref 5–17)
APTT BLD: 22.5 SEC — LOW (ref 27.5–35.5)
BUN SERPL-MCNC: 12 MG/DL — SIGNIFICANT CHANGE UP (ref 7–23)
CALCIUM SERPL-MCNC: 8.3 MG/DL — LOW (ref 8.4–10.5)
CHLORIDE SERPL-SCNC: 105 MMOL/L — SIGNIFICANT CHANGE UP (ref 96–108)
CO2 SERPL-SCNC: 31 MMOL/L — SIGNIFICANT CHANGE UP (ref 22–31)
CREAT SERPL-MCNC: 0.79 MG/DL — SIGNIFICANT CHANGE UP (ref 0.5–1.3)
GLUCOSE BLDC GLUCOMTR-MCNC: 93 MG/DL — SIGNIFICANT CHANGE UP (ref 70–99)
GLUCOSE BLDC GLUCOMTR-MCNC: 94 MG/DL — SIGNIFICANT CHANGE UP (ref 70–99)
GLUCOSE SERPL-MCNC: 92 MG/DL — SIGNIFICANT CHANGE UP (ref 70–99)
HCT VFR BLD CALC: 20.1 % — CRITICAL LOW (ref 39–50)
HCT VFR BLD CALC: 25.9 % — LOW (ref 39–50)
HCT VFR BLD CALC: 26.1 % — LOW (ref 39–50)
HGB BLD-MCNC: 6.4 G/DL — CRITICAL LOW (ref 13–17)
HGB BLD-MCNC: 8.3 G/DL — LOW (ref 13–17)
HGB BLD-MCNC: 8.4 G/DL — LOW (ref 13–17)
INR BLD: 1.04 — SIGNIFICANT CHANGE UP (ref 0.88–1.16)
MAGNESIUM SERPL-MCNC: 2 MG/DL — SIGNIFICANT CHANGE UP (ref 1.6–2.6)
MCHC RBC-ENTMCNC: 30.1 PG — SIGNIFICANT CHANGE UP (ref 27–34)
MCHC RBC-ENTMCNC: 30.5 PG — SIGNIFICANT CHANGE UP (ref 27–34)
MCHC RBC-ENTMCNC: 30.8 PG — SIGNIFICANT CHANGE UP (ref 27–34)
MCHC RBC-ENTMCNC: 31.8 GM/DL — LOW (ref 32–36)
MCHC RBC-ENTMCNC: 32 GM/DL — SIGNIFICANT CHANGE UP (ref 32–36)
MCHC RBC-ENTMCNC: 32.2 GM/DL — SIGNIFICANT CHANGE UP (ref 32–36)
MCV RBC AUTO: 93.8 FL — SIGNIFICANT CHANGE UP (ref 80–100)
MCV RBC AUTO: 94.9 FL — SIGNIFICANT CHANGE UP (ref 80–100)
MCV RBC AUTO: 96.6 FL — SIGNIFICANT CHANGE UP (ref 80–100)
NRBC # BLD: 0 /100 WBCS — SIGNIFICANT CHANGE UP (ref 0–0)
PHOSPHATE SERPL-MCNC: 2.2 MG/DL — LOW (ref 2.5–4.5)
PLATELET # BLD AUTO: 104 K/UL — LOW (ref 150–400)
PLATELET # BLD AUTO: 111 K/UL — LOW (ref 150–400)
PLATELET # BLD AUTO: 117 K/UL — LOW (ref 150–400)
POTASSIUM SERPL-MCNC: 3.7 MMOL/L — SIGNIFICANT CHANGE UP (ref 3.5–5.3)
POTASSIUM SERPL-SCNC: 3.7 MMOL/L — SIGNIFICANT CHANGE UP (ref 3.5–5.3)
PROTHROM AB SERPL-ACNC: 12.4 SEC — SIGNIFICANT CHANGE UP (ref 10.6–13.6)
RBC # BLD: 2.08 M/UL — LOW (ref 4.2–5.8)
RBC # BLD: 2.75 M/UL — LOW (ref 4.2–5.8)
RBC # BLD: 2.76 M/UL — LOW (ref 4.2–5.8)
RBC # FLD: 14.7 % — HIGH (ref 10.3–14.5)
RBC # FLD: 15.7 % — HIGH (ref 10.3–14.5)
RBC # FLD: 15.8 % — HIGH (ref 10.3–14.5)
SODIUM SERPL-SCNC: 142 MMOL/L — SIGNIFICANT CHANGE UP (ref 135–145)
WBC # BLD: 11.02 K/UL — HIGH (ref 3.8–10.5)
WBC # BLD: 11.69 K/UL — HIGH (ref 3.8–10.5)
WBC # BLD: 12.68 K/UL — HIGH (ref 3.8–10.5)
WBC # FLD AUTO: 11.02 K/UL — HIGH (ref 3.8–10.5)
WBC # FLD AUTO: 11.69 K/UL — HIGH (ref 3.8–10.5)
WBC # FLD AUTO: 12.68 K/UL — HIGH (ref 3.8–10.5)

## 2021-01-27 PROCEDURE — 74174 CTA ABD&PLVS W/CONTRAST: CPT | Mod: 26

## 2021-01-27 PROCEDURE — 99232 SBSQ HOSP IP/OBS MODERATE 35: CPT

## 2021-01-27 PROCEDURE — 99233 SBSQ HOSP IP/OBS HIGH 50: CPT | Mod: GC

## 2021-01-27 RX ORDER — SODIUM CHLORIDE 9 MG/ML
1000 INJECTION INTRAMUSCULAR; INTRAVENOUS; SUBCUTANEOUS
Refills: 0 | Status: DISCONTINUED | OUTPATIENT
Start: 2021-01-27 | End: 2021-01-29

## 2021-01-27 RX ORDER — SODIUM CHLORIDE 9 MG/ML
1000 INJECTION, SOLUTION INTRAVENOUS
Refills: 0 | Status: DISCONTINUED | OUTPATIENT
Start: 2021-01-27 | End: 2021-01-27

## 2021-01-27 RX ORDER — INSULIN LISPRO 100/ML
VIAL (ML) SUBCUTANEOUS
Refills: 0 | Status: DISCONTINUED | OUTPATIENT
Start: 2021-01-27 | End: 2021-01-30

## 2021-01-27 RX ORDER — SODIUM CHLORIDE 9 MG/ML
1000 INJECTION, SOLUTION INTRAVENOUS
Refills: 0 | Status: DISCONTINUED | OUTPATIENT
Start: 2021-01-27 | End: 2021-01-30

## 2021-01-27 RX ORDER — DEXTROSE 50 % IN WATER 50 %
25 SYRINGE (ML) INTRAVENOUS ONCE
Refills: 0 | Status: DISCONTINUED | OUTPATIENT
Start: 2021-01-27 | End: 2021-01-30

## 2021-01-27 RX ORDER — SODIUM CHLORIDE 9 MG/ML
1000 INJECTION, SOLUTION INTRAVENOUS ONCE
Refills: 0 | Status: DISCONTINUED | OUTPATIENT
Start: 2021-01-27 | End: 2021-01-27

## 2021-01-27 RX ORDER — DEXTROSE 50 % IN WATER 50 %
15 SYRINGE (ML) INTRAVENOUS ONCE
Refills: 0 | Status: DISCONTINUED | OUTPATIENT
Start: 2021-01-27 | End: 2021-01-30

## 2021-01-27 RX ORDER — DEXTROSE 50 % IN WATER 50 %
12.5 SYRINGE (ML) INTRAVENOUS ONCE
Refills: 0 | Status: DISCONTINUED | OUTPATIENT
Start: 2021-01-27 | End: 2021-01-30

## 2021-01-27 RX ORDER — GLUCAGON INJECTION, SOLUTION 0.5 MG/.1ML
1 INJECTION, SOLUTION SUBCUTANEOUS ONCE
Refills: 0 | Status: DISCONTINUED | OUTPATIENT
Start: 2021-01-27 | End: 2021-02-02

## 2021-01-27 RX ADMIN — PANTOPRAZOLE SODIUM 40 MILLIGRAM(S): 20 TABLET, DELAYED RELEASE ORAL at 17:03

## 2021-01-27 RX ADMIN — Medication 1 DROP(S): at 10:58

## 2021-01-27 RX ADMIN — SODIUM CHLORIDE 75 MILLILITER(S): 9 INJECTION INTRAMUSCULAR; INTRAVENOUS; SUBCUTANEOUS at 15:34

## 2021-01-27 RX ADMIN — SODIUM CHLORIDE 120 MILLILITER(S): 9 INJECTION, SOLUTION INTRAVENOUS at 12:57

## 2021-01-27 RX ADMIN — PANTOPRAZOLE SODIUM 40 MILLIGRAM(S): 20 TABLET, DELAYED RELEASE ORAL at 06:30

## 2021-01-27 NOTE — PROGRESS NOTE ADULT - ASSESSMENT
87 y/o M pmh HTN, COPD, GERALD (on CPAP) Diverticulosis, Hemorrhoids s/p hemorrhoidectomy (2018), Glaucoma, Aortic Root Aneurysm, L Inguinal hernia p/w blood per rectum.    Diverticular bleed  colonoscopy performed on 1/25 showed old heme, with evidence of diverticular disease of left and right colon, but no active bleeding  Patient rebleed this morning, with drop in hemoglobin. CTA performed this afternoon shows no evidence of active bleed  Upon examination, patient remains hemodynamically stable, with no further evidence of bleeding and with appropriate response to prbc transfusion  It is likely that he has now stopped bleeding. At this time, given that he is not actively bleed, and we know the source of his bleed, will hold off on repeating colonoscopy at this time  If he continues to rebleed, will consider repeating colonoscopy. Given the natural history of diverticular disease, it can often be difficult to cath the diverticula actively bleeding     85 y/o M pmh HTN, COPD, GERALD (on CPAP) Diverticulosis, Hemorrhoids s/p hemorrhoidectomy (2018), Glaucoma, Aortic Root Aneurysm, L Inguinal hernia p/w blood per rectum.    Diverticular bleed  colonoscopy performed on 1/25 showed old heme, with evidence of diverticular disease of left and right colon, but no active bleeding  Patient rebleed this morning, with drop in hemoglobin. CTA performed this afternoon shows no evidence of active bleed  Upon examination, patient remains hemodynamically stable, with no further evidence of bleeding and with appropriate response to prbc transfusion  It is likely that he has now stopped bleeding. At this time, given that he is not actively bleed, and we know the source of his bleed, will hold off on repeating colonoscopy at this time  If he continues to rebleed, will consider repeating colonoscopy. Given the natural history of diverticular disease, it can often be difficult to cath the diverticula actively bleeding  Remain on clear liquid diet today, trend CBC

## 2021-01-27 NOTE — PROGRESS NOTE ADULT - ASSESSMENT
87 y/o M pmh HTN, COPD, GERALD (on CPAP) Diverticulosis, Hemorrhoids s/p hemorrhoidectomy (2018), diverticulosis, Glaucoma, Aortic Root Aneurysm, L Inguinal hernia a/w LGIB.     NEURO: no issues  CV: stable  PULM: room air, CPAP at night  GI/FEN: BCLD, LR@70  : voids  ENDO: ISS  ID: no issues  PPX: SCDs, no SQH due to GIB  LINES: PIVs,   WOUNDS/DRAINS: none

## 2021-01-27 NOTE — CONSULT NOTE ADULT - SUBJECTIVE AND OBJECTIVE BOX
85 y/o M pmh HTN, COPD, GERALD (on CPAP) Diverticulosis, Hemorrhoids s/p hemorrhoidectomy (2018), Glaucoma, Aortic Root Aneurysm, L Inguinal hernia p/w blood per rectum. Colonoscopy was negative per GI. Resolved but had recent repeat episode, Hgb 13->11. Ir consult for embo.    Clinical History: HEMATOCHEZIA    No pertinent family history in first degree relatives    Handoff    MEWS Score    COPD (chronic obstructive pulmonary disease)    Sleep apnea    Hernia    Hemorrhoids    Hypertension    Hematochezia    S/P hemorrhoidectomy    No significant past surgical history    ANAL BLEEDING    90+    Diverticulosis    [email or web address]        Meds:lactated ringers. 1000 milliLiter(s) IV Continuous <Continuous>  pantoprazole  Injectable 40 milliGRAM(s) IV Push two times a day  timolol 0.5% Solution 1 Drop(s) Both EYES daily      Allergies:No Known Allergies        Labs:                           6.4    11.69 )-----------( 111      ( 27 Jan 2021 08:33 )             20.1       01-27    142  |  105  |  12  ----------------------------<  92  3.7   |  31  |  0.79    Ca    8.3<L>      27 Jan 2021 08:33  Phos  2.2     01-27  Mg     2.0     01-27            Imaging Findings:  CTA A/P shows no active bleed.        Assessment:  85 y/o M pmh HTN, COPD, GERALD (on CPAP) Diverticulosis, Hemorrhoids s/p hemorrhoidectomy (2018), Glaucoma, Aortic Root Aneurysm, L Inguinal hernia p/w blood per rectum. Colonoscopy was negative per GI. Resolved but had recent repeat episode, Hgb 13->11. Ir consult for embo.    No active bleed on CTA.        Recommendations:  No indication for embolization at this time. Rest of maangement per primary.       Communicated with: team 4 surgery, Dr. Davison

## 2021-01-27 NOTE — PROGRESS NOTE ADULT - ASSESSMENT
85 yo M w/ PMH of HTN, COPD, GERALD (on CPAP) Diverticulosis, Hemorrhoids s/p hemorrhoidectomy (2018), Glaucoma, Aortic Root Aneurysm, L Inguinal hernia admitted for 1 episode of bright red blood per rectum with 5 episodes of dark red stools and downtrending hemoglobin with acute blood loss anemia.   - s/p colonoscopy 1/25 showing presence of diverticulosis without active bleeding. s/p 1U pRBC   - s/p CTA negative for acute GI bleed. hg 6.4 from 7.9 s/p another U pRBC.    Pain/Nausea controlled  HENT: Timolol eye gtt  CV: VSS  Resp: Home CPAP, comfortable on RA  GI: Bariatric clears, NS @ 75cc/hr, IV Protonix BID. If another episode of bright red blood per rectum, will immediately call GI for colonoscopy or IR for embolization.  Heme: s/p 2nd unit pRBC, Hb now stable at 8.3. Q6hr CBCs, next at 8pm  : Voiding spontaneously  Endo: ISS  ID: Afebrile, no signs of infection  Activity: OOB as tolerated with assistance   PPX: SCDs in place, holding anticoagulation for now   85 yo M w/ PMH of HTN, COPD, GERALD (on CPAP) Diverticulosis, Hemorrhoids s/p hemorrhoidectomy (2018), Glaucoma, Aortic Root Aneurysm, L Inguinal hernia admitted for 1 episode of bright red blood per rectum with 5 episodes of dark red stools and downtrending hemoglobin with acute blood loss anemia.   - s/p colonoscopy 1/25 showing presence of diverticulosis without active bleeding. s/p 1U pRBC   - hg 6.4 from 7.9. s/p CTA 1/27 negative for acute GI bleed.     Pain/Nausea controlled  HENT: Timolol eye gtt  CV: VSS  Resp: Home CPAP, comfortable on RA  GI: Bariatric clears, NS @ 75cc/hr, IV Protonix BID. If another episode of bright red blood per rectum, will immediately call GI for colonoscopy or IR for embolization.  Heme: s/p 2nd unit pRBC, Hb now stable at 8.3. Q6hr CBCs, next at 8pm  : Voiding spontaneously  Endo: ISS  ID: Afebrile, no signs of infection  Activity: OOB as tolerated with assistance   PPX: SCDs in place, holding anticoagulation for now

## 2021-01-27 NOTE — PROGRESS NOTE ADULT - SUBJECTIVE AND OBJECTIVE BOX
INTERVAL HPI/OVERNIGHT EVENTS: 18:00 hgb 8.2 (7.6), claudia clears, 1 bloody bm, UOP: 1100cc, VSS     STATUS POST:  colonoscopy    POST OPERATIVE DAY #: 2    SUBJECTIVE:  pt seen at bedside, did not get a good nights sleep last night, x1 BM this morning with blood, denies dysuria, dyspnea    MEDICATIONS  (STANDING):  lactated ringers. 1000 milliLiter(s) (70 mL/Hr) IV Continuous <Continuous>  pantoprazole  Injectable 40 milliGRAM(s) IV Push two times a day  timolol 0.5% Solution 1 Drop(s) Both EYES daily    MEDICATIONS  (PRN):      Vital Signs Last 24 Hrs  T(C): 36.7 (26 Jan 2021 23:20), Max: 37.1 (26 Jan 2021 19:05)  T(F): 98.1 (26 Jan 2021 23:20), Max: 98.7 (26 Jan 2021 19:05)  HR: 97 (27 Jan 2021 06:00) (79 - 110)  BP: 133/60 (27 Jan 2021 06:00) (110/55 - 162/70)  BP(mean): 79 (26 Jan 2021 14:00) (79 - 103)  RR: 18 (27 Jan 2021 06:00) (17 - 20)  SpO2: 97% (27 Jan 2021 06:00) (96% - 100%)    PHYSICAL EXAM:      Constitutional: A&Ox3    Respiratory: non labored breathing, no respiratory distress    Cardiovascular: NSR, RRR    Gastrointestinal: soft, nontender, nondistended    Extremities: (-) edema                  I&O's Detail    26 Jan 2021 07:01  -  27 Jan 2021 07:00  --------------------------------------------------------  IN:    Lactated Ringers: 1330 mL  Total IN: 1330 mL    OUT:    Voided (mL): 3400 mL  Total OUT: 3400 mL    Total NET: -2070 mL          LABS:                        8.2    18.37 )-----------( 119      ( 26 Jan 2021 18:50 )             26.1     01-26    143  |  111<H>  |  14  ----------------------------<  130<H>  4.3   |  25  |  0.79    Ca    8.0<L>      26 Jan 2021 05:16  Phos  3.9     01-26  Mg     2.0     01-26      PT/INR - ( 25 Jan 2021 07:51 )   PT: 12.8 sec;   INR: 1.07          PTT - ( 25 Jan 2021 07:51 )  PTT:25.7 sec      RADIOLOGY & ADDITIONAL STUDIES:

## 2021-01-27 NOTE — PROGRESS NOTE ADULT - SUBJECTIVE AND OBJECTIVE BOX
Pt seen and examined at bedside.  Patient reports two bloody BMs at 11 pm last night and 6 am this morning  Denies abd pain, nausea, vomiting, hematemesis    PERTINENT REVIEW OF SYSTEMS:  CONSTITUTIONAL: No weakness, fevers or chills  HEENT: No visual changes; No vertigo or throat pain   GASTROINTESTINAL: No abdominal or epigastric pain. No nausea, vomiting, or hematemesis; No diarrhea or constipation. No melena or hematochezia.  NEUROLOGICAL: No numbness or weakness  SKIN: No itching, burning, rashes, or lesions     Allergies    No Known Allergies    Intolerances      MEDICATIONS:  MEDICATIONS  (STANDING):  dextrose 40% Gel 15 Gram(s) Oral once  dextrose 5%. 1000 milliLiter(s) (50 mL/Hr) IV Continuous <Continuous>  dextrose 5%. 1000 milliLiter(s) (100 mL/Hr) IV Continuous <Continuous>  dextrose 50% Injectable 25 Gram(s) IV Push once  dextrose 50% Injectable 12.5 Gram(s) IV Push once  dextrose 50% Injectable 25 Gram(s) IV Push once  glucagon  Injectable 1 milliGRAM(s) IntraMuscular once  insulin lispro (ADMELOG) corrective regimen sliding scale   SubCutaneous Before meals and at bedtime  lactated ringers. 1000 milliLiter(s) (120 mL/Hr) IV Continuous <Continuous>  pantoprazole  Injectable 40 milliGRAM(s) IV Push two times a day  timolol 0.5% Solution 1 Drop(s) Both EYES daily    MEDICATIONS  (PRN):    Vital Signs Last 24 Hrs  T(C): 37.1 (27 Jan 2021 13:15), Max: 37.2 (27 Jan 2021 12:29)  T(F): 98.7 (27 Jan 2021 13:15), Max: 99 (27 Jan 2021 12:29)  HR: 72 (27 Jan 2021 14:00) (70 - 109)  BP: 150/65 (27 Jan 2021 14:00) (112/60 - 150/65)  BP(mean): 94 (27 Jan 2021 14:00) (79 - 94)  RR: 17 (27 Jan 2021 14:00) (13 - 22)  SpO2: 99% (27 Jan 2021 14:00) (95% - 100%)    01-26 @ 07:01  -  01-27 @ 07:00  --------------------------------------------------------  IN: 1330 mL / OUT: 3400 mL / NET: -2070 mL    01-27 @ 07:01  - 01-27 @ 14:35  --------------------------------------------------------  IN: 250 mL / OUT: 800 mL / NET: -550 mL      PHYSICAL EXAM:    General: Well developed; well nourished; in no acute distress  HEENT: MMM, conjunctiva and sclera clear  Gastrointestinal: Soft non-tender non-distended; Normal bowel sounds; No hepatosplenomegaly. No rebound or guarding  Skin: Warm and dry. No obvious rash    LABS:                        8.3    12.68 )-----------( 104      ( 27 Jan 2021 14:09 )             25.9     01-27    142  |  105  |  12  ----------------------------<  92  3.7   |  31  |  0.79    Ca    8.3<L>      27 Jan 2021 08:33  Phos  2.2     01-27  Mg     2.0     01-27                        RADIOLOGY & ADDITIONAL STUDIES:

## 2021-01-27 NOTE — PROGRESS NOTE ADULT - SUBJECTIVE AND OBJECTIVE BOX
INTERVAL/OVERNIGHT EVENTS:    1/25: Colonoscopy negative for acute GI bleed, dark blood clots found. Pt monitored in SICU and resuscitated appropriately w 1 U pRBC  1/26: Transfer to floor with stable Hg, 1 dark BM, and tolerating CLD.   1/27: Sent back to SICU today after pt had 2 episodes of bright red bloody BM and Hg downtrend to 6.4 from 7.9. CTA abd/pelvis ordered.    SUBJECTIVE: ***    Neurologic Medications    Respiratory Medications    Cardiovascular Medications    Gastrointestinal Medications  dextrose 5%. 1000 milliLiter(s) IV Continuous <Continuous>  dextrose 5%. 1000 milliLiter(s) IV Continuous <Continuous>  pantoprazole  Injectable 40 milliGRAM(s) IV Push two times a day  sodium chloride 0.9%. 1000 milliLiter(s) IV Continuous <Continuous>    Genitourinary Medications    Hematologic/Oncologic Medications    Antimicrobial/Immunologic Medications    Endocrine/Metabolic Medications  dextrose 40% Gel 15 Gram(s) Oral once  dextrose 50% Injectable 25 Gram(s) IV Push once  dextrose 50% Injectable 12.5 Gram(s) IV Push once  dextrose 50% Injectable 25 Gram(s) IV Push once  glucagon  Injectable 1 milliGRAM(s) IntraMuscular once  insulin lispro (ADMELOG) corrective regimen sliding scale   SubCutaneous Before meals and at bedtime    Topical/Other Medications  timolol 0.5% Solution 1 Drop(s) Both EYES daily    MEDICATIONS  (PRN):    I&O's Detail    26 Jan 2021 07:01  -  27 Jan 2021 07:00  --------------------------------------------------------  IN:    Lactated Ringers: 1330 mL  Total IN: 1330 mL    OUT:    Voided (mL): 3400 mL  Total OUT: 3400 mL    Total NET: -2070 mL    27 Jan 2021 07:01  -  27 Jan 2021 16:14  --------------------------------------------------------  IN:    Lactated Ringers: 250 mL  Total IN: 250 mL    OUT:    Voided (mL): 800 mL  Total OUT: 800 mL    Total NET: -550 mL    Vital Signs Last 24 Hrs  T(C): 37.1 (27 Jan 2021 13:15), Max: 37.2 (27 Jan 2021 12:29)  T(F): 98.7 (27 Jan 2021 13:15), Max: 99 (27 Jan 2021 12:29)  HR: 64 (27 Jan 2021 15:00) (64 - 109)  BP: 120/58 (27 Jan 2021 15:00) (112/60 - 150/65)  BP(mean): 84 (27 Jan 2021 15:00) (79 - 94)  RR: 14 (27 Jan 2021 15:00) (13 - 22)  SpO2: 97% (27 Jan 2021 15:00) (95% - 100%)    GENERAL: NAD, resting comfortably in bed  HEENT: NCAT, MMM  C/V: Normal rate, normal peripheral perfusion  PULM: Nonlabored breathing, no respiratory distress,   ABD: Soft, ND, NT, no rebound tenderness, no guarding  EXTREM: WWP, no edema, SCDs in place  NEURO: No focal deficits    LABS:                        8.3    12.68 )-----------( 104      ( 27 Jan 2021 14:09 )             25.9     01-27    142  |  105  |  12  ----------------------------<  92  3.7   |  31  |  0.79    Ca    8.3<L>      27 Jan 2021 08:33  Phos  2.2     01-27  Mg     2.0     01-27    RADIOLOGY & ADDITIONAL STUDIES:    CTA Abd/Pelvis 1/27/21: Negative for contrast extravasation/acute GI bleed. INTERVAL/OVERNIGHT EVENTS:    1/25: Colonoscopy negative for acute GI bleed, dark blood clots found. Pt monitored in SICU and resuscitated appropriately w 1 U pRBC  1/26: Transfer to floor with stable Hg, 1 dark BM, and tolerating CLD.   1/27: Sent back to SICU today after pt had 2 episodes of bright red bloody BM and Hg downtrend to 6.4 from 7.9. CTA abd/pelvis ordered.    SUBJECTIVE: Pt states he had a bloody BM around 11AM and 2 PM today associated with some lightheadedness. Reports lightheadedness resolved after receiving blood product. No F/C. Denies abd pain, or N/V. Voiding without issues. Has TANG, but no respiratory issues at rest.     Neurologic Medications    Respiratory Medications    Cardiovascular Medications    Gastrointestinal Medications  dextrose 5%. 1000 milliLiter(s) IV Continuous <Continuous>  dextrose 5%. 1000 milliLiter(s) IV Continuous <Continuous>  pantoprazole  Injectable 40 milliGRAM(s) IV Push two times a day  sodium chloride 0.9%. 1000 milliLiter(s) IV Continuous <Continuous>    Genitourinary Medications    Hematologic/Oncologic Medications    Antimicrobial/Immunologic Medications    Endocrine/Metabolic Medications  dextrose 40% Gel 15 Gram(s) Oral once  dextrose 50% Injectable 25 Gram(s) IV Push once  dextrose 50% Injectable 12.5 Gram(s) IV Push once  dextrose 50% Injectable 25 Gram(s) IV Push once  glucagon  Injectable 1 milliGRAM(s) IntraMuscular once  insulin lispro (ADMELOG) corrective regimen sliding scale   SubCutaneous Before meals and at bedtime    Topical/Other Medications  timolol 0.5% Solution 1 Drop(s) Both EYES daily    MEDICATIONS  (PRN):    I&O's Detail    26 Jan 2021 07:01  -  27 Jan 2021 07:00  --------------------------------------------------------  IN:    Lactated Ringers: 1330 mL  Total IN: 1330 mL    OUT:    Voided (mL): 3400 mL  Total OUT: 3400 mL    Total NET: -2070 mL    27 Jan 2021 07:01  -  27 Jan 2021 16:14  --------------------------------------------------------  IN:    Lactated Ringers: 250 mL  Total IN: 250 mL    OUT:    Voided (mL): 800 mL  Total OUT: 800 mL    Total NET: -550 mL    Vital Signs Last 24 Hrs  T(C): 37.1 (27 Jan 2021 13:15), Max: 37.2 (27 Jan 2021 12:29)  T(F): 98.7 (27 Jan 2021 13:15), Max: 99 (27 Jan 2021 12:29)  HR: 64 (27 Jan 2021 15:00) (64 - 109)  BP: 120/58 (27 Jan 2021 15:00) (112/60 - 150/65)  BP(mean): 84 (27 Jan 2021 15:00) (79 - 94)  RR: 14 (27 Jan 2021 15:00) (13 - 22)  SpO2: 97% (27 Jan 2021 15:00) (95% - 100%)    GENERAL: NAD, resting comfortably in bed  HEENT: NCAT, MMM, no JVD  C/V: RRR, normal peripheral perfusion, +2 radial pulses, +2 PT/DP pulses, extremities warm.  PULM: Nonlabored breathing, no respiratory distress,   ABD: Soft, ND, NT, no rebound tenderness, no guarding, NABS.  EXTREM: WWP, no edema, SCDs in place  NEURO: No focal deficits    LABS:                        8.3    12.68 )-----------( 104      ( 27 Jan 2021 14:09 )             25.9     01-27    142  |  105  |  12  ----------------------------<  92  3.7   |  31  |  0.79    Ca    8.3<L>      27 Jan 2021 08:33  Phos  2.2     01-27  Mg     2.0     01-27    RADIOLOGY & ADDITIONAL STUDIES:    CTA Abd/Pelvis 1/27/21: Negative for contrast extravasation/acute GI bleed. INTERVAL/OVERNIGHT EVENTS:    1/25: Colonoscopy negative for acute GI bleed, dark blood clots found. Pt monitored in SICU and resuscitated appropriately w 1 U pRBC  1/26: Transfer to floor with stable Hg, 1 dark BM, and tolerating CLD.   1/27: Sent back to SICU today after pt had 2 episodes of bright red bloody BM and Hg downtrend to 6.4 from 7.9. CTA abd/pelvis ordered.    SUBJECTIVE: Pt states he had a bloody BM around 11AM and 2 PM today associated with some lightheadedness. Reports lightheadedness resolved after receiving blood product. No F/C. Denies abd pain, or N/V. Voiding without issues. Has TANG, but no respiratory issues at rest.     Neurologic Medications    Respiratory Medications    Cardiovascular Medications    Gastrointestinal Medications  dextrose 5%. 1000 milliLiter(s) IV Continuous <Continuous>  dextrose 5%. 1000 milliLiter(s) IV Continuous <Continuous>  pantoprazole  Injectable 40 milliGRAM(s) IV Push two times a day  sodium chloride 0.9%. 1000 milliLiter(s) IV Continuous <Continuous>    Genitourinary Medications    Hematologic/Oncologic Medications    Antimicrobial/Immunologic Medications    Endocrine/Metabolic Medications  dextrose 40% Gel 15 Gram(s) Oral once  dextrose 50% Injectable 25 Gram(s) IV Push once  dextrose 50% Injectable 12.5 Gram(s) IV Push once  dextrose 50% Injectable 25 Gram(s) IV Push once  glucagon  Injectable 1 milliGRAM(s) IntraMuscular once  insulin lispro (ADMELOG) corrective regimen sliding scale   SubCutaneous Before meals and at bedtime    Topical/Other Medications  timolol 0.5% Solution 1 Drop(s) Both EYES daily    MEDICATIONS  (PRN):    I&O's Detail    26 Jan 2021 07:01  -  27 Jan 2021 07:00  --------------------------------------------------------  IN:    Lactated Ringers: 1330 mL  Total IN: 1330 mL    OUT:    Voided (mL): 3400 mL  Total OUT: 3400 mL    Total NET: -2070 mL    27 Jan 2021 07:01  -  27 Jan 2021 16:14  --------------------------------------------------------  IN:    Lactated Ringers: 250 mL  Total IN: 250 mL    OUT:    Voided (mL): 800 mL  Total OUT: 800 mL    Total NET: -550 mL    Vital Signs Last 24 Hrs  T(C): 37.1 (27 Jan 2021 13:15), Max: 37.2 (27 Jan 2021 12:29)  T(F): 98.7 (27 Jan 2021 13:15), Max: 99 (27 Jan 2021 12:29)  HR: 64 (27 Jan 2021 15:00) (64 - 109)  BP: 120/58 (27 Jan 2021 15:00) (112/60 - 150/65)  BP(mean): 84 (27 Jan 2021 15:00) (79 - 94)  RR: 14 (27 Jan 2021 15:00) (13 - 22)  SpO2: 97% (27 Jan 2021 15:00) (95% - 100%)    GENERAL: NAD, resting comfortably in bed  HEENT: NCAT, MMM, no JVD  C/V: RRR, normal peripheral perfusion,   PULM: Nonlabored breathing, no respiratory distress,   ABD: Soft, ND, NT, no rebound tenderness, no guarding, NABS.  EXTREM: WWP, no edema, SCDs in place  VASC:+2 radial pulses, +2 PT/DP pulses, extremities warm.  NEURO: No focal deficits  PSYCH: affect appropriate    LABS:                        8.3    12.68 )-----------( 104      ( 27 Jan 2021 14:09 )             25.9     01-27    142  |  105  |  12  ----------------------------<  92  3.7   |  31  |  0.79    Ca    8.3<L>      27 Jan 2021 08:33  Phos  2.2     01-27  Mg     2.0     01-27    RADIOLOGY & ADDITIONAL STUDIES:    CTA Abd/Pelvis 1/27/21: Negative for contrast extravasation/acute GI bleed.

## 2021-01-28 LAB
A1C WITH ESTIMATED AVERAGE GLUCOSE RESULT: 5.7 % — HIGH (ref 4–5.6)
ANION GAP SERPL CALC-SCNC: 11 MMOL/L — SIGNIFICANT CHANGE UP (ref 5–17)
BLD GP AB SCN SERPL QL: NEGATIVE — SIGNIFICANT CHANGE UP
BUN SERPL-MCNC: 8 MG/DL — SIGNIFICANT CHANGE UP (ref 7–23)
CALCIUM SERPL-MCNC: 8.2 MG/DL — LOW (ref 8.4–10.5)
CHLORIDE SERPL-SCNC: 105 MMOL/L — SIGNIFICANT CHANGE UP (ref 96–108)
CO2 SERPL-SCNC: 26 MMOL/L — SIGNIFICANT CHANGE UP (ref 22–31)
CREAT SERPL-MCNC: 0.68 MG/DL — SIGNIFICANT CHANGE UP (ref 0.5–1.3)
ESTIMATED AVERAGE GLUCOSE: 117 MG/DL — HIGH (ref 68–114)
GLUCOSE BLDC GLUCOMTR-MCNC: 109 MG/DL — HIGH (ref 70–99)
GLUCOSE BLDC GLUCOMTR-MCNC: 143 MG/DL — HIGH (ref 70–99)
GLUCOSE BLDC GLUCOMTR-MCNC: 75 MG/DL — SIGNIFICANT CHANGE UP (ref 70–99)
GLUCOSE BLDC GLUCOMTR-MCNC: 79 MG/DL — SIGNIFICANT CHANGE UP (ref 70–99)
GLUCOSE BLDC GLUCOMTR-MCNC: 85 MG/DL — SIGNIFICANT CHANGE UP (ref 70–99)
GLUCOSE SERPL-MCNC: 87 MG/DL — SIGNIFICANT CHANGE UP (ref 70–99)
HCT VFR BLD CALC: 23.8 % — LOW (ref 39–50)
HCT VFR BLD CALC: 29.7 % — LOW (ref 39–50)
HCT VFR BLD CALC: 30.7 % — LOW (ref 39–50)
HCT VFR BLD CALC: 32.5 % — LOW (ref 39–50)
HGB BLD-MCNC: 10.2 G/DL — LOW (ref 13–17)
HGB BLD-MCNC: 7.7 G/DL — LOW (ref 13–17)
HGB BLD-MCNC: 9.6 G/DL — LOW (ref 13–17)
HGB BLD-MCNC: 9.9 G/DL — LOW (ref 13–17)
MAGNESIUM SERPL-MCNC: 1.8 MG/DL — SIGNIFICANT CHANGE UP (ref 1.6–2.6)
MCHC RBC-ENTMCNC: 30.3 PG — SIGNIFICANT CHANGE UP (ref 27–34)
MCHC RBC-ENTMCNC: 30.6 PG — SIGNIFICANT CHANGE UP (ref 27–34)
MCHC RBC-ENTMCNC: 31 PG — SIGNIFICANT CHANGE UP (ref 27–34)
MCHC RBC-ENTMCNC: 31.1 PG — SIGNIFICANT CHANGE UP (ref 27–34)
MCHC RBC-ENTMCNC: 31.4 GM/DL — LOW (ref 32–36)
MCHC RBC-ENTMCNC: 32.2 GM/DL — SIGNIFICANT CHANGE UP (ref 32–36)
MCHC RBC-ENTMCNC: 32.3 GM/DL — SIGNIFICANT CHANGE UP (ref 32–36)
MCHC RBC-ENTMCNC: 32.4 GM/DL — SIGNIFICANT CHANGE UP (ref 32–36)
MCV RBC AUTO: 94.4 FL — SIGNIFICANT CHANGE UP (ref 80–100)
MCV RBC AUTO: 96.1 FL — SIGNIFICANT CHANGE UP (ref 80–100)
MCV RBC AUTO: 96.2 FL — SIGNIFICANT CHANGE UP (ref 80–100)
MCV RBC AUTO: 96.4 FL — SIGNIFICANT CHANGE UP (ref 80–100)
NRBC # BLD: 0 /100 WBCS — SIGNIFICANT CHANGE UP (ref 0–0)
PHOSPHATE SERPL-MCNC: 3.2 MG/DL — SIGNIFICANT CHANGE UP (ref 2.5–4.5)
PLATELET # BLD AUTO: 111 K/UL — LOW (ref 150–400)
PLATELET # BLD AUTO: 117 K/UL — LOW (ref 150–400)
PLATELET # BLD AUTO: 123 K/UL — LOW (ref 150–400)
PLATELET # BLD AUTO: 139 K/UL — LOW (ref 150–400)
POTASSIUM SERPL-MCNC: 3.8 MMOL/L — SIGNIFICANT CHANGE UP (ref 3.5–5.3)
POTASSIUM SERPL-SCNC: 3.8 MMOL/L — SIGNIFICANT CHANGE UP (ref 3.5–5.3)
RBC # BLD: 2.52 M/UL — LOW (ref 4.2–5.8)
RBC # BLD: 3.09 M/UL — LOW (ref 4.2–5.8)
RBC # BLD: 3.19 M/UL — LOW (ref 4.2–5.8)
RBC # BLD: 3.37 M/UL — LOW (ref 4.2–5.8)
RBC # FLD: 14.9 % — HIGH (ref 10.3–14.5)
RBC # FLD: 14.9 % — HIGH (ref 10.3–14.5)
RBC # FLD: 15.2 % — HIGH (ref 10.3–14.5)
RBC # FLD: 15.8 % — HIGH (ref 10.3–14.5)
RH IG SCN BLD-IMP: POSITIVE — SIGNIFICANT CHANGE UP
SODIUM SERPL-SCNC: 142 MMOL/L — SIGNIFICANT CHANGE UP (ref 135–145)
WBC # BLD: 10.17 K/UL — SIGNIFICANT CHANGE UP (ref 3.8–10.5)
WBC # BLD: 9.07 K/UL — SIGNIFICANT CHANGE UP (ref 3.8–10.5)
WBC # BLD: 9.43 K/UL — SIGNIFICANT CHANGE UP (ref 3.8–10.5)
WBC # BLD: 9.86 K/UL — SIGNIFICANT CHANGE UP (ref 3.8–10.5)
WBC # FLD AUTO: 10.17 K/UL — SIGNIFICANT CHANGE UP (ref 3.8–10.5)
WBC # FLD AUTO: 9.07 K/UL — SIGNIFICANT CHANGE UP (ref 3.8–10.5)
WBC # FLD AUTO: 9.43 K/UL — SIGNIFICANT CHANGE UP (ref 3.8–10.5)
WBC # FLD AUTO: 9.86 K/UL — SIGNIFICANT CHANGE UP (ref 3.8–10.5)

## 2021-01-28 PROCEDURE — 99232 SBSQ HOSP IP/OBS MODERATE 35: CPT

## 2021-01-28 PROCEDURE — 99233 SBSQ HOSP IP/OBS HIGH 50: CPT | Mod: GC

## 2021-01-28 RX ORDER — MAGNESIUM SULFATE 500 MG/ML
2 VIAL (ML) INJECTION ONCE
Refills: 0 | Status: COMPLETED | OUTPATIENT
Start: 2021-01-28 | End: 2021-01-28

## 2021-01-28 RX ORDER — POTASSIUM CHLORIDE 20 MEQ
10 PACKET (EA) ORAL ONCE
Refills: 0 | Status: COMPLETED | OUTPATIENT
Start: 2021-01-28 | End: 2021-01-28

## 2021-01-28 RX ORDER — PILOCARPINE HCL 4 %
1 DROPS OPHTHALMIC (EYE)
Refills: 0 | Status: DISCONTINUED | OUTPATIENT
Start: 2021-01-28 | End: 2021-02-02

## 2021-01-28 RX ORDER — LATANOPROST 0.05 MG/ML
1 SOLUTION/ DROPS OPHTHALMIC; TOPICAL AT BEDTIME
Refills: 0 | Status: DISCONTINUED | OUTPATIENT
Start: 2021-01-28 | End: 2021-02-02

## 2021-01-28 RX ORDER — DEXTROSE 50 % IN WATER 50 %
12.5 SYRINGE (ML) INTRAVENOUS ONCE
Refills: 0 | Status: COMPLETED | OUTPATIENT
Start: 2021-01-28 | End: 2021-01-28

## 2021-01-28 RX ADMIN — Medication 100 MILLIEQUIVALENT(S): at 08:42

## 2021-01-28 RX ADMIN — PANTOPRAZOLE SODIUM 40 MILLIGRAM(S): 20 TABLET, DELAYED RELEASE ORAL at 19:16

## 2021-01-28 RX ADMIN — PANTOPRAZOLE SODIUM 40 MILLIGRAM(S): 20 TABLET, DELAYED RELEASE ORAL at 05:35

## 2021-01-28 RX ADMIN — SODIUM CHLORIDE 75 MILLILITER(S): 9 INJECTION INTRAMUSCULAR; INTRAVENOUS; SUBCUTANEOUS at 19:16

## 2021-01-28 RX ADMIN — Medication 12.5 GRAM(S): at 12:43

## 2021-01-28 RX ADMIN — Medication 1 DROP(S): at 12:00

## 2021-01-28 RX ADMIN — Medication 50 GRAM(S): at 08:43

## 2021-01-28 RX ADMIN — Medication 1 DROP(S): at 23:38

## 2021-01-28 NOTE — PROGRESS NOTE ADULT - ASSESSMENT
87 yo M with PMH of diverticular and hemorrhoidal BRBPR, HTN, COPD, GERALD, glaucoma here for repeat episodes of BRBPR, s/p colonoscopy showing diverticulosis with no evidence active bleeding 1/25, s/p CTA AbdP with no evidence of active bleed 1/27, hemodynamically stable, Hgb wnl after receiving 1UPRBCs.    Neuro: no issues  Resp: Good O2 sats on RA, CPAP at night  CV: vitals stable; 2AM Hgb 7.7, 6AM Hgb 9.9 s/p 1UPRBCs; repeat CBC @8Am  GI: NPO, LR @75, on Protonix; possible repeat colonoscopy if another episode of BRBPR  : voiding well, no farrar  Endo: ISS  ID: no issues, WBC wnl  PPX: SCDs, ambulating well, no SQH per episodes of GIB 87 yo M with PMH of diverticular and hemorrhoidal BRBPR, HTN, COPD, GERALD, glaucoma here for repeat episodes of BRBPR, s/p colonoscopy showing diverticulosis with no evidence active bleeding 1/25, s/p CTA AbdP with no evidence of active bleed 1/27, hemodynamically stable, Hgb wnl after receiving 1UPRBCs.    Neuro: no issues  Resp: Good O2 sats on RA, CPAP at night  CV: vitals stable  GI: NPO, LR @75, on Protonix; possible repeat colonoscopy if another episode of BRBPR  : voiding well, no farrar  Endo: ISS  Heme:  2AM Hgb 7.7, 7AM Hgb 9.9 s/p 1UPRBCs; 1/28 repeat CBC Q6 @1pm @7pm   ID: no issues, WBC wnl  PPX: SCDs, encourage ambulating, no SQH per episodes of GIB

## 2021-01-28 NOTE — PROGRESS NOTE ADULT - ASSESSMENT
87 y/o M pmh HTN, COPD, GERALD (on CPAP) Diverticulosis, Hemorrhoids s/p hemorrhoidectomy (2018), Glaucoma, Aortic Root Aneurysm, L Inguinal hernia p/w blood per rectum.    Diverticular bleed  colonoscopy performed on 1/25 showed old heme, with evidence of diverticular disease of left and right colon, but no active bleeding  Patient rebleed 1/27, with drop in hemoglobin. CTA performed shows no evidence of active bleed  Upon examination, patient remains hemodynamically stable, with no further evidence of bleeding and with appropriate response to prbc transfusion  It is likely that he has now stopped bleeding. At this time, given that he is not actively bleed, and we know the source of his bleed, will hold off on repeating colonoscopy at this time  If he continues to rebleed, will consider repeating colonoscopy. Given the natural history of diverticular disease, it can often be difficult to cath the diverticula actively bleeding  Trend CBC     87 y/o M pmh HTN, COPD, GERALD (on CPAP) Diverticulosis, Hemorrhoids s/p hemorrhoidectomy (2018), Glaucoma, Aortic Root Aneurysm, L Inguinal hernia p/w blood per rectum.    Diverticular bleed  colonoscopy performed on 1/25 showed old heme, with evidence of diverticular disease of left and right colon, but no active bleeding  Patient rebleed 1/27, with drop in hemoglobin. CTA performed shows no evidence of active bleed  Upon examination, patient remains hemodynamically stable, with no further evidence of bleeding and with appropriate response to prbc transfusion  It is likely that he has now stopped bleeding. At this time, given that he is not actively bleed, and we know the source of his bleed, will hold off on repeating colonoscopy at this time  If he continues to rebleed, will consider repeating colonoscopy. Given the natural history of diverticular disease, it can often be difficult to cath the diverticula actively bleeding  Trend CBC  can advance to full liquid diet

## 2021-01-28 NOTE — DIETITIAN INITIAL EVALUATION ADULT. - ADD RECOMMEND
Monitor Skin, Wts. GI/Pain management per team. Monitor for GOC- Honor at all times. RD to remain available for additional nutrition interventions as needed.

## 2021-01-28 NOTE — DIETITIAN INITIAL EVALUATION ADULT. - OTHER INFO
87 y/o M pmh HTN, COPD, GERALD (on CPAP) Diverticulosis, Hemorrhoids s/p hemorrhoidectomy (2018), Glaucoma, Aortic Root Aneurysm, L Inguinal hernia p/w blood per rectum. Presentation due to diverticulosis vs hemorrhoids. S/p colonoscopy performed on 1/25 showed old heme, with evidence of diverticular disease of left and right colon, but no active bleeding. Pt with rebleed 1/27, with drop in hemoglobin. CTA performed shows no evidence of active bleed. If he continues to rebleed, will consider repeating colonoscopy. No pain. [place] 18. 1+ Edema left arm. Skin tear to left arm/forearm. 85 y/o M pmh HTN, COPD, GERALD (on CPAP) Diverticulosis, Hemorrhoids s/p hemorrhoidectomy (2018), Glaucoma, Aortic Root Aneurysm, L Inguinal hernia p/w blood per rectum. Presentation due to diverticulosis vs hemorrhoids. S/p colonoscopy performed on 1/25 showed old heme, with evidence of diverticular disease of left and right colon, but no active bleeding. Pt with rebleed 1/27, with drop in hemoglobin. CTA performed shows no evidence of active bleed. If he continues to rebleed, will consider repeating colonoscopy. No pain. Fla[place] 18. 1+ Edema left arm. Skin tear to left arm/forearm. BM 1/27 (bloody), Nondistended.   Spoke with RN. Pt soundly sleeping in CCU; under SICU Team. Pt NPO @ this time. RN reports per team plan to remain NPO; team confirms. Prior use of Praveen Clears noted 1/27 - SICU unsure of reason as to why Praveen Clears used. %PO Intake or tolerance to Clears Not stated.  Please see below for nutritions recommendations.

## 2021-01-28 NOTE — PROGRESS NOTE ADULT - ASSESSMENT
85 yo M w/ PMH of HTN, COPD, GERALD (on CPAP) Diverticulosis, Hemorrhoids s/p hemorrhoidectomy (2018), Glaucoma, Aortic Root Aneurysm, L Inguinal hernia p/w blood per rectum. Afebrile, HD stable,  WBC 13.12 H/H 11.3/36.0, K 6.1, Cr 1.09. Presentation due to diverticulosis vs hemorrhoids. Now s/p Colonoscopy (1/25).    CBC Q6 hours  Primary care per SICU team

## 2021-01-28 NOTE — PROGRESS NOTE ADULT - SUBJECTIVE AND OBJECTIVE BOX
OVERNIGHT EVENTS: 2AM CBC shows Hgb of 7.7, patient given 1 UPRBCs    SUBJECTIVE / INTERVAL HPI: Patient seen and examined at bedside. Patient states he has no pain and has felt well since his unit of PRBCs early this AM, denies any subjective fevers or chills. Denies any abdominal pain, N/V, any more bloody BM sine 1/27 AM, +flatus. States yesterday when walking up and down the halls and to CT he felt a little dizzy and SOB, but denies any chest pain and states he feels perfectly fine sitting in bed and otherwise has no complaints.     VITAL SIGNS:  Vital Signs Last 24 Hrs  T(C): 36.9 (28 Jan 2021 03:00), Max: 37.6 (27 Jan 2021 17:14)  T(F): 98.4 (28 Jan 2021 03:00), Max: 99.6 (27 Jan 2021 17:14)  HR: 72 (28 Jan 2021 07:00) (63 - 109)  BP: 126/53 (28 Jan 2021 07:00) (94/55 - 154/71)  BP(mean): 77 (28 Jan 2021 07:00) (70 - 102)  RR: 23 (28 Jan 2021 07:00) (10 - 23)  SpO2: 98% (28 Jan 2021 07:00) (95% - 100%)    01-27-21 @ 07:01  -  01-28-21 @ 07:00  --------------------------------------------------------  IN: 1600 mL / OUT: 3075 mL / NET: -1475 mL        PHYSICAL EXAM:    General: WDWN  HEENT: NC/AT; PERRL, anicteric sclera; MMM  Neck: supple  Cardiovascular: +S1/S2; RRR no MRG  Respiratory: CTA B/L; no W/R/R  Gastrointestinal: soft, NT/ND; +BSx4  Extremities: WWP; no edema, clubbing or cyanosis  Vascular: 2+ radial, DP/PT pulses B/L  Neurological: AAOx3; no focal deficits    MEDICATIONS:  MEDICATIONS  (STANDING):  dextrose 40% Gel 15 Gram(s) Oral once  dextrose 5%. 1000 milliLiter(s) (50 mL/Hr) IV Continuous <Continuous>  dextrose 5%. 1000 milliLiter(s) (100 mL/Hr) IV Continuous <Continuous>  dextrose 50% Injectable 25 Gram(s) IV Push once  dextrose 50% Injectable 12.5 Gram(s) IV Push once  dextrose 50% Injectable 25 Gram(s) IV Push once  glucagon  Injectable 1 milliGRAM(s) IntraMuscular once  insulin lispro (ADMELOG) corrective regimen sliding scale   SubCutaneous Before meals and at bedtime  pantoprazole  Injectable 40 milliGRAM(s) IV Push two times a day  sodium chloride 0.9%. 1000 milliLiter(s) (75 mL/Hr) IV Continuous <Continuous>  timolol 0.5% Solution 1 Drop(s) Both EYES daily    MEDICATIONS  (PRN):      ALLERGIES:  Allergies    No Known Allergies    Intolerances        LABS:                        9.9    9.07  )-----------( 123      ( 28 Jan 2021 06:44 )             30.7     01-27    142  |  105  |  12  ----------------------------<  92  3.7   |  31  |  0.79    Ca    8.3<L>      27 Jan 2021 08:33  Phos  2.2     01-27  Mg     2.0     01-27      PT/INR - ( 27 Jan 2021 19:52 )   PT: 12.4 sec;   INR: 1.04          PTT - ( 27 Jan 2021 19:52 )  PTT:22.5 sec    CAPILLARY BLOOD GLUCOSE      POCT Blood Glucose.: 93 mg/dL (27 Jan 2021 21:07)        RADIOLOGY & ADDITIONAL TESTS: Reviewed.    ASSESSMENT:    PLAN:  OVERNIGHT EVENTS: 2AM CBC shows Hgb of 7.7, patient given 1 UPRBCs    SUBJECTIVE / INTERVAL HPI: Patient seen and examined at bedside. Patient states he has no pain and has felt well since his unit of PRBCs early this AM, denies any subjective fevers or chills. Denies any abdominal pain, N/V, any more bloody BM since 1/27 AM, +flatus. States yesterday when walking up and down the halls and to CT he felt a little dizzy and SOB, but denies any chest pain and states he feels perfectly fine sitting in bed and otherwise has no complaints.     VITAL SIGNS:  Vital Signs Last 24 Hrs  T(C): 36.9 (28 Jan 2021 03:00), Max: 37.6 (27 Jan 2021 17:14)  T(F): 98.4 (28 Jan 2021 03:00), Max: 99.6 (27 Jan 2021 17:14)  HR: 72 (28 Jan 2021 07:00) (63 - 109)  BP: 126/53 (28 Jan 2021 07:00) (94/55 - 154/71)  BP(mean): 77 (28 Jan 2021 07:00) (70 - 102)  RR: 23 (28 Jan 2021 07:00) (10 - 23)  SpO2: 98% (28 Jan 2021 07:00) (95% - 100%)    01-27-21 @ 07:01  -  01-28-21 @ 07:00  --------------------------------------------------------  IN: 1600 mL / OUT: 3075 mL / NET: -1475 mL        PHYSICAL EXAM:    General: WDWN  HEENT: NC/AT; PERRL, anicteric sclera; MMM  Neck: supple  Cardiovascular: +S1/S2; RRR no MRG  Respiratory: CTA B/L; no W/R/R  Gastrointestinal: soft, NT/ND; +BSx4  Extremities: WWP; no edema, clubbing or cyanosis  Vascular: 2+ radial, DP/PT pulses B/L  Neurological: AAOx3; no focal deficits    MEDICATIONS:  MEDICATIONS  (STANDING):  dextrose 40% Gel 15 Gram(s) Oral once  dextrose 5%. 1000 milliLiter(s) (50 mL/Hr) IV Continuous <Continuous>  dextrose 5%. 1000 milliLiter(s) (100 mL/Hr) IV Continuous <Continuous>  dextrose 50% Injectable 25 Gram(s) IV Push once  dextrose 50% Injectable 12.5 Gram(s) IV Push once  dextrose 50% Injectable 25 Gram(s) IV Push once  glucagon  Injectable 1 milliGRAM(s) IntraMuscular once  insulin lispro (ADMELOG) corrective regimen sliding scale   SubCutaneous Before meals and at bedtime  pantoprazole  Injectable 40 milliGRAM(s) IV Push two times a day  sodium chloride 0.9%. 1000 milliLiter(s) (75 mL/Hr) IV Continuous <Continuous>  timolol 0.5% Solution 1 Drop(s) Both EYES daily    MEDICATIONS  (PRN):      ALLERGIES:  Allergies    No Known Allergies    Intolerances        LABS:                        9.9    9.07  )-----------( 123      ( 28 Jan 2021 06:44 )             30.7     01-27    142  |  105  |  12  ----------------------------<  92  3.7   |  31  |  0.79    Ca    8.3<L>      27 Jan 2021 08:33  Phos  2.2     01-27  Mg     2.0     01-27      PT/INR - ( 27 Jan 2021 19:52 )   PT: 12.4 sec;   INR: 1.04          PTT - ( 27 Jan 2021 19:52 )  PTT:22.5 sec    CAPILLARY BLOOD GLUCOSE      POCT Blood Glucose.: 93 mg/dL (27 Jan 2021 21:07)        RADIOLOGY & ADDITIONAL TESTS: Reviewed.    ASSESSMENT:    PLAN:

## 2021-01-28 NOTE — PROGRESS NOTE ADULT - SUBJECTIVE AND OBJECTIVE BOX
SUBJECTIVE: Yesterday @ 8 p.m. his Hgb was 8.4 and then the repeat @ 2 a.m. was 7.7. The patient received 1 unit of pRBCs. Overnight there were no significant events, this morning the patient is feeling well. He was examined at the bedside by the chief resident. The patient denies having chest pain, SOB, dizziness, chills, nausea, vomiting.      Vital Signs Last 24 Hrs  T(C): 36.9 (28 Jan 2021 03:00), Max: 37.6 (27 Jan 2021 17:14)  T(F): 98.4 (28 Jan 2021 03:00), Max: 99.6 (27 Jan 2021 17:14)  HR: 68 (28 Jan 2021 06:00) (63 - 109)  BP: 117/55 (28 Jan 2021 06:00) (94/55 - 154/71)  BP(mean): 79 (28 Jan 2021 06:00) (70 - 102)  RR: 14 (28 Jan 2021 06:00) (10 - 22)  SpO2: 97% (28 Jan 2021 06:00) (95% - 100%)    Physical Exam:  General: NAD, resting comfortably in the bed  Pulmonary: Nonlabored breathing, no respiratory distress  Abdominal: soft, NT/ND, no rebound tenderness, guarding, rigidity  Extremities: WWP, normal strength, no clubbing/cyanosis/edema  Neuro: AAOx3    Lines/drains/tubes:    I&O's Summary    26 Jan 2021 07:01  -  27 Jan 2021 07:00  --------------------------------------------------------  IN: 1330 mL / OUT: 3400 mL / NET: -2070 mL    27 Jan 2021 07:01  -  28 Jan 2021 06:38  --------------------------------------------------------  IN: 1525 mL / OUT: 3075 mL / NET: -1550 mL        LABS:                        7.7    9.86  )-----------( 117      ( 28 Jan 2021 02:12 )             23.8     01-27    142  |  105  |  12  ----------------------------<  92  3.7   |  31  |  0.79    Ca    8.3<L>      27 Jan 2021 08:33  Phos  2.2     01-27  Mg     2.0     01-27      PT/INR - ( 27 Jan 2021 19:52 )   PT: 12.4 sec;   INR: 1.04          PTT - ( 27 Jan 2021 19:52 )  PTT:22.5 sec    CAPILLARY BLOOD GLUCOSE      POCT Blood Glucose.: 93 mg/dL (27 Jan 2021 21:07)  POCT Blood Glucose.: 94 mg/dL (27 Jan 2021 17:03)        RADIOLOGY & ADDITIONAL STUDIES:

## 2021-01-28 NOTE — DIETITIAN INITIAL EVALUATION ADULT. - DIET TYPE
Adv diet as medically feasible and tolerated: Clears -> full liquids -> soft diet, add low fiber on PRN Pending confirmation / source of GIB

## 2021-01-28 NOTE — PROGRESS NOTE ADULT - SUBJECTIVE AND OBJECTIVE BOX
Pt seen and examined at bedside.  Received one more unit  No further bleeding overnight or this morning    PERTINENT REVIEW OF SYSTEMS:  CONSTITUTIONAL: No weakness, fevers or chills  HEENT: No visual changes; No vertigo or throat pain   GASTROINTESTINAL: No abdominal or epigastric pain. No nausea, vomiting, or hematemesis; No diarrhea or constipation. No melena or hematochezia.  NEUROLOGICAL: No numbness or weakness  SKIN: No itching, burning, rashes, or lesions     Allergies    No Known Allergies    Intolerances      MEDICATIONS:  MEDICATIONS  (STANDING):  dextrose 40% Gel 15 Gram(s) Oral once  dextrose 5%. 1000 milliLiter(s) (50 mL/Hr) IV Continuous <Continuous>  dextrose 5%. 1000 milliLiter(s) (100 mL/Hr) IV Continuous <Continuous>  dextrose 50% Injectable 25 Gram(s) IV Push once  dextrose 50% Injectable 12.5 Gram(s) IV Push once  dextrose 50% Injectable 25 Gram(s) IV Push once  glucagon  Injectable 1 milliGRAM(s) IntraMuscular once  insulin lispro (ADMELOG) corrective regimen sliding scale   SubCutaneous Before meals and at bedtime  magnesium sulfate  IVPB 2 Gram(s) IV Intermittent once  pantoprazole  Injectable 40 milliGRAM(s) IV Push two times a day  potassium chloride  10 mEq/100 mL IVPB 10 milliEquivalent(s) IV Intermittent once  sodium chloride 0.9%. 1000 milliLiter(s) (75 mL/Hr) IV Continuous <Continuous>  timolol 0.5% Solution 1 Drop(s) Both EYES daily    MEDICATIONS  (PRN):    Vital Signs Last 24 Hrs  T(C): 36.9 (28 Jan 2021 03:00), Max: 37.6 (27 Jan 2021 17:14)  T(F): 98.4 (28 Jan 2021 03:00), Max: 99.6 (27 Jan 2021 17:14)  HR: 61 (28 Jan 2021 08:00) (61 - 109)  BP: 104/58 (28 Jan 2021 08:00) (94/55 - 154/71)  BP(mean): 78 (28 Jan 2021 08:00) (70 - 102)  RR: 14 (28 Jan 2021 08:00) (10 - 23)  SpO2: 96% (28 Jan 2021 08:00) (95% - 100%)    01-27 @ 07:01 - 01-28 @ 07:00  --------------------------------------------------------  IN: 1600 mL / OUT: 3325 mL / NET: -1725 mL    01-28 @ 07:01 - 01-28 @ 08:34  --------------------------------------------------------  IN: 75 mL / OUT: 0 mL / NET: 75 mL      PHYSICAL EXAM:    General: Well developed; well nourished; in no acute distress  HEENT: MMM, conjunctiva and sclera clear  Gastrointestinal: Soft non-tender non-distended; Normal bowel sounds; No hepatosplenomegaly. No rebound or guarding  Skin: Warm and dry. No obvious rash    LABS:                        9.9    9.07  )-----------( 123      ( 28 Jan 2021 06:44 )             30.7     01-28    142  |  105  |  8   ----------------------------<  87  3.8   |  26  |  0.68    Ca    8.2<L>      28 Jan 2021 06:44  Phos  3.2     01-28  Mg     1.8     01-28      PT/INR - ( 27 Jan 2021 19:52 )   PT: 12.4 sec;   INR: 1.04          PTT - ( 27 Jan 2021 19:52 )  PTT:22.5 sec                  RADIOLOGY & ADDITIONAL STUDIES:

## 2021-01-28 NOTE — DIETITIAN INITIAL EVALUATION ADULT. - OTHER CALCULATIONS
current body wt used for energy calculations as pt falls within % IBW; adjusted for age and current conditions

## 2021-01-29 LAB
ANION GAP SERPL CALC-SCNC: 7 MMOL/L — SIGNIFICANT CHANGE UP (ref 5–17)
BUN SERPL-MCNC: 7 MG/DL — SIGNIFICANT CHANGE UP (ref 7–23)
CALCIUM SERPL-MCNC: 8.4 MG/DL — SIGNIFICANT CHANGE UP (ref 8.4–10.5)
CHLORIDE SERPL-SCNC: 107 MMOL/L — SIGNIFICANT CHANGE UP (ref 96–108)
CO2 SERPL-SCNC: 27 MMOL/L — SIGNIFICANT CHANGE UP (ref 22–31)
CREAT SERPL-MCNC: 0.66 MG/DL — SIGNIFICANT CHANGE UP (ref 0.5–1.3)
GLUCOSE BLDC GLUCOMTR-MCNC: 103 MG/DL — HIGH (ref 70–99)
GLUCOSE BLDC GLUCOMTR-MCNC: 121 MG/DL — HIGH (ref 70–99)
GLUCOSE BLDC GLUCOMTR-MCNC: 127 MG/DL — HIGH (ref 70–99)
GLUCOSE BLDC GLUCOMTR-MCNC: 88 MG/DL — SIGNIFICANT CHANGE UP (ref 70–99)
GLUCOSE SERPL-MCNC: 98 MG/DL — SIGNIFICANT CHANGE UP (ref 70–99)
HCT VFR BLD CALC: 28.7 % — LOW (ref 39–50)
HCT VFR BLD CALC: 30.3 % — LOW (ref 39–50)
HCT VFR BLD CALC: 30.4 % — LOW (ref 39–50)
HCT VFR BLD CALC: 36 % — LOW (ref 39–50)
HGB BLD-MCNC: 11 G/DL — LOW (ref 13–17)
HGB BLD-MCNC: 9.3 G/DL — LOW (ref 13–17)
HGB BLD-MCNC: 9.5 G/DL — LOW (ref 13–17)
HGB BLD-MCNC: 9.6 G/DL — LOW (ref 13–17)
MAGNESIUM SERPL-MCNC: 2.1 MG/DL — SIGNIFICANT CHANGE UP (ref 1.6–2.6)
MCHC RBC-ENTMCNC: 29.8 PG — SIGNIFICANT CHANGE UP (ref 27–34)
MCHC RBC-ENTMCNC: 30.6 GM/DL — LOW (ref 32–36)
MCHC RBC-ENTMCNC: 30.6 PG — SIGNIFICANT CHANGE UP (ref 27–34)
MCHC RBC-ENTMCNC: 30.8 PG — SIGNIFICANT CHANGE UP (ref 27–34)
MCHC RBC-ENTMCNC: 31.1 PG — SIGNIFICANT CHANGE UP (ref 27–34)
MCHC RBC-ENTMCNC: 31.4 GM/DL — LOW (ref 32–36)
MCHC RBC-ENTMCNC: 31.6 GM/DL — LOW (ref 32–36)
MCHC RBC-ENTMCNC: 32.4 GM/DL — SIGNIFICANT CHANGE UP (ref 32–36)
MCV RBC AUTO: 95 FL — SIGNIFICANT CHANGE UP (ref 80–100)
MCV RBC AUTO: 97.6 FL — SIGNIFICANT CHANGE UP (ref 80–100)
MCV RBC AUTO: 97.7 FL — SIGNIFICANT CHANGE UP (ref 80–100)
MCV RBC AUTO: 98.4 FL — SIGNIFICANT CHANGE UP (ref 80–100)
NRBC # BLD: 0 /100 WBCS — SIGNIFICANT CHANGE UP (ref 0–0)
PHOSPHATE SERPL-MCNC: 3.2 MG/DL — SIGNIFICANT CHANGE UP (ref 2.5–4.5)
PLATELET # BLD AUTO: 118 K/UL — LOW (ref 150–400)
PLATELET # BLD AUTO: 128 K/UL — LOW (ref 150–400)
PLATELET # BLD AUTO: 136 K/UL — LOW (ref 150–400)
PLATELET # BLD AUTO: 163 K/UL — SIGNIFICANT CHANGE UP (ref 150–400)
POTASSIUM SERPL-MCNC: 4 MMOL/L — SIGNIFICANT CHANGE UP (ref 3.5–5.3)
POTASSIUM SERPL-SCNC: 4 MMOL/L — SIGNIFICANT CHANGE UP (ref 3.5–5.3)
RBC # BLD: 3.02 M/UL — LOW (ref 4.2–5.8)
RBC # BLD: 3.09 M/UL — LOW (ref 4.2–5.8)
RBC # BLD: 3.1 M/UL — LOW (ref 4.2–5.8)
RBC # BLD: 3.69 M/UL — LOW (ref 4.2–5.8)
RBC # FLD: 14.6 % — HIGH (ref 10.3–14.5)
RBC # FLD: 14.6 % — HIGH (ref 10.3–14.5)
RBC # FLD: 15 % — HIGH (ref 10.3–14.5)
RBC # FLD: 15.4 % — HIGH (ref 10.3–14.5)
SODIUM SERPL-SCNC: 141 MMOL/L — SIGNIFICANT CHANGE UP (ref 135–145)
WBC # BLD: 8.16 K/UL — SIGNIFICANT CHANGE UP (ref 3.8–10.5)
WBC # BLD: 8.48 K/UL — SIGNIFICANT CHANGE UP (ref 3.8–10.5)
WBC # BLD: 8.5 K/UL — SIGNIFICANT CHANGE UP (ref 3.8–10.5)
WBC # BLD: 9.27 K/UL — SIGNIFICANT CHANGE UP (ref 3.8–10.5)
WBC # FLD AUTO: 8.16 K/UL — SIGNIFICANT CHANGE UP (ref 3.8–10.5)
WBC # FLD AUTO: 8.48 K/UL — SIGNIFICANT CHANGE UP (ref 3.8–10.5)
WBC # FLD AUTO: 8.5 K/UL — SIGNIFICANT CHANGE UP (ref 3.8–10.5)
WBC # FLD AUTO: 9.27 K/UL — SIGNIFICANT CHANGE UP (ref 3.8–10.5)

## 2021-01-29 PROCEDURE — 99232 SBSQ HOSP IP/OBS MODERATE 35: CPT | Mod: GC

## 2021-01-29 RX ADMIN — LATANOPROST 1 DROP(S): 0.05 SOLUTION/ DROPS OPHTHALMIC; TOPICAL at 21:29

## 2021-01-29 RX ADMIN — PANTOPRAZOLE SODIUM 40 MILLIGRAM(S): 20 TABLET, DELAYED RELEASE ORAL at 05:59

## 2021-01-29 RX ADMIN — SODIUM CHLORIDE 75 MILLILITER(S): 9 INJECTION INTRAMUSCULAR; INTRAVENOUS; SUBCUTANEOUS at 03:26

## 2021-01-29 RX ADMIN — Medication 1 DROP(S): at 18:18

## 2021-01-29 RX ADMIN — PANTOPRAZOLE SODIUM 40 MILLIGRAM(S): 20 TABLET, DELAYED RELEASE ORAL at 18:18

## 2021-01-29 RX ADMIN — Medication 1 DROP(S): at 05:58

## 2021-01-29 RX ADMIN — Medication 1 DROP(S): at 11:34

## 2021-01-29 RX ADMIN — Medication 1 DROP(S): at 11:35

## 2021-01-29 NOTE — PROGRESS NOTE ADULT - ASSESSMENT
85 yo M w/ PMH of HTN, COPD, GERALD (on CPAP) Diverticulosis, Hemorrhoids s/p hemorrhoidectomy (2018), Glaucoma, Aortic Root Aneurysm, L Inguinal hernia p/w blood per rectum. Afebrile, HD stable,  WBC 13.12 H/H 11.3/36.0, K 6.1, Cr 1.09. Presentation due to diverticulosis vs hemorrhoids. Now s/p Colonoscopy (1/25).    CBC Q6 hours  Primary care per SICU team 85 yo M w/ PMH of HTN, COPD, GERALD (on CPAP) Diverticulosis, Hemorrhoids s/p hemorrhoidectomy (2018), Glaucoma, Aortic Root Aneurysm, L Inguinal hernia p/w blood per rectum. Afebrile, HD stable,  WBC 13.12 H/H 11.3/36.0, K 6.1, Cr 1.09. Presentation due to diverticulosis vs hemorrhoids. Now s/p Colonoscopy (1/25).    LRD  CBC Q6 hours  Primary care per SICU team  Step down later today

## 2021-01-29 NOTE — PROGRESS NOTE ADULT - SUBJECTIVE AND OBJECTIVE BOX
OVERNIGHT EVENTS: Midnight Hgb 9.3, 5AM Hgb 9.5    SUBJECTIVE / INTERVAL HPI: Patient seen and examined at bedside. Patient states they feel well and have no pain. Denies any bloody or normal BM, +flatus, denies any abdominal pain. Reports no dizziness, SOB or chest pain. Tolerating clear liquids says he feels hungry and would like to advance his diet.    VITAL SIGNS:  Vital Signs Last 24 Hrs  T(C): 36.7 (29 Jan 2021 00:55), Max: 37.1 (28 Jan 2021 09:00)  T(F): 98.1 (29 Jan 2021 00:55), Max: 98.7 (28 Jan 2021 09:00)  HR: 59 (29 Jan 2021 06:00) (59 - 88)  BP: 135/58 (29 Jan 2021 06:00) (93/59 - 145/63)  BP(mean): 84 (29 Jan 2021 06:00) (70 - 99)  RR: 15 (29 Jan 2021 06:00) (12 - 30)  SpO2: 97% (29 Jan 2021 06:00) (94% - 100%)    01-27-21 @ 07:01 - 01-28-21 @ 07:00  --------------------------------------------------------  IN: 1600 mL / OUT: 3325 mL / NET: -1725 mL    01-28-21 @ 07:01 - 01-29-21 @ 06:53  --------------------------------------------------------  IN: 1675 mL / OUT: 3360 mL / NET: -1685 mL        PHYSICAL EXAM:    General: WDWN  HEENT: NC/AT; PERRL, anicteric sclera; MMM  Neck: supple  Cardiovascular: +S1/S2; RRR  Respiratory: CTA B/L; no W/R/R  Gastrointestinal: soft, NT/ND; +BSx4  Extremities: WWP; no edema, clubbing or cyanosis  Vascular: 2+ radial, DP/PT pulses B/L  Neurological: AAOx3; no focal deficits    MEDICATIONS:  MEDICATIONS  (STANDING):  dextrose 40% Gel 15 Gram(s) Oral once  dextrose 5%. 1000 milliLiter(s) (50 mL/Hr) IV Continuous <Continuous>  dextrose 5%. 1000 milliLiter(s) (100 mL/Hr) IV Continuous <Continuous>  dextrose 50% Injectable 25 Gram(s) IV Push once  dextrose 50% Injectable 12.5 Gram(s) IV Push once  dextrose 50% Injectable 25 Gram(s) IV Push once  glucagon  Injectable 1 milliGRAM(s) IntraMuscular once  insulin lispro (ADMELOG) corrective regimen sliding scale   SubCutaneous Before meals and at bedtime  latanoprost 0.005% Ophthalmic Solution 1 Drop(s) Both EYES at bedtime  pantoprazole  Injectable 40 milliGRAM(s) IV Push two times a day  pilocarpine 4% Solution 1 Drop(s) Both EYES four times a day  timolol 0.5% Solution 1 Drop(s) Both EYES daily    MEDICATIONS  (PRN):      ALLERGIES:  Allergies    No Known Allergies    Intolerances        LABS:                        9.5    8.16  )-----------( 128      ( 29 Jan 2021 05:56 )             30.3     01-29    141  |  107  |  7   ----------------------------<  98  4.0   |  27  |  0.66    Ca    8.4      29 Jan 2021 05:56  Phos  3.2     01-29  Mg     2.1     01-29      PT/INR - ( 27 Jan 2021 19:52 )   PT: 12.4 sec;   INR: 1.04          PTT - ( 27 Jan 2021 19:52 )  PTT:22.5 sec    CAPILLARY BLOOD GLUCOSE      POCT Blood Glucose.: 103 mg/dL (29 Jan 2021 05:36)        RADIOLOGY & ADDITIONAL TESTS: Reviewed.

## 2021-01-29 NOTE — PROGRESS NOTE ADULT - ASSESSMENT
85 y/o M pmh HTN, COPD, GERALD (on CPAP) Diverticulosis, Hemorrhoids s/p hemorrhoidectomy (2018), Glaucoma, Aortic Root Aneurysm, L Inguinal hernia p/w blood per rectum.    Diverticular bleed- resolved  colonoscopy performed on 1/25 showed old heme, with evidence of diverticular disease of left and right colon, but no active bleeding  Patient rebleed 1/27, with drop in hemoglobin. CTA on 1/27 showed no evidence of active bleed  Since then, patient remains hemodynamically stable, with no further evidence of bleeding  If he continues to rebleed, will consider repeating colonoscopy. Given the natural history of diverticular disease, it can often be difficult to cath the diverticula actively bleeding  Trend CBC  can advance diet    Please notify GI if any signs of bleeding. Thank you     85 y/o M pmh HTN, COPD, GERALD (on CPAP) Diverticulosis, Hemorrhoids s/p hemorrhoidectomy (2018), Glaucoma, Aortic Root Aneurysm, L Inguinal hernia p/w blood per rectum.    Diverticular bleed- resolved  colonoscopy performed on 1/25 showed old heme, with evidence of diverticular disease of left and right colon, but no active bleeding  Patient rebleed 1/27, with drop in hemoglobin. CTA on 1/27 showed no evidence of active bleed  Since then, patient remains hemodynamically stable, with no further evidence of bleeding  If he continues to rebleed, will consider repeating colonoscopy.   Trend CBC  can advance diet    Please notify GI if any signs of bleeding. Thank you.     87 y/o M pmh HTN, COPD, GERALD (on CPAP) Diverticulosis, Hemorrhoids s/p hemorrhoidectomy (2018), Glaucoma, Aortic Root Aneurysm, L Inguinal hernia p/w blood per rectum.    Diverticular bleed- resolved  colonoscopy performed on 1/25 showed old heme, with evidence of diverticular disease of left and right colon, but no active bleeding  Patient rebleed 1/27, with drop in hemoglobin. CTA on 1/27 showed no evidence of active bleed  Since then, patient remains hemodynamically stable, with no further evidence of bleeding  If he continues to rebleed, will consider repeating colonoscopy.   Trend CBC  can advance diet    Addendum:  Patient had dark BM this afternoon, possibly passing old blood. Vitals are stable. Hemoglobin stable  Will continue to monitor, GI to follow

## 2021-01-29 NOTE — PROGRESS NOTE ADULT - ATTENDING COMMENTS
Able to ambulate and is hungry. Diet to be advanced and H/H stable. To transfer to telemetry for continued monitoring.
Hemoglobin remains stable. Discussed with surgical team and will transfer to step down unit for additional monitoring.
Noon hemoglobin stable at 9.6.

## 2021-01-29 NOTE — PROGRESS NOTE ADULT - SUBJECTIVE AND OBJECTIVE BOX
SUBJECTIVE: Overnight there were no significant events, this morning the patient is feeling well. His Hgb @ p.m. was 10.2, repeat Hgb @ midnight 9.3, this morning it is 9.5. The patient denies having any bloody bowel movements, dizziness, lightheadedness. He was examined at the bedside by the chief resident. The patient denies having chest pain, SOB, nausea, vomiting, chills.    Vital Signs Last 24 Hrs  T(C): 36.7 (29 Jan 2021 00:55), Max: 37.1 (28 Jan 2021 09:00)  T(F): 98.1 (29 Jan 2021 00:55), Max: 98.7 (28 Jan 2021 09:00)  HR: 59 (29 Jan 2021 06:00) (59 - 88)  BP: 135/58 (29 Jan 2021 06:00) (93/59 - 145/63)  BP(mean): 84 (29 Jan 2021 06:00) (70 - 99)  RR: 15 (29 Jan 2021 06:00) (12 - 30)  SpO2: 97% (29 Jan 2021 06:00) (94% - 100%)    Physical Exam:  General: NAD, resting comfortably in the bed  Pulmonary: Nonlabored breathing, no respiratory distress  Abdominal: soft, NT/ND, no rebound tenderness, guarding, rigidity  Extremities: WWP, normal strength, no clubbing/cyanosis/edema  Neuro: AAOx3    Lines/drains/tubes:    I&O's Summary    27 Jan 2021 07:01  -  28 Jan 2021 07:00  --------------------------------------------------------  IN: 1600 mL / OUT: 3325 mL / NET: -1725 mL    28 Jan 2021 07:01  -  29 Jan 2021 06:08  --------------------------------------------------------  IN: 1675 mL / OUT: 3360 mL / NET: -1685 mL        LABS:                        9.5    8.16  )-----------( 128      ( 29 Jan 2021 05:56 )             30.3     01-28    142  |  105  |  8   ----------------------------<  87  3.8   |  26  |  0.68    Ca    8.2<L>      28 Jan 2021 06:44  Phos  3.2     01-28  Mg     1.8     01-28      PT/INR - ( 27 Jan 2021 19:52 )   PT: 12.4 sec;   INR: 1.04          PTT - ( 27 Jan 2021 19:52 )  PTT:22.5 sec    CAPILLARY BLOOD GLUCOSE      POCT Blood Glucose.: 103 mg/dL (29 Jan 2021 05:36)  POCT Blood Glucose.: 85 mg/dL (28 Jan 2021 20:53)  POCT Blood Glucose.: 143 mg/dL (28 Jan 2021 17:44)  POCT Blood Glucose.: 75 mg/dL (28 Jan 2021 16:12)  POCT Blood Glucose.: 109 mg/dL (28 Jan 2021 13:19)  POCT Blood Glucose.: 79 mg/dL (28 Jan 2021 12:00)        RADIOLOGY & ADDITIONAL STUDIES:

## 2021-01-29 NOTE — PROGRESS NOTE ADULT - SUBJECTIVE AND OBJECTIVE BOX
Pt seen and examined at bedside.  He has not had a BM today- denies any further episodes of hematochezia      PERTINENT REVIEW OF SYSTEMS:  CONSTITUTIONAL: No weakness, fevers or chills  HEENT: No visual changes; No vertigo or throat pain   GASTROINTESTINAL: No abdominal or epigastric pain. No nausea, vomiting, or hematemesis; No diarrhea or constipation. No melena or hematochezia.  NEUROLOGICAL: No numbness or weakness  SKIN: No itching, burning, rashes, or lesions     Allergies    No Known Allergies    Intolerances      MEDICATIONS:  MEDICATIONS  (STANDING):  dextrose 40% Gel 15 Gram(s) Oral once  dextrose 5%. 1000 milliLiter(s) (50 mL/Hr) IV Continuous <Continuous>  dextrose 5%. 1000 milliLiter(s) (100 mL/Hr) IV Continuous <Continuous>  dextrose 50% Injectable 25 Gram(s) IV Push once  dextrose 50% Injectable 12.5 Gram(s) IV Push once  dextrose 50% Injectable 25 Gram(s) IV Push once  glucagon  Injectable 1 milliGRAM(s) IntraMuscular once  insulin lispro (ADMELOG) corrective regimen sliding scale   SubCutaneous Before meals and at bedtime  latanoprost 0.005% Ophthalmic Solution 1 Drop(s) Both EYES at bedtime  pantoprazole  Injectable 40 milliGRAM(s) IV Push two times a day  pilocarpine 4% Solution 1 Drop(s) Both EYES four times a day  timolol 0.5% Solution 1 Drop(s) Both EYES daily    MEDICATIONS  (PRN):    Vital Signs Last 24 Hrs  T(C): 36.9 (29 Jan 2021 07:00), Max: 37.1 (28 Jan 2021 09:00)  T(F): 98.4 (29 Jan 2021 07:00), Max: 98.7 (28 Jan 2021 09:00)  HR: 79 (29 Jan 2021 08:00) (59 - 88)  BP: 101/64 (29 Jan 2021 08:00) (93/59 - 145/63)  BP(mean): 78 (29 Jan 2021 08:00) (70 - 99)  RR: 16 (29 Jan 2021 08:00) (12 - 30)  SpO2: 100% (29 Jan 2021 08:00) (94% - 100%)    01-28 @ 07:01  -  01-29 @ 07:00  --------------------------------------------------------  IN: 1675 mL / OUT: 3360 mL / NET: -1685 mL    01-29 @ 07:01 - 01-29 @ 08:37  --------------------------------------------------------  IN: 200 mL / OUT: 400 mL / NET: -200 mL      PHYSICAL EXAM:    General: Well developed; well nourished; in no acute distress  HEENT: MMM, conjunctiva and sclera clear  Gastrointestinal: Soft non-tender non-distended; Normal bowel sounds; No hepatosplenomegaly. No rebound or guarding  Skin: Warm and dry. No obvious rash    LABS:                        9.5    8.16  )-----------( 128      ( 29 Jan 2021 05:56 )             30.3     01-29    141  |  107  |  7   ----------------------------<  98  4.0   |  27  |  0.66    Ca    8.4      29 Jan 2021 05:56  Phos  3.2     01-29  Mg     2.1     01-29      PT/INR - ( 27 Jan 2021 19:52 )   PT: 12.4 sec;   INR: 1.04          PTT - ( 27 Jan 2021 19:52 )  PTT:22.5 sec                  RADIOLOGY & ADDITIONAL STUDIES:

## 2021-01-29 NOTE — PROGRESS NOTE ADULT - ASSESSMENT
85 yo M with PMH of diverticular and hemorrhoidal BRBPR, HTN, COPD, GERALD, glaucoma here for repeat episodes of BRBPR, s/p colonoscopy showing diverticulosis with no evidence active bleeding 1/25, s/p CTA AbdP with no evidence of active bleed 1/27, hemodynamically stable, Hgb remains in normal limits 24 hrs post 1 UPRBCs.    Neuro: no issues; continue home glaucoma meds  Resp: Good O2 sats on RA, CPAP at night  CV: vitals stable  GI: on clear liquids, consider advancing diet, on Protonix; possible repeat colonoscopy if another episode of BRBPR  : voiding well, no farrar  Endo: ISS  Heme: Hgb wnl, 12AM 9.3 5AM 9.5; potentially stop Q6 CBC if no repeat BRBPR  ID: no issues, WBC wnl  PPX: SCDs, encourage ambulating, no SQH per episodes of GIB  Dispo: likely SDU

## 2021-01-30 LAB
ANION GAP SERPL CALC-SCNC: 8 MMOL/L — SIGNIFICANT CHANGE UP (ref 5–17)
APTT BLD: 27.1 SEC — LOW (ref 27.5–35.5)
BUN SERPL-MCNC: 11 MG/DL — SIGNIFICANT CHANGE UP (ref 7–23)
CALCIUM SERPL-MCNC: 8.6 MG/DL — SIGNIFICANT CHANGE UP (ref 8.4–10.5)
CHLORIDE SERPL-SCNC: 103 MMOL/L — SIGNIFICANT CHANGE UP (ref 96–108)
CO2 SERPL-SCNC: 30 MMOL/L — SIGNIFICANT CHANGE UP (ref 22–31)
CREAT SERPL-MCNC: 0.75 MG/DL — SIGNIFICANT CHANGE UP (ref 0.5–1.3)
GLUCOSE SERPL-MCNC: 107 MG/DL — HIGH (ref 70–99)
HCT VFR BLD CALC: 29 % — LOW (ref 39–50)
HCT VFR BLD CALC: 32.5 % — LOW (ref 39–50)
HGB BLD-MCNC: 10.2 G/DL — LOW (ref 13–17)
HGB BLD-MCNC: 9.2 G/DL — LOW (ref 13–17)
INR BLD: 0.94 — SIGNIFICANT CHANGE UP (ref 0.88–1.16)
MAGNESIUM SERPL-MCNC: 2 MG/DL — SIGNIFICANT CHANGE UP (ref 1.6–2.6)
MCHC RBC-ENTMCNC: 30.4 PG — SIGNIFICANT CHANGE UP (ref 27–34)
MCHC RBC-ENTMCNC: 30.8 PG — SIGNIFICANT CHANGE UP (ref 27–34)
MCHC RBC-ENTMCNC: 31.4 GM/DL — LOW (ref 32–36)
MCHC RBC-ENTMCNC: 31.7 GM/DL — LOW (ref 32–36)
MCV RBC AUTO: 97 FL — SIGNIFICANT CHANGE UP (ref 80–100)
MCV RBC AUTO: 97 FL — SIGNIFICANT CHANGE UP (ref 80–100)
NRBC # BLD: 0 /100 WBCS — SIGNIFICANT CHANGE UP (ref 0–0)
NRBC # BLD: 0 /100 WBCS — SIGNIFICANT CHANGE UP (ref 0–0)
PHOSPHATE SERPL-MCNC: 3.2 MG/DL — SIGNIFICANT CHANGE UP (ref 2.5–4.5)
PLATELET # BLD AUTO: 145 K/UL — LOW (ref 150–400)
PLATELET # BLD AUTO: 151 K/UL — SIGNIFICANT CHANGE UP (ref 150–400)
POTASSIUM SERPL-MCNC: 3.8 MMOL/L — SIGNIFICANT CHANGE UP (ref 3.5–5.3)
POTASSIUM SERPL-SCNC: 3.8 MMOL/L — SIGNIFICANT CHANGE UP (ref 3.5–5.3)
PROTHROM AB SERPL-ACNC: 11.3 SEC — SIGNIFICANT CHANGE UP (ref 10.6–13.6)
RBC # BLD: 2.99 M/UL — LOW (ref 4.2–5.8)
RBC # BLD: 3.35 M/UL — LOW (ref 4.2–5.8)
RBC # FLD: 15.1 % — HIGH (ref 10.3–14.5)
RBC # FLD: 15.4 % — HIGH (ref 10.3–14.5)
SODIUM SERPL-SCNC: 141 MMOL/L — SIGNIFICANT CHANGE UP (ref 135–145)
WBC # BLD: 7.96 K/UL — SIGNIFICANT CHANGE UP (ref 3.8–10.5)
WBC # BLD: 8.99 K/UL — SIGNIFICANT CHANGE UP (ref 3.8–10.5)
WBC # FLD AUTO: 7.96 K/UL — SIGNIFICANT CHANGE UP (ref 3.8–10.5)
WBC # FLD AUTO: 8.99 K/UL — SIGNIFICANT CHANGE UP (ref 3.8–10.5)

## 2021-01-30 PROCEDURE — 99233 SBSQ HOSP IP/OBS HIGH 50: CPT | Mod: GC

## 2021-01-30 RX ADMIN — Medication 1 DROP(S): at 23:31

## 2021-01-30 RX ADMIN — Medication 1 DROP(S): at 19:29

## 2021-01-30 RX ADMIN — LATANOPROST 1 DROP(S): 0.05 SOLUTION/ DROPS OPHTHALMIC; TOPICAL at 20:42

## 2021-01-30 RX ADMIN — PANTOPRAZOLE SODIUM 40 MILLIGRAM(S): 20 TABLET, DELAYED RELEASE ORAL at 19:29

## 2021-01-30 RX ADMIN — Medication 1 DROP(S): at 05:15

## 2021-01-30 RX ADMIN — Medication 1 DROP(S): at 11:39

## 2021-01-30 RX ADMIN — Medication 1 DROP(S): at 11:40

## 2021-01-30 RX ADMIN — PANTOPRAZOLE SODIUM 40 MILLIGRAM(S): 20 TABLET, DELAYED RELEASE ORAL at 05:56

## 2021-01-30 RX ADMIN — Medication 1 DROP(S): at 00:00

## 2021-01-30 NOTE — PROGRESS NOTE ADULT - ASSESSMENT
87 y/o M pmh HTN, COPD, GERALD (on CPAP) Diverticulosis, Hemorrhoids s/p hemorrhoidectomy (2018), Glaucoma, Aortic Root Aneurysm, L Inguinal hernia p/w blood per rectum.    Diverticular bleed- resolved  colonoscopy performed on 1/25 showed old heme, with evidence of diverticular disease of left and right colon, but no active bleeding  Patient rebleed 1/27, with drop in hemoglobin. CTA on 1/27 showed no evidence of active bleed  Since then, patient remains hemodynamically stable, with no further evidence of bleeding  can advance diet as tolerated  will sign off. call back PRN      Recommendations discussed with primary team  Plan discussed with GI service attending    Franki Galaviz MD  PGY-4 GI fellow  Pager: [phone]

## 2021-01-30 NOTE — PROGRESS NOTE ADULT - SUBJECTIVE AND OBJECTIVE BOX
GASTROENTEROLOGY PROGRESS NOTE  Patient seen and examined at bedside.    PERTINENT REVIEW OF SYSTEMS:  As noted above    Allergies    No Known Allergies    Intolerances      MEDICATIONS:  MEDICATIONS  (STANDING):  glucagon  Injectable 1 milliGRAM(s) IntraMuscular once  latanoprost 0.005% Ophthalmic Solution 1 Drop(s) Both EYES at bedtime  pantoprazole  Injectable 40 milliGRAM(s) IV Push two times a day  pilocarpine 4% Solution 1 Drop(s) Both EYES four times a day  timolol 0.5% Solution 1 Drop(s) Both EYES daily    MEDICATIONS  (PRN):    Vital Signs Last 24 Hrs  T(C): 36.8 (30 Jan 2021 17:34), Max: 37.6 (29 Jan 2021 19:52)  T(F): 98.3 (30 Jan 2021 17:34), Max: 99.6 (29 Jan 2021 19:52)  HR: 76 (30 Jan 2021 15:20) (71 - 100)  BP: 141/67 (30 Jan 2021 14:21) (97/55 - 146/71)  BP(mean): 97 (30 Jan 2021 14:21) (70 - 100)  RR: 14 (30 Jan 2021 15:20) (13 - 26)  SpO2: 100% (30 Jan 2021 15:20) (96% - 100%)    01-29 @ 07:01 - 01-30 @ 07:00  --------------------------------------------------------  IN: 200 mL / OUT: 2560 mL / NET: -2360 mL    01-30 @ 07:01 - 01-30 @ 18:45  --------------------------------------------------------  IN: 0 mL / OUT: 600 mL / NET: -600 mL      PHYSICAL EXAM:    General: Well developed; well nourished; in no acute distress  HEENT: MMM, conjunctiva and sclera clear  Gastrointestinal: Soft non-tender non-distended; Normal bowel sounds; No hepatosplenomegaly. No rebound or guarding  Skin: Warm and dry. No obvious rash    LABS:                        10.2   8.99  )-----------( 145      ( 30 Jan 2021 16:51 )             32.5     01-30    141  |  103  |  11  ----------------------------<  107<H>  3.8   |  30  |  0.75    Ca    8.6      30 Jan 2021 04:46  Phos  3.2     01-30  Mg     2.0     01-30      PT/INR - ( 30 Jan 2021 04:36 )   PT: 11.3 sec;   INR: 0.94          PTT - ( 30 Jan 2021 04:36 )  PTT:27.1 sec                  RADIOLOGY & ADDITIONAL STUDIES:  Reviewed

## 2021-01-30 NOTE — PROGRESS NOTE ADULT - ASSESSMENT
85 yo M w/ PMH of HTN, COPD, GERALD (on CPAP) Diverticulosis, Hemorrhoids s/p hemorrhoidectomy (2018), Glaucoma, Aortic Root Aneurysm, L Inguinal hernia p/w blood per rectum. Afebrile, HD stable,  WBC 13.12 H/H 11.3/36.0, K 6.1, Cr 1.09. Presentation due to diverticulosis vs hemorrhoids. Now s/p Colonoscopy (1/25).    Stepdown to telemetry today  LRD  Continue CBC Q6 hours  PT  Primary care per SICU team

## 2021-01-30 NOTE — PROGRESS NOTE ADULT - SUBJECTIVE AND OBJECTIVE BOX
OVERNIGHT EVENTS: hgb 9.6 at 10pm, Had dark BM x1 yesterday, HD stable. Tolerated diet    STATUS POST:    1/25: s/p Colonoscopy- diverticulosis w/o active bleed      SUBJECTIVE: Pt seen and examined at bedside this am by surgery team. Reporting no acute complaints. Appears comfortable, sitting up to chair. Denies bloody BMs. Tolerating diet. Denies dizziness, f/n/v/cp/sob.    MEDICATIONS  (STANDING):  glucagon  Injectable 1 milliGRAM(s) IntraMuscular once  latanoprost 0.005% Ophthalmic Solution 1 Drop(s) Both EYES at bedtime  pantoprazole  Injectable 40 milliGRAM(s) IV Push two times a day  pilocarpine 4% Solution 1 Drop(s) Both EYES four times a day  timolol 0.5% Solution 1 Drop(s) Both EYES daily    MEDICATIONS  (PRN):      Vital Signs Last 24 Hrs  T(C): 37.2 (30 Jan 2021 14:00), Max: 37.6 (29 Jan 2021 18:37)  T(F): 98.9 (30 Jan 2021 14:00), Max: 99.6 (29 Jan 2021 18:37)  HR: 76 (30 Jan 2021 15:20) (71 - 100)  BP: 141/67 (30 Jan 2021 14:21) (96/58 - 146/71)  BP(mean): 97 (30 Jan 2021 14:21) (70 - 100)  RR: 14 (30 Jan 2021 15:20) (13 - 26)  SpO2: 100% (30 Jan 2021 15:20) (96% - 100%)    PHYSICAL EXAM:    Constitutional: A&Ox3, NAD    Respiratory: non labored breathing, no respiratory distress    Cardiovascular: NSR, RRR    Gastrointestinal: soft, NT/ND, no rebound tenderness, guarding, rigidity    Extremities: wwp, no calf tenderness or edema. SCDs in place         I&O's Detail    29 Jan 2021 07:01  -  30 Jan 2021 07:00  --------------------------------------------------------  IN:    Oral Fluid: 200 mL  Total IN: 200 mL    OUT:    Voided (mL): 2560 mL  Total OUT: 2560 mL    Total NET: -2360 mL      30 Jan 2021 07:01  -  30 Jan 2021 16:03  --------------------------------------------------------  IN:  Total IN: 0 mL    OUT:    Voided (mL): 600 mL  Total OUT: 600 mL    Total NET: -600 mL          LABS:                        9.2    7.96  )-----------( 151      ( 30 Jan 2021 04:36 )             29.0     01-30    141  |  103  |  11  ----------------------------<  107<H>  3.8   |  30  |  0.75    Ca    8.6      30 Jan 2021 04:46  Phos  3.2     01-30  Mg     2.0     01-30      PT/INR - ( 30 Jan 2021 04:36 )   PT: 11.3 sec;   INR: 0.94          PTT - ( 30 Jan 2021 04:36 )  PTT:27.1 sec      RADIOLOGY & ADDITIONAL STUDIES:

## 2021-01-30 NOTE — CHART NOTE - NSCHARTNOTEFT_GEN_A_CORE
colonoscopy report     Procedure:   The scope was advanced to the cecum. The appendiceal orifice and ileocecal valve were identified. Bile was seen coming from the IC valve. Old heme was seen throughout the colon, which was suctioned and cleaned. Non bleeding diverticulosis was seen on the left and right colon. No active bleeding identified. Retroflexion revealed internal hemorrhoid.      Impressions: No active bleeding seen- likely a diverticular bleed that has stopped.       Plan: Please monitor hemoglobin, vitals, and monitor for signs of active bleeding? ?
Patient seen and examined with house-staff during bedside rounds.  Resident note read, including vitals, physical findings, laboratory data, and radiological reports.   Revisions included below.  Direct personal management at bed side and extensive interpretation of the data.  Plan was outlined and discussed in details with the housestaff.  Decision making of high complexity  Action taken for acute disease activity to reflect the level of care provided:  - medication reconciliation  - review laboratory data  Patient is  well-known to me.  Events were noted noticed.  Patient has a lower GI bleed.  CT angiogram was negative.  No further bleeding.  Hemoglobin is stable.  Patient tolerating p.o.  COPD is stable no no evidence of acute exacerbation.  Patient is out of bed in chair.  SCDs.  Will discuss with surgical team to transfer to intermediate care unit

## 2021-01-30 NOTE — PROGRESS NOTE ADULT - SUBJECTIVE AND OBJECTIVE BOX
24 Hour Events: Had dark BM x1 yesterday during day time, hemodynamically stable and asymptomatic repeat CBC sent and stable from AM lab work. Tolerated a diet, ambulated in hallway.     PAST MEDICAL & SURGICAL HISTORY:  COPD (chronic obstructive pulmonary disease)  Sleep apnea  Hernia  Hemorrhoids  Hypertension  S/P hemorrhoidectomy      Allergies  No Known Allergies    MEDICATIONS  (STANDING):  glucagon  Injectable 1 milliGRAM(s) IntraMuscular once  latanoprost 0.005% Ophthalmic Solution 1 Drop(s) Both EYES at bedtime  pantoprazole  Injectable 40 milliGRAM(s) IV Push two times a day  pilocarpine 4% Solution 1 Drop(s) Both EYES four times a day  timolol 0.5% Solution 1 Drop(s) Both EYES daily      Physical Exam:   General: Well appearing male, sitting comfortably in chair   Neuro: Grossly intact bilaterally   HEENT: Normocephalic, atraumatic, trachea midline, no JVD  Heart: Regular S1/S2, no murmurs rubs or gallops   Lungs: Unlabored breathing on RA; Clear to auscultation bilaterally, no adventitious sounds   Abdomen: Soft, non-distended, normoactive bowel sounds throughout, no tenderness to palpation in all 4 quadrants   Upper Extremities: No edema    Lower Extremities: No edema, feet warm bilaterally   Skin: Warm, non-diaphoretic throughout       Labs:              9.2    7.96  )-----------( 151      ( 30 Jan 2021 04:36 )             29.0     01-30    141  |  103  |  11  ----------------------------<  107<H>  3.8   |  30  |  0.75    Ca    8.6      30 Jan 2021 04:46  Phos  3.2     01-30  Mg     2.0     01-30      CAPILLARY BLOOD GLUCOSE  POCT Blood Glucose.: 127 mg/dL (29 Jan 2021 21:24)  POCT Blood Glucose.: 121 mg/dL (29 Jan 2021 16:27)  POCT Blood Glucose.: 88 mg/dL (29 Jan 2021 11:03)        PT/INR - ( 30 Jan 2021 04:36 )   PT: 11.3 sec;   INR: 0.94     PTT - ( 30 Jan 2021 04:36 )  PTT:27.1 sec    COVID-19 PCR: NotDetec (24 Jan 2021 23:48)      Vital Signs:   Vital Signs Last 24 Hrs  T(C): 36.9 (30 Jan 2021 10:00), Max: 37.6 (29 Jan 2021 18:37)  T(F): 98.4 (30 Jan 2021 10:00), Max: 99.6 (29 Jan 2021 18:37)  HR: 82 (30 Jan 2021 09:00) (70 - 93)  BP: 116/81 (30 Jan 2021 09:00) (96/58 - 140/66)  BP(mean): 93 (30 Jan 2021 09:00) (70 - 95)  RR: 23 (30 Jan 2021 09:00) (14 - 26)  SpO2: 100% (30 Jan 2021 09:00) (97% - 100%)      Input/Output:   I&O's Detail  29 Jan 2021 07:01  -  30 Jan 2021 07:00  --------------------------------------------------------  IN:    Oral Fluid: 200 mL  Total IN: 200 mL    OUT:    Voided (mL): 2560 mL  Total OUT: 2560 mL    Total NET: -2360 mL      30 Jan 2021 07:01  -  30 Jan 2021 10:14  --------------------------------------------------------  IN:  Total IN: 0 mL    OUT:    Voided (mL): 300 mL  Total OUT: 300 mL    Total NET: -300 mL        Daily     Daily

## 2021-01-30 NOTE — PROGRESS NOTE ADULT - ASSESSMENT
Assessment: 85 y/o M with past medical history significant for HTN, COPD, GERADL (on CPAP), diverticulosis, hemorrhoids (s/p hemorrhoidectomy 2018), glaucoma, aortic root aneurysm.     Plan:   Neuro: PRN Tylenol for pain/fever   HEENT: Continue Pilocarpine and Timolol eye drops   CV: Maintain MAP > 65; Continue to hold home Amlodipine 5mg QD, Metoprolol 12.5mg BID due to low blood pressure; Continue to hold ASA 81mg   Pulm: Oxygen saturation > 94% on RA   GI/FEN: Continue low fiber diet; Monitor bowel function closely; PPI BID   : Voids; Strict I&O's   Endo: Discontinue POCT glucose/SSI   Heme: Continue to trend CBC q6h; Next CBC at 12pm   ID: No active issues   PPX: SCDs   L/D/T: PIVs   PT/OT: Ordered   Dispo: Transfer to telemetry          Assessment: 85 y/o M with past medical history significant for HTN, COPD, GERALD (on CPAP), diverticulosis, hemorrhoids (s/p hemorrhoidectomy 2018), glaucoma, aortic root aneurysm. Presented to [facility] ED on 1/25/21 with LGIB, underwent colonoscopy which revealed diverticulosis without evidence of bleed. Transferred to telemetry, however had a drop and hemoglobin and was transferred to back to SICU on 1/27/21. He received a total of 2u RBC, last transfusion was on 1/27/21, has remained hemodynamically stable since. Was up for transfer to telemetry yesterday, due to 1 dark BM yesterday, however was asymptomatic and without change in vital sign, stable hemoglobin. Plan for transfer to telemetry today.     Plan:   Neuro: PRN Tylenol for pain/fever   HEENT: Continue Pilocarpine and Timolol eye drops   CV: Maintain MAP > 65; Continue to hold home Amlodipine 5mg QD, Metoprolol 12.5mg BID due to low blood pressure; Continue to hold ASA 81mg   Pulm: Oxygen saturation > 94% on RA   GI/FEN: Continue low fiber diet; Monitor bowel function closely; PPI BID   : Voids; Strict I&O's   Endo: Discontinue POCT glucose/SSI   Heme: Continue to trend CBC q6h; Next CBC at 12pm   ID: No active issues   PPX: SCDs   L/D/T: PIVs   PT/OT: Ordered   Dispo: Transfer to telemetry

## 2021-01-31 LAB
ANION GAP SERPL CALC-SCNC: 8 MMOL/L — SIGNIFICANT CHANGE UP (ref 5–17)
BUN SERPL-MCNC: 10 MG/DL — SIGNIFICANT CHANGE UP (ref 7–23)
CALCIUM SERPL-MCNC: 8.8 MG/DL — SIGNIFICANT CHANGE UP (ref 8.4–10.5)
CHLORIDE SERPL-SCNC: 103 MMOL/L — SIGNIFICANT CHANGE UP (ref 96–108)
CO2 SERPL-SCNC: 29 MMOL/L — SIGNIFICANT CHANGE UP (ref 22–31)
CREAT SERPL-MCNC: 0.85 MG/DL — SIGNIFICANT CHANGE UP (ref 0.5–1.3)
GLUCOSE SERPL-MCNC: 107 MG/DL — HIGH (ref 70–99)
HCT VFR BLD CALC: 29.3 % — LOW (ref 39–50)
HGB BLD-MCNC: 9.1 G/DL — LOW (ref 13–17)
MAGNESIUM SERPL-MCNC: 2 MG/DL — SIGNIFICANT CHANGE UP (ref 1.6–2.6)
MCHC RBC-ENTMCNC: 30.1 PG — SIGNIFICANT CHANGE UP (ref 27–34)
MCHC RBC-ENTMCNC: 31.1 GM/DL — LOW (ref 32–36)
MCV RBC AUTO: 97 FL — SIGNIFICANT CHANGE UP (ref 80–100)
NRBC # BLD: 0 /100 WBCS — SIGNIFICANT CHANGE UP (ref 0–0)
PHOSPHATE SERPL-MCNC: 3.3 MG/DL — SIGNIFICANT CHANGE UP (ref 2.5–4.5)
PLATELET # BLD AUTO: 143 K/UL — LOW (ref 150–400)
POTASSIUM SERPL-MCNC: 4.3 MMOL/L — SIGNIFICANT CHANGE UP (ref 3.5–5.3)
POTASSIUM SERPL-SCNC: 4.3 MMOL/L — SIGNIFICANT CHANGE UP (ref 3.5–5.3)
RBC # BLD: 3.02 M/UL — LOW (ref 4.2–5.8)
RBC # FLD: 15.4 % — HIGH (ref 10.3–14.5)
SODIUM SERPL-SCNC: 140 MMOL/L — SIGNIFICANT CHANGE UP (ref 135–145)
WBC # BLD: 9.12 K/UL — SIGNIFICANT CHANGE UP (ref 3.8–10.5)
WBC # FLD AUTO: 9.12 K/UL — SIGNIFICANT CHANGE UP (ref 3.8–10.5)

## 2021-01-31 RX ADMIN — LATANOPROST 1 DROP(S): 0.05 SOLUTION/ DROPS OPHTHALMIC; TOPICAL at 21:35

## 2021-01-31 RX ADMIN — Medication 1 DROP(S): at 12:03

## 2021-01-31 RX ADMIN — PANTOPRAZOLE SODIUM 40 MILLIGRAM(S): 20 TABLET, DELAYED RELEASE ORAL at 18:05

## 2021-01-31 RX ADMIN — Medication 1 DROP(S): at 23:19

## 2021-01-31 RX ADMIN — Medication 1 DROP(S): at 05:33

## 2021-01-31 RX ADMIN — Medication 1 DROP(S): at 18:04

## 2021-01-31 RX ADMIN — PANTOPRAZOLE SODIUM 40 MILLIGRAM(S): 20 TABLET, DELAYED RELEASE ORAL at 05:32

## 2021-01-31 NOTE — PHYSICAL THERAPY INITIAL EVALUATION ADULT - PERTINENT HX OF CURRENT PROBLEM, REHAB EVAL
86 year old  male Presented to [facility] ED on 1/25/21 with LGIB, underwent colonoscopy which revealed diverticulosis without evidence of bleed. Transferred to telemetry, however had a drop and hemoglobin and was transferred to back to SICU on 1/27/21. He received a total of 2u RBC, last transfusion was on 1/27/21, has remained hemodynamically stable since.

## 2021-01-31 NOTE — PROGRESS NOTE ADULT - SUBJECTIVE AND OBJECTIVE BOX
STATUS POST:  1/25: s/p Colonoscopy- diverticulosis w/o active bleed       SUBJECTIVE: Patient seen and examined bedside by chief resident.  bowel movement this AM with some red streaks of blood. denies abdominal pain or chest pain.         Vital Signs Last 24 Hrs  T(C): 36.8 (31 Jan 2021 05:00), Max: 37.2 (30 Jan 2021 14:00)  T(F): 98.3 (31 Jan 2021 05:00), Max: 98.9 (30 Jan 2021 14:00)  HR: 98 (31 Jan 2021 08:59) (76 - 112)  BP: 126/68 (31 Jan 2021 08:59) (109/59 - 156/69)  BP(mean): 92 (31 Jan 2021 08:59) (78 - 103)  RR: 17 (31 Jan 2021 08:59) (13 - 21)  SpO2: 100% (31 Jan 2021 08:59) (95% - 100%)  I&O's Detail    30 Jan 2021 07:01  -  31 Jan 2021 07:00  --------------------------------------------------------  IN:    Oral Fluid: 480 mL  Total IN: 480 mL    OUT:    Voided (mL): 2700 mL  Total OUT: 2700 mL    Total NET: -2220 mL          General: NAD, resting comfortably in bed  C/V: NSR  Pulm: Nonlabored breathing, no respiratory distress  Abd: soft, NT/ND.  Extrem: WWP, no edema, SCDs in place        LABS:                        9.1    9.12  )-----------( 143      ( 31 Jan 2021 05:51 )             29.3     01-31    140  |  103  |  10  ----------------------------<  107<H>  4.3   |  29  |  0.85    Ca    8.8      31 Jan 2021 05:51  Phos  3.3     01-31  Mg     2.0     01-31      PT/INR - ( 30 Jan 2021 04:36 )   PT: 11.3 sec;   INR: 0.94          PTT - ( 30 Jan 2021 04:36 )  PTT:27.1 sec      RADIOLOGY & ADDITIONAL STUDIES:

## 2021-01-31 NOTE — PHYSICAL THERAPY INITIAL EVALUATION ADULT - ADDITIONAL COMMENTS
Pt. will be staying with daughter upon discharge in elevator access building. Pt. denies use of AD or DME.

## 2021-01-31 NOTE — PROGRESS NOTE ADULT - ASSESSMENT
87 y/o M pmh HTN, COPD, GERALD (on CPAP) Diverticulosis, Hemorrhoids s/p hemorrhoidectomy (2018), diverticulosis, Glaucoma, Aortic Root Aneurysm, L Inguinal hernia a/w LGIB.     NEURO: no issues  HEENT: timolol, latanoprost, pilocarpine  CV: stable  PULM: room air, CPAP at night  GI/FEN: LFD  : voids  ENDO: ISS  ID: no issues  PPX: SCDs, no SQH due to GIB  LINES: PIVs,   WOUNDS/DRAINS: none

## 2021-02-01 LAB
ANION GAP SERPL CALC-SCNC: 10 MMOL/L — SIGNIFICANT CHANGE UP (ref 5–17)
BUN SERPL-MCNC: 13 MG/DL — SIGNIFICANT CHANGE UP (ref 7–23)
CALCIUM SERPL-MCNC: 8.9 MG/DL — SIGNIFICANT CHANGE UP (ref 8.4–10.5)
CHLORIDE SERPL-SCNC: 103 MMOL/L — SIGNIFICANT CHANGE UP (ref 96–108)
CO2 SERPL-SCNC: 28 MMOL/L — SIGNIFICANT CHANGE UP (ref 22–31)
CREAT SERPL-MCNC: 0.82 MG/DL — SIGNIFICANT CHANGE UP (ref 0.5–1.3)
GLUCOSE SERPL-MCNC: 96 MG/DL — SIGNIFICANT CHANGE UP (ref 70–99)
HCT VFR BLD CALC: 29 % — LOW (ref 39–50)
HGB BLD-MCNC: 9.1 G/DL — LOW (ref 13–17)
MAGNESIUM SERPL-MCNC: 2 MG/DL — SIGNIFICANT CHANGE UP (ref 1.6–2.6)
MCHC RBC-ENTMCNC: 30 PG — SIGNIFICANT CHANGE UP (ref 27–34)
MCHC RBC-ENTMCNC: 31.4 GM/DL — LOW (ref 32–36)
MCV RBC AUTO: 95.7 FL — SIGNIFICANT CHANGE UP (ref 80–100)
NRBC # BLD: 0 /100 WBCS — SIGNIFICANT CHANGE UP (ref 0–0)
PHOSPHATE SERPL-MCNC: 3.5 MG/DL — SIGNIFICANT CHANGE UP (ref 2.5–4.5)
PLATELET # BLD AUTO: 139 K/UL — LOW (ref 150–400)
POTASSIUM SERPL-MCNC: 4 MMOL/L — SIGNIFICANT CHANGE UP (ref 3.5–5.3)
POTASSIUM SERPL-SCNC: 4 MMOL/L — SIGNIFICANT CHANGE UP (ref 3.5–5.3)
RBC # BLD: 3.03 M/UL — LOW (ref 4.2–5.8)
RBC # FLD: 15.1 % — HIGH (ref 10.3–14.5)
SODIUM SERPL-SCNC: 141 MMOL/L — SIGNIFICANT CHANGE UP (ref 135–145)
WBC # BLD: 9.07 K/UL — SIGNIFICANT CHANGE UP (ref 3.8–10.5)
WBC # FLD AUTO: 9.07 K/UL — SIGNIFICANT CHANGE UP (ref 3.8–10.5)

## 2021-02-01 RX ORDER — METOPROLOL TARTRATE 50 MG
12.5 TABLET ORAL
Refills: 0 | Status: DISCONTINUED | OUTPATIENT
Start: 2021-02-01 | End: 2021-02-02

## 2021-02-01 RX ADMIN — PANTOPRAZOLE SODIUM 40 MILLIGRAM(S): 20 TABLET, DELAYED RELEASE ORAL at 17:27

## 2021-02-01 RX ADMIN — Medication 1 DROP(S): at 12:36

## 2021-02-01 RX ADMIN — Medication 1 DROP(S): at 05:00

## 2021-02-01 RX ADMIN — PANTOPRAZOLE SODIUM 40 MILLIGRAM(S): 20 TABLET, DELAYED RELEASE ORAL at 05:00

## 2021-02-01 RX ADMIN — Medication 1 DROP(S): at 23:18

## 2021-02-01 RX ADMIN — Medication 12.5 MILLIGRAM(S): at 17:27

## 2021-02-01 RX ADMIN — LATANOPROST 1 DROP(S): 0.05 SOLUTION/ DROPS OPHTHALMIC; TOPICAL at 23:19

## 2021-02-01 RX ADMIN — Medication 1 DROP(S): at 17:27

## 2021-02-01 NOTE — PROGRESS NOTE ADULT - SUBJECTIVE AND OBJECTIVE BOX
SUBJECTIVE: Patient seen and examined bedside by chief resident, feels well, no acute complains. No Bloody or normal BMs overnight.       MEDICATIONS  (PRN):      I&O's Detail    30 Jan 2021 07:01  -  31 Jan 2021 07:00  --------------------------------------------------------  IN:    Oral Fluid: 480 mL  Total IN: 480 mL    OUT:    Voided (mL): 2700 mL  Total OUT: 2700 mL    Total NET: -2220 mL      31 Jan 2021 07:01  -  01 Feb 2021 06:52  --------------------------------------------------------  IN:    Oral Fluid: 720 mL  Total IN: 720 mL    OUT:    Voided (mL): 3000 mL  Total OUT: 3000 mL    Total NET: -2280 mL          Vital Signs Last 24 Hrs  T(C): 36.5 (01 Feb 2021 05:00), Max: 37.1 (31 Jan 2021 21:07)  T(F): 97.7 (01 Feb 2021 05:00), Max: 98.8 (31 Jan 2021 21:07)  HR: 78 (01 Feb 2021 05:49) (72 - 98)  BP: 111/57 (01 Feb 2021 03:45) (111/57 - 160/76)  BP(mean): 78 (01 Feb 2021 03:45) (78 - 109)  RR: 20 (01 Feb 2021 05:49) (16 - 20)  SpO2: 96% (01 Feb 2021 05:49) (93% - 100%)    General: NAD, resting comfortably in bed  C/V: Regular rate  Pulm: Nonlabored breathing, no respiratory distress  Abd: soft, ND, NT  Extrem: WWP, no edema, SCDs in place    LABS:                        9.1    9.07  )-----------( 139      ( 01 Feb 2021 05:54 )             29.0     02-01    141  |  103  |  13  ----------------------------<  96  4.0   |  28  |  0.82    Ca    8.9      01 Feb 2021 05:54  Phos  3.5     02-01  Mg     2.0     02-01            RADIOLOGY & ADDITIONAL STUDIES:

## 2021-02-01 NOTE — PROGRESS NOTE ADULT - ASSESSMENT
87 y/o M pmh HTN, COPD, GERALD (on CPAP) Diverticulosis, Hemorrhoids s/p hemorrhoidectomy (2018), diverticulosis, Glaucoma, Aortic Root Aneurysm, L Inguinal hernia a/w LGIB with Hb stable since yesterday    NEURO: no issues  HEENT: timolol, latanoprost, pilocarpine, restart metoprolol  CV: stable  PULM: room air, CPAP at night  GI/FEN: LFD  : voids  ENDO: ISS  ID: no issues  PPX: SCDs, no SQH due to GIB  LINES: PIVs,   WOUNDS/DRAINS: none

## 2021-02-02 ENCOUNTER — TRANSCRIPTION ENCOUNTER (OUTPATIENT)
Age: 86
End: 2021-02-02

## 2021-02-02 VITALS — HEART RATE: 91 BPM | OXYGEN SATURATION: 100 % | RESPIRATION RATE: 18 BRPM

## 2021-02-02 LAB
ANION GAP SERPL CALC-SCNC: 8 MMOL/L — SIGNIFICANT CHANGE UP (ref 5–17)
BUN SERPL-MCNC: 13 MG/DL — SIGNIFICANT CHANGE UP (ref 7–23)
CALCIUM SERPL-MCNC: 8.9 MG/DL — SIGNIFICANT CHANGE UP (ref 8.4–10.5)
CHLORIDE SERPL-SCNC: 101 MMOL/L — SIGNIFICANT CHANGE UP (ref 96–108)
CO2 SERPL-SCNC: 30 MMOL/L — SIGNIFICANT CHANGE UP (ref 22–31)
CREAT SERPL-MCNC: 0.84 MG/DL — SIGNIFICANT CHANGE UP (ref 0.5–1.3)
GLUCOSE SERPL-MCNC: 96 MG/DL — SIGNIFICANT CHANGE UP (ref 70–99)
HCT VFR BLD CALC: 29.3 % — LOW (ref 39–50)
HGB BLD-MCNC: 9.2 G/DL — LOW (ref 13–17)
MAGNESIUM SERPL-MCNC: 2.2 MG/DL — SIGNIFICANT CHANGE UP (ref 1.6–2.6)
MCHC RBC-ENTMCNC: 30.7 PG — SIGNIFICANT CHANGE UP (ref 27–34)
MCHC RBC-ENTMCNC: 31.4 GM/DL — LOW (ref 32–36)
MCV RBC AUTO: 97.7 FL — SIGNIFICANT CHANGE UP (ref 80–100)
NRBC # BLD: 0 /100 WBCS — SIGNIFICANT CHANGE UP (ref 0–0)
PHOSPHATE SERPL-MCNC: 3.7 MG/DL — SIGNIFICANT CHANGE UP (ref 2.5–4.5)
PLATELET # BLD AUTO: 150 K/UL — SIGNIFICANT CHANGE UP (ref 150–400)
POTASSIUM SERPL-MCNC: 4 MMOL/L — SIGNIFICANT CHANGE UP (ref 3.5–5.3)
POTASSIUM SERPL-SCNC: 4 MMOL/L — SIGNIFICANT CHANGE UP (ref 3.5–5.3)
RBC # BLD: 3 M/UL — LOW (ref 4.2–5.8)
RBC # FLD: 15.2 % — HIGH (ref 10.3–14.5)
SODIUM SERPL-SCNC: 139 MMOL/L — SIGNIFICANT CHANGE UP (ref 135–145)
WBC # BLD: 8.6 K/UL — SIGNIFICANT CHANGE UP (ref 3.8–10.5)
WBC # FLD AUTO: 8.6 K/UL — SIGNIFICANT CHANGE UP (ref 3.8–10.5)

## 2021-02-02 PROCEDURE — 36415 COLL VENOUS BLD VENIPUNCTURE: CPT

## 2021-02-02 PROCEDURE — 85610 PROTHROMBIN TIME: CPT

## 2021-02-02 PROCEDURE — 85027 COMPLETE CBC AUTOMATED: CPT

## 2021-02-02 PROCEDURE — 86900 BLOOD TYPING SEROLOGIC ABO: CPT

## 2021-02-02 PROCEDURE — 84295 ASSAY OF SERUM SODIUM: CPT

## 2021-02-02 PROCEDURE — 84100 ASSAY OF PHOSPHORUS: CPT

## 2021-02-02 PROCEDURE — 93005 ELECTROCARDIOGRAM TRACING: CPT

## 2021-02-02 PROCEDURE — U0003: CPT

## 2021-02-02 PROCEDURE — 86901 BLOOD TYPING SEROLOGIC RH(D): CPT

## 2021-02-02 PROCEDURE — 83735 ASSAY OF MAGNESIUM: CPT

## 2021-02-02 PROCEDURE — 96374 THER/PROPH/DIAG INJ IV PUSH: CPT

## 2021-02-02 PROCEDURE — 74174 CTA ABD&PLVS W/CONTRAST: CPT

## 2021-02-02 PROCEDURE — U0005: CPT

## 2021-02-02 PROCEDURE — 84132 ASSAY OF SERUM POTASSIUM: CPT

## 2021-02-02 PROCEDURE — 85730 THROMBOPLASTIN TIME PARTIAL: CPT

## 2021-02-02 PROCEDURE — 99285 EMERGENCY DEPT VISIT HI MDM: CPT | Mod: 25

## 2021-02-02 PROCEDURE — P9016: CPT

## 2021-02-02 PROCEDURE — 71045 X-RAY EXAM CHEST 1 VIEW: CPT

## 2021-02-02 PROCEDURE — 86923 COMPATIBILITY TEST ELECTRIC: CPT

## 2021-02-02 PROCEDURE — 82330 ASSAY OF CALCIUM: CPT

## 2021-02-02 PROCEDURE — 82962 GLUCOSE BLOOD TEST: CPT

## 2021-02-02 PROCEDURE — 83036 HEMOGLOBIN GLYCOSYLATED A1C: CPT

## 2021-02-02 PROCEDURE — 82803 BLOOD GASES ANY COMBINATION: CPT

## 2021-02-02 PROCEDURE — 80048 BASIC METABOLIC PNL TOTAL CA: CPT

## 2021-02-02 PROCEDURE — 86850 RBC ANTIBODY SCREEN: CPT

## 2021-02-02 PROCEDURE — 36430 TRANSFUSION BLD/BLD COMPNT: CPT

## 2021-02-02 PROCEDURE — 80053 COMPREHEN METABOLIC PANEL: CPT

## 2021-02-02 PROCEDURE — 85025 COMPLETE CBC W/AUTO DIFF WBC: CPT

## 2021-02-02 PROCEDURE — 97161 PT EVAL LOW COMPLEX 20 MIN: CPT

## 2021-02-02 RX ORDER — ASPIRIN/CALCIUM CARB/MAGNESIUM 324 MG
0 TABLET ORAL
Qty: 0 | Refills: 0 | DISCHARGE

## 2021-02-02 RX ADMIN — PANTOPRAZOLE SODIUM 40 MILLIGRAM(S): 20 TABLET, DELAYED RELEASE ORAL at 04:57

## 2021-02-02 RX ADMIN — Medication 1 DROP(S): at 04:58

## 2021-02-02 NOTE — PROGRESS NOTE ADULT - REASON FOR ADMISSION
GI bleed
LGI bleed

## 2021-02-02 NOTE — PROGRESS NOTE ADULT - SUBJECTIVE AND OBJECTIVE BOX
INTERVAL HPI/OVERNIGHT EVENTS: -dizziness/-CP/-SOB, no BMs overnight/+F. Eating well, -N/-V. JOSE, VSS  2/1: restarted Metoprolol, BMx1 with streak on tissue, VSS    SUBJECTIVE: Patient examined bedside with chief resident. Patient reports that he feels great and did not have another bloody bowel movement . Patient reports he is ambulating and using incentive spirometer. Patient denies SOB, chest pain, nausea and emesis. Patient making adequate urine output.    metoprolol tartrate 12.5 milliGRAM(s) Oral two times a day      Vital Signs Last 24 Hrs  T(C): 36.3 (02 Feb 2021 04:44), Max: 36.8 (01 Feb 2021 21:57)  T(F): 97.4 (02 Feb 2021 04:44), Max: 98.3 (01 Feb 2021 21:57)  HR: 74 (02 Feb 2021 04:17) (72 - 88)  BP: 107/56 (02 Feb 2021 04:17) (102/56 - 127/65)  BP(mean): 74 (02 Feb 2021 04:17) (74 - 90)  RR: 17 (02 Feb 2021 04:17) (17 - 18)  SpO2: 97% (02 Feb 2021 04:17) (96% - 99%)  I&O's Detail    01 Feb 2021 07:01  -  02 Feb 2021 07:00  --------------------------------------------------------  IN:    Oral Fluid: 660 mL  Total IN: 660 mL    OUT:    Voided (mL): 3350 mL  Total OUT: 3350 mL    Total NET: -2690 mL          General: NAD, resting comfortably in bed  C/V: NSR  Pulm: Nonlabored breathing, no respiratory distress  Abd: soft, NT/ND.  Extrem: WWP, no edema, SCDs in place        LABS:                        9.2    8.60  )-----------( 150      ( 02 Feb 2021 07:09 )             29.3     02-01    141  |  103  |  13  ----------------------------<  96  4.0   |  28  |  0.82    Ca    8.9      01 Feb 2021 05:54  Phos  3.5     02-01  Mg     2.0     02-01

## 2021-02-02 NOTE — DISCHARGE NOTE PROVIDER - HOSPITAL COURSE
O/N: -dizziness/-CP/-SOB, no BMs overnight/+F. Eating well, -N/-V. JOSE, VSS  2/1: restarted Metoprolol, BMx1 with streak on tissue, VSS  O/N: claudia diet, no bm, VSS  1/31: BM with streaks of red blood, AM h/h: 9.1/29.3, GI made aware of blood in stool, NTD, continue to monitor h/h and vitals  ON: Small BM today, no blood in toilet, not dark, tiny streaks of blood on toilet paper. tachy to low 100 righ after ambulating, OOBA, Denies SOB/Dizziness/palpitation/fatigue.   1/30: SDU, dark mucousy BM, HDS. 4pm CBC stable h/h 10.2/32.5, VSS. No bloody BMs today.   ON: hgb 9.6 at 10pm  1/29: Changed to low rest diet; ambulated OOB, tx to tele. Small dark bloody BM 2 PM- rpt Hb 11. Cont low residue diet. GI aware.   O/N: 8pm Hgb 10.2, Eye drops ordered. repeat Hgb @12: 9.3 repeat in am, good UO   1/28: cbc at 2pm stable (9.6), no BM's  CLD per primary.  O/N: 8pm Hgb 8.4( stable), PTT 22.5 INR 1.04 @2am: 7.7 given 1UPRBC HD stable f/u  repeat hgb @8.   1/27: Tx to SICU for 2x LGIB. Hb drop 8.3 to 6.4. s/p 1L bolus and 1u pRBC. post transfxn cbc 8.3. CTA negative for active bleed. Per GI, no colonoscopy as no active bleeding. BCLD, mIVF and Q6 CBC now. 2pm CBC hg 8.3.  ------------------ SICU ---------------------  O/N: 18:00 hgb 8.2 (7.6), claudia clears, 1 bloody bm, UOP: 1100cc, VSS   ------------------SDU---------------------  1/26: cbc at noon 7.6. CLD started. Had dark BM around noon, Step down to floor.  O/N: 6pm Hgb 6.8, post PRBC Hgb 7.9, no BMs  1/25: Total 5 episodes of GI bleed dark blood since admission. VSS. Hb 8.5. S/p colonoscopy with diverticulosis L and R colon. No active bleeding.   ----------------- SICU -------------------  1/25: Hgb 9 from 10.4, started Moviprep, SICU transfer ordered  O/N: Admitted to [facility], repeat K+ 5.3 (6.5), hgb: 10.4 (11.3), 1 episode of BRBPR @ 05:00, tachy to 104 this am   Konstantin Son is an 86 year-old male who presented with one episode of bright red blood in the toilet without stool at 9pm, with blood initially noticed on toilet paper prior to the episode, accompanied by transient light-headedness that has since resolved and no bowel movements since the episode. He denied abdominal pain, SOB, chest pain, fevers, chills, N/V, and continued to tolerate his diet. He was previously admitted in 05/2018 with bright red blood per rectum, during which time he received 3 units of PRBC. A colonoscopy revealed large diverticula with clots and active bleeding that stopped without intervention. He reported another episode in 10/2018 with noted thrombosed external hemorrhoids, for which a hemorrhoidectomy was performed in the office without any additional episodes.     His PMHx is also significant for HTN, COPD, obstructive sleep apnea (for which he is on CPAP), glaucoma, aortic root aneurysm, and a left inguinal hernia. He has no surgical history aside from the hemorrhoidectomy.     From 01/25/2021 - 01/26/2021 and 01/27/2021 - 01/30/2021, he was transferred to the SICU for lower GI bleeding. On the day of discharge, he had stable vital signs, was tolerating diet, voiding without assistance, and pain was controlled. He is to follow up in 2 weeks with Dr. Hernandez.     Konstantin Son is an 86 year-old with PMHx is also significant for HTN, COPD, obstructive sleep apnea (for which he is on CPAP), glaucoma, aortic root aneurysm, surgical history of hemorrhoidectomy.   male who presented with one episode of bright red blood per rectum, with blood initially noticed on toilet paper prior to the episode, accompanied by transient light-headedness. He was admitted to a telemetry bed, but he had many episodes of BRBPR due to which he was transferred to the SICU for close monitoring. We performed a colonoscopy which did show diverticula but no active bleed. He was transfused to a level of Hb above 8. He seemed to be stable on Hb monitoring, so was stepped down to the telemetry unit again, but due to 2 episodes of BRBPR his Hb dropped to 6.4 and he was transferred to the SICU again. He was given a unit of blood and a CT Angiogram which showed no active extravasation. On further monitoring, his Hb dropped again, after which he was given a unit of blood and he had an appropriate response. He was serially monitored and was deemed safe to be stepped down again and started on a low reside diet. He was hemodynamically stable for 2 days, and did not have bright red blood per rectum. he was ambulating and voiding on his own and his vitals were stable. He was deemed safe to be discharged home, with plan to follow up with his Primary Care Provider as soon as possible, and take Aspirin only after seeing your PCP as well as Dr. Hernandez in his office.

## 2021-02-02 NOTE — PROGRESS NOTE ADULT - PROVIDER SPECIALTY LIST ADULT
Bariatric Surgery
SICU
Bariatric Surgery
Gastroenterology
SICU
Surgery
Bariatric Surgery
Bariatric Surgery
Gastroenterology
Gastroenterology
SICU
Surgery
Gastroenterology
Gastroenterology
Surgery

## 2021-02-02 NOTE — DISCHARGE NOTE PROVIDER - NSDCCPCAREPLAN_GEN_ALL_CORE_FT
PRINCIPAL DISCHARGE DIAGNOSIS  Diagnosis: Hematochezia  Assessment and Plan of Treatment:       SECONDARY DISCHARGE DIAGNOSES  Diagnosis: Diverticulosis  Assessment and Plan of Treatment:      PRINCIPAL DISCHARGE DIAGNOSIS  Diagnosis: Hematochezia  Assessment and Plan of Treatment: Konstantin Son is an 86 year-old male who presented with one episode of bright red blood in the toilet without stool at 9pm, with blood initially noticed on toilet paper prior to the episode, accompanied by transient light-headedness that has since resolved and no bowel movements since the episode. He denied abdominal pain, SOB, chest pain, fevers, chills, N/V, and continued to tolerate his diet. He was previously admitted in 05/2018 with bright red blood per rectum, during which time he received 3 units of PRBC. A colonoscopy revealed large diverticula with clots and active bleeding that stopped without intervention. He reported another episode in 10/2018 with noted thrombosed external hemorrhoids, for which a hemorrhoidectomy was performed in the office without any additional episodes.   His PMHx is also significant for HTN, COPD, obstructive sleep apnea (for which he is on CPAP), glaucoma, aortic root aneurysm, and a left inguinal hernia. He has no surgical history aside from the hemorrhoidectomy.   From 01/25/2021 - 01/26/2021 and 01/27/2021 - 01/30/2021, he was transferred to the SICU for lower GI bleeding. On the day of discharge, he had stable vital signs, was tolerating diet, voiding without assistance, and pain was controlled. He is to follow up in 2 weeks with Dr. Hernandez.      SECONDARY DISCHARGE DIAGNOSES  Diagnosis: Diverticulosis  Assessment and Plan of Treatment:      PRINCIPAL DISCHARGE DIAGNOSIS  Diagnosis: Hematochezia  Assessment and Plan of Treatment: s/p Transfusion, Colonoscopy with no active bleed, CTA with no extrav      SECONDARY DISCHARGE DIAGNOSES  Diagnosis: Diverticulosis  Assessment and Plan of Treatment:

## 2021-02-02 NOTE — DISCHARGE NOTE PROVIDER - CARE PROVIDER_API CALL
Jackson Hernandez)  Surgery  [address] [place] [address]  Phone: [phone]  Fax: [phone]  Established Patient  Follow Up Time: 1 week   Jackson Hernandez)  Surgery  [address] [place] [address]  Phone: [phone]  Fax: [phone]  Established Patient  Follow Up Time: 2 weeks

## 2021-02-02 NOTE — PROGRESS NOTE ADULT - ASSESSMENT
87 y/o M pmh HTN, COPD, GERALD (on CPAP) Diverticulosis, Hemorrhoids s/p hemorrhoidectomy (2018), diverticulosis, Glaucoma, Aortic Root Aneurysm, L Inguinal hernia a/w LGIB.     NEURO: no issues  HEENT: timolol, latanoprost, pilocarpine  CV: stable  PULM: room air, CPAP at night  GI/FEN: LFD  : voids  ENDO: ISS  ID: no issues  PPX: SCDs, no SQH due to GIB  LINES: PIVs,   WOUNDS/DRAINS: none  Discharge home

## 2021-02-02 NOTE — DISCHARGE NOTE PROVIDER - NSDCMRMEDTOKEN_GEN_ALL_CORE_FT
amLODIPine: 5 milligram(s) orally once a day  latanoprost 0.005% ophthalmic solution: 1 drop(s) to each affected eye once a day (in the evening)  metoprolol: 12.5 milligram(s) orally 2 times a day  pilocarpine nitrate 4% ophthalmic solution:   Protonix 40 mg oral delayed release tablet: 1 tab(s) orally once a day MDD:1  timolol hemihydrate 0.5% ophthalmic solution: 1 drop(s) to each affected eye 2 times a day

## 2021-02-02 NOTE — DISCHARGE NOTE PROVIDER - PROVIDER TOKENS
PROVIDER:[TOKEN:[[id number]],FOLLOWUP:[1 week],ESTABLISHEDPATIENT:[T]] PROVIDER:[TOKEN:[[id number]],FOLLOWUP:[2 weeks],ESTABLISHEDPATIENT:[T]]

## 2021-02-02 NOTE — DISCHARGE NOTE PROVIDER - NSDCFUADDINST_GEN_ALL_CORE_FT
Please follow up with your Primary Care Provider as soon as possible, and take Aspirin only after seeing your PCP as well as Dr Hernandez in their office. Please follow up with your Primary Care Provider as soon as possible, and take Aspirin only after seeing your PCP as well as Dr. Hernandez in his office.    Warning Signs:  Please call your doctor or nurse practitioner if you experience the following:  *You experience new chest pain, pressure, squeezing or tightness.  *New or worsening cough, shortness of breath, or wheeze.  *If you are vomiting and cannot keep down fluids or your medications.  *You are getting dehydrated due to continued vomiting, diarrhea, or other reasons. Signs of dehydration include dry mouth, rapid heartbeat, or feeling dizzy or faint when standing.  *You see blood or dark/black material when you vomit or have a bowel movement.  *You experience burning when you urinate, have blood in your urine, or experience a discharge.  *Your pain is not improving within 8-12 hours or is not gone within 24 hours. Call or return immediately if your pain is getting worse, changes location, or moves to your chest or back.  *You have shaking chills, or fever greater than 101.5 degrees Fahrenheit or 38 degrees Celsius.  *Any change in your symptoms, or any new symptoms that concern you.    Please continue a LOW-FIBER DIET. Listed below are some foods you may eat and those you should avoid.   --Allowed foods:  White bread without nuts and seeds. White rice, plain white pasta, and crackers. Refined hot cereals, such as Cream of Wheat, or cold cereals with less than 1 gram of fiber per serving. Pancakes or waffles made from white refined flour. Most canned or well-cooked vegetables and fruits without skins or seeds. Fruit and vegetable juice with little or no pulp, fruit-flavored drinks, and flavored bell. Tender meat, poultry, fish, eggs and tofu. Milk and foods made from milk — such as yogurt, pudding, ice cream, cheeses and sour cream — if tolerated. Butter, margarine, oils and salad dressings without seeds.    --Foods to avoid:  Whole-wheat or whole-grain breads, cereals and pasta. Brown or wild rice and other whole grains, such as oats, kasha, barley and quinoa. Dried fruits and prune juice. Raw fruit, including those with seeds, skin or membranes, such as berries.  Raw or undercooked vegetables, including corn.  Dried beans, peas and lentils. Seeds and nuts and foods containing them, including peanut butter and other nut butters. Coconut. Popcorn.

## 2021-02-02 NOTE — DISCHARGE NOTE NURSING/CASE MANAGEMENT/SOCIAL WORK - PATIENT PORTAL LINK FT
You can access the FollowMyHealth Patient Portal offered by [facility] by registering at the following website: [email or web address]. By joining [facility]’s FollowMyHealth portal, you will also be able to view your health information using other applications (apps) compatible with our system.

## 2021-02-02 NOTE — DISCHARGE NOTE PROVIDER - INSTRUCTIONS
Low Residue Diet Follow low fiber diet/low residue   - Vegetables should initially be cooked (backed, steamed, blanched)  - May want to start with peeled and/or canned foods   - Limit fat/oil intake and fried/greasy foods  - Add small amounts of fiber back in to the diet every couple days.

## 2021-02-05 DIAGNOSIS — K92.1 MELENA: ICD-10-CM

## 2021-02-05 DIAGNOSIS — I71.2 THORACIC AORTIC ANEURYSM, WITHOUT RUPTURE: ICD-10-CM

## 2021-02-05 DIAGNOSIS — G47.33 OBSTRUCTIVE SLEEP APNEA (ADULT) (PEDIATRIC): ICD-10-CM

## 2021-02-05 DIAGNOSIS — I10 ESSENTIAL (PRIMARY) HYPERTENSION: ICD-10-CM

## 2021-02-05 DIAGNOSIS — Z99.89 DEPENDENCE ON OTHER ENABLING MACHINES AND DEVICES: ICD-10-CM

## 2021-02-05 DIAGNOSIS — H40.9 UNSPECIFIED GLAUCOMA: ICD-10-CM

## 2021-02-05 DIAGNOSIS — D62 ACUTE POSTHEMORRHAGIC ANEMIA: ICD-10-CM

## 2021-02-05 DIAGNOSIS — J44.9 CHRONIC OBSTRUCTIVE PULMONARY DISEASE, UNSPECIFIED: ICD-10-CM

## 2021-02-05 DIAGNOSIS — K57.31 DIVERTICULOSIS OF LARGE INTESTINE WITHOUT PERFORATION OR ABSCESS WITH BLEEDING: ICD-10-CM

## 2021-02-16 ENCOUNTER — APPOINTMENT (OUTPATIENT)
Dept: SURGERY | Facility: CLINIC | Age: 86
End: 2021-02-16
Payer: MEDICARE

## 2021-02-16 VITALS
BODY MASS INDEX: 26.96 KG/M2 | DIASTOLIC BLOOD PRESSURE: 74 MMHG | HEIGHT: 69 IN | OXYGEN SATURATION: 97 % | HEART RATE: 93 BPM | WEIGHT: 182 LBS | TEMPERATURE: 97.2 F | SYSTOLIC BLOOD PRESSURE: 128 MMHG

## 2021-02-16 PROCEDURE — 99213 OFFICE O/P EST LOW 20 MIN: CPT

## 2021-02-16 PROCEDURE — 99072 ADDL SUPL MATRL&STAF TM PHE: CPT

## 2021-02-16 NOTE — ASSESSMENT
[FreeTextEntry1] : Pt is an 87 y/o M with PMHx of COPD, aortic aneurysms and L inguinal hernia presents here for follow up after hospital admission for lower GI bleed. Doing well. Denies bleeding. Pt states his left inguinal hernia has been getting larger so I suggested a hernia repair. Pt is unsure due to his advanced age so I advised him to speak to his PCP and cardiologist. Informed him that if he does not have the hernia repair, he is at risk for more serious complications. I advised pt to avoid constipation as this can exacerbate the herniation. I also advised patient to follow up with GI to do a repeat colonoscopy. He will follow up if considering L inguinal hernia repair or PRN.\par \par

## 2021-02-16 NOTE — ADDENDUM
[FreeTextEntry1] : This note was written by Mirela Cooper on 02/16/2021 acting as scribe for Dr. Hernandez

## 2021-02-16 NOTE — HISTORY OF PRESENT ILLNESS
[de-identified] : Pt is an 85 y/o M with PMHx of COPD, aortic aneurysms and L inguinal hernia presents here for follow up after hospital admission for lower GI bleed. Pt required ~6 units of blood due to rectal bleeding. GI bleed could have been due to diverticulosis. Pt presents today feeling well. He denies bleeding, pain. States he is eating normally and feels back to normal since his hospitalization. Pt reports that his left inguinal hernia has been getting larger but it is reducible and not very bothersome. He is unsure if he wants to consider a hernia repair due to his advanced age.\par

## 2021-02-16 NOTE — PHYSICAL EXAM
[Normal] : affect appropriate [de-identified] : normal respiration [de-identified] : L inguinal hernia

## 2021-03-15 ENCOUNTER — LABORATORY RESULT (OUTPATIENT)
Age: 86
End: 2021-03-15

## 2021-03-17 ENCOUNTER — APPOINTMENT (OUTPATIENT)
Dept: PULMONOLOGY | Facility: CLINIC | Age: 86
End: 2021-03-17
Payer: MEDICARE

## 2021-03-17 VITALS
BODY MASS INDEX: 26.96 KG/M2 | HEART RATE: 63 BPM | WEIGHT: 182 LBS | DIASTOLIC BLOOD PRESSURE: 84 MMHG | HEIGHT: 69 IN | TEMPERATURE: 97.3 F | SYSTOLIC BLOOD PRESSURE: 120 MMHG | OXYGEN SATURATION: 97 %

## 2021-03-17 PROCEDURE — 99213 OFFICE O/P EST LOW 20 MIN: CPT | Mod: 25,GC

## 2021-03-17 PROCEDURE — 99072 ADDL SUPL MATRL&STAF TM PHE: CPT

## 2021-03-17 PROCEDURE — 94010 BREATHING CAPACITY TEST: CPT | Mod: GC

## 2021-03-18 NOTE — CONSULT LETTER
[Dear  ___] : Dear  [unfilled], [Courtesy Letter:] : I had the pleasure of seeing your patient, [unfilled], in my office today. [Please see my note below.] : Please see my note below. [Consult Closing:] : Thank you very much for allowing me to participate in the care of this patient.  If you have any questions, please do not hesitate to contact me. [Sincerely,] : Sincerely, [FreeTextEntry2] : Reese Butt MD\par [address] \[place] [address] [FreeTextEntry3] : Maria Esther Rodgers MD

## 2021-03-18 NOTE — REVIEW OF SYSTEMS
[Negative] : Psychiatric [Fever] : no fever [Chills] : no chills [Dry Eyes] : no dry eyes [Eye Irritation] : no eye irritation [Sinus Problems] : no sinus problems [Cough] : no cough [Sputum] : no sputum [Dyspnea] : no dyspnea [A.M. Dry Mouth] : no a.m. dry mouth [SOB on Exertion] : no sob on exertion [Chest Discomfort] : no chest discomfort [Orthopnea] : no orthopnea [Nasal Discharge] : no nasal discharge [GERD] : no gerd [Headache] : no headache [Dizziness] : no dizziness [Diabetes] : no diabetes [Thyroid Problem] : no thyroid problem

## 2021-03-18 NOTE — HISTORY OF PRESENT ILLNESS
[TextBox_4] : 7/26/18 [Heather]: 84M with a PMHx of (?)COPD, GERALD on CPAP at home, presents for establishment of care for his COPD, and concerns about his slow decrease in exercise tolerance over the last 1-2 years. Patient currently is asymptomatic but notes that he has not been able to run as long as he used to. He is still able to climb up to 6 flights of stairs and when he gets dyspneic, he has no wheezing and will be back to normal after resting for less than a minute. \par The patient currently has a proair with him, but has never used it. Patient is a former smoker (20ppy, quit approximately 50 years ago) but has no other significant social history. 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 \par \par 3/13/19: Had a head cold, runny nose, developed a cough, dry, then severely productive and went on and on and on. So Dr Butt told him to come and check in with me, although his symptoms have now resolved without therapy as of about 2 weeks ago. Didn't feel sick. No change in dyspnea, or maybe a little more. Back to 90% now. \par \par 1/27/20: He recently had a typical cold w runny nose and sore throat, and then developed a productive cough for a couple of weeks without dyspnea or fatigue. Has recovered now. Pursuing all usual activities. Cares for 4 year old grandson. Had flu shot. No inhalers.\par \par 03/17/21: [Goran]: Here for follow upafter > 1 year, [place], no ER / urgent care viisits or hospital admissions due to COPD.  he has not required to use rescue inhaler even once in last 1 yr. He is fully functional , active and takes care of his 5 yr old grandson. not on any inhalers .

## 2021-03-18 NOTE — PHYSICAL EXAM
[No Acute Distress] : no acute distress [Normal Oropharynx] : normal oropharynx [I] : Mallampati Class: I [Normal Appearance] : normal appearance [No Neck Mass] : no neck mass [Normal Rate/Rhythm] : normal rate/rhythm [Normal S1, S2] : normal s1, s2 [No Murmurs] : no murmurs [No Resp Distress] : no resp distress [Clear to Auscultation Bilaterally] : clear to auscultation bilaterally [No Abnormalities] : no abnormalities [Benign] : benign [Normal Gait] : normal gait [No Clubbing] : no clubbing [No Cyanosis] : no cyanosis [No Edema] : no edema [Normal Color/ Pigmentation] : normal color/ pigmentation [No Focal Deficits] : no focal deficits [Oriented x3] : oriented x3 [Normal Affect] : normal affect

## 2021-03-18 NOTE — ASSESSMENT
[FreeTextEntry1] : Data reviewed:\par \par PA/lat CXR 1/2018 [facility]: clear lungs, mildly hyperinflated\par CTA [facility] 1/2020 personally reviewed: unremarkable lungs\par \par PFT 7/26/18: mod obstruction with no BD response (53%-->59%), normal volumes and DLCO\par [place] 3/13/19: obstructed, FEV1 57%\par Jh 1/27/20: obstruction, FEV1 46%\par \par CPET [facility] 9/4/18: normal exercise capacity, with reduced breathing reserve and desaturation to 90% at peak, no dynamic hyperinflation\par \par Impression:\par Moderate COPD\par \par Plan:\par Completely asymptomatic COPD , not on any inhalers. Not limited at all in his functional capacity due to SOB/ TANG. Received Covid vaccine already. \par -Follow up in 1 year. \par \par \par --\par Attending Addendum\par \par Seen and examined by me and agree w above. Pleasant elderly man w COPD, asymptomatic w no exacerbations. No interval illnesses and had Covid vax. Crystal Vargas is at [employer] and will go to [place] next year. He should continue w albuterol prn and follow annually, or sooner if there is a clinical change.

## 2022-03-02 ENCOUNTER — APPOINTMENT (OUTPATIENT)
Dept: CARDIOTHORACIC SURGERY | Facility: CLINIC | Age: 87
End: 2022-03-02
Payer: MEDICARE

## 2022-03-02 VITALS
DIASTOLIC BLOOD PRESSURE: 77 MMHG | TEMPERATURE: 96.8 F | OXYGEN SATURATION: 96 % | SYSTOLIC BLOOD PRESSURE: 167 MMHG | RESPIRATION RATE: 18 BRPM | HEART RATE: 77 BPM | BODY MASS INDEX: 23.85 KG/M2 | HEIGHT: 69 IN | WEIGHT: 161 LBS

## 2022-03-02 DIAGNOSIS — I71.9 AORTIC ANEURYSM OF UNSPECIFIED SITE, W/OUT RUPTURE: ICD-10-CM

## 2022-03-02 PROCEDURE — 99214 OFFICE O/P EST MOD 30 MIN: CPT

## 2022-03-03 PROBLEM — I71.9 AORTIC ROOT ANEURYSM: Status: ACTIVE | Noted: 2018-12-27

## 2022-03-03 RX ORDER — PILOCARPINE HYDROCHLORIDE OPHTHALMIC SOLUTION 10 MG/ML
1 SOLUTION/ DROPS OPHTHALMIC
Refills: 0 | Status: ACTIVE | COMMUNITY

## 2022-03-03 RX ORDER — TIMOLOL MALEATE 2.5 MG/ML
0.25 SOLUTION/ DROPS OPHTHALMIC
Refills: 0 | Status: ACTIVE | COMMUNITY

## 2022-03-03 RX ORDER — METOPROLOL SUCCINATE 25 MG/1
25 TABLET, EXTENDED RELEASE ORAL
Refills: 0 | Status: COMPLETED | COMMUNITY
End: 2022-03-03

## 2022-03-03 RX ORDER — NETARSUDIL AND LATANOPROST OPHTHALMIC SOLUTION, 0.02%/0.005% .2; .05 MG/ML; MG/ML
0.02-0.005 SOLUTION/ DROPS OPHTHALMIC; TOPICAL
Refills: 0 | Status: ACTIVE | COMMUNITY

## 2022-03-23 ENCOUNTER — APPOINTMENT (OUTPATIENT)
Dept: PULMONOLOGY | Facility: CLINIC | Age: 87
End: 2022-03-23
Payer: MEDICARE

## 2022-03-23 VITALS
OXYGEN SATURATION: 97 % | HEIGHT: 69 IN | TEMPERATURE: 97 F | WEIGHT: 161 LBS | SYSTOLIC BLOOD PRESSURE: 118 MMHG | DIASTOLIC BLOOD PRESSURE: 70 MMHG | BODY MASS INDEX: 23.85 KG/M2 | HEART RATE: 74 BPM

## 2022-03-23 PROCEDURE — 99213 OFFICE O/P EST LOW 20 MIN: CPT

## 2022-03-23 NOTE — HISTORY OF PRESENT ILLNESS
[TextBox_4] : 7/26/18 [Heather]: 84M with a PMHx of (?)COPD, GERALD on CPAP at home, presents for establishment of care for his COPD, and concerns about his slow decrease in exercise tolerance over the last 1-2 years. Patient currently is asymptomatic but notes that he has not been able to run as long as he used to. He is still able to climb up to 6 flights of stairs and when he gets dyspneic, he has no wheezing and will be back to normal after resting for less than a minute. \par The patient currently has a proair with him, but has never used it. Patient is a former smoker (20ppy, quit approximately 50 years ago) but has no other significant social history. 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 \par \par 3/13/19: Had a head cold, runny nose, developed a cough, dry, then severely productive and went on and on and on. So Dr Butt told him to come and check in with me, although his symptoms have now resolved without therapy as of about 2 weeks ago. Didn't feel sick. No change in dyspnea, or maybe a little more. Back to 90% now. \par \par 1/27/20: He recently had a typical cold w runny nose and sore throat, and then developed a productive cough for a couple of weeks without dyspnea or fatigue. Has recovered now. Pursuing all usual activities. Cares for 4 year old grandson. Had flu shot. No inhalers.\par \par 3/17/21 [gerald Zimmer]: Seen and examined by me and agree w above. Pleasant elderly man w COPD, asymptomatic w no exacerbations. No interval illnesses and had Covid vax. Crystal Vargas is at [employer] and will go to [place] next year. He should continue w albuterol prn and follow annually, or sooner if there is a clinical change. \par \par 3/23/22: Doing well. Notices himself slowing down with age, but able to do everything he wants and never even uses his albuterol prn. Hasn't had Covid and no sig interval health events.\par

## 2022-03-23 NOTE — ASSESSMENT
[FreeTextEntry1] : Data reviewed:\par \par PA/lat CXR 1/2018 [facility]: clear lungs, mildly hyperinflated\par CTA [facility] 1/2020 personally reviewed: unremarkable lungs\par \par PFT 7/26/18: mod obstruction with no BD response (53%-->59%), normal volumes and DLCO\par [place] 3/13/19: obstructed, FEV1 57%\par Jh 1/27/20: obstruction, FEV1 46%\par \par CPET [facility] 9/4/18: normal exercise capacity, with reduced breathing reserve and desaturation to 90% at peak, no dynamic hyperinflation\par \par Impression:\par Moderate COPD\par \par Plan:\par COPD, asymptomatic w no exacerbations. \par Continue w albuterol prn and follow annually, or sooner if there is a clinical change. \par Discussed w him that we can always try long acting bronchodilators if he would like.\par \par

## 2022-06-14 NOTE — ED ADULT NURSE NOTE - NSFALLRSKASSESSDT_ED_ALL_ED
28-Oct-2018 09:23 Mastoid Interpolation Flap Text: A decision was made to reconstruct the defect utilizing an interpolation axial flap and a staged reconstruction.  A telfa template was made of the defect.  This telfa template was then used to outline the mastoid interpolation flap.  The donor area for the pedicle flap was then injected with anesthesia.  The flap was excised through the skin and subcutaneous tissue down to the layer of the underlying musculature.  The pedicle flap was carefully excised within this deep plane to maintain its blood supply.  The edges of the donor site were undermined.   The donor site was closed in a primary fashion.  The pedicle was then rotated into position and sutured.  Once the tube was sutured into place, adequate blood supply was confirmed with blanching and refill.  The pedicle was then wrapped with xeroform gauze and dressed appropriately with a telfa and gauze bandage to ensure continued blood supply and protect the attached pedicle.

## 2022-10-25 NOTE — PATIENT PROFILE ADULT. - ABILITY TO HEAR (WITH HEARING AID OR HEARING APPLIANCE IF NORMALLY USED):
Date of injury: 03/17/2022  Initial treatment date: 06/27/2022  Visit count (for above DOI): 15  Date of informed consent on file: 06/27/2022     SUBJECTIVE:  Toshia Prater returns for chiropractic reevaluation. She's been feeling better. She still feels a little tight but the pain is less consistent and severe. She's not sore today despite doing a [employer] class last night. She has noticed that her right periscapular region twitches a little if she lays on her right side. The patient denies any adverse events following last treatment or any new/changing symptoms.    OBJECTIVE:  Passive spinal segmental range of motion evaluation reveals right rotation restriction of C4; left rotation restriction of C5; and extension restriction of T2.  Palpatory examination reveals hypertonicity and hyperalgesia of the right upper trapezius and right levator scapula.    ASSESSMENT:  Toshia Prater is being treated for persistent myofascial cervicothoracic pain following sprain/strain, triggering headaches. I have suspected the specific culprit is the right levator scapula. This is occurring in the presence of spinal joint dysfunction, multilevel degenerative disc disease with central protrusions, and C5-6 mild spinal canal narrowing. She is improving from recent slight exacerbation and will be having epidural steroid injection next week. Care today will involve manual therapy for correction of aberrant cervical and thoracic segmental motion and symptom relief; myofascial release techniques for release of hypertonic musculature; and exercise prescription and coaching.    ACTION:  Diversified adjustment was applied to the above cervical listings in supine.   Diversified adjustment was applied to the above thoracic listings in prone.    Instrument assisted myofascial release was applied to the right upper trapezius and the right levator scapula.    PLAN:  It was recommended that Toshia Prater continue performance of the  prescribed home exercise program. Ice as needed for any residual soreness post-treatment and call our office with any problems, questions, or worsening of symptoms. Follow up with me 1x/week for the next 2 weeks. However, if symptoms are stable in 1 week she may cancel the first FUV and keep the second (follow-up to epidural).    RESTRICTIONS:  Per Dr. Silva    Patient notes (contraindications, preferences): none known    On 10/25/2022, IRip scribed the services personally performed by Raeann Chaidez DC.  The documentation recorded by the scribe accurately and completely reflects the service(s) I personally performed and the decisions made by me.    Adequate: hears normal conversation without difficulty

## 2022-12-15 NOTE — ED ADULT TRIAGE NOTE - NS ED NURSE BANDS TYPE
Name band; Have You Ever Had A Swollen Joint?: Yes Do Your Fingernails Or Tonails Have Holes Or Pits?: No Positive Screening Text: A score of 3 or greater is considered a positive PEST score. Negative Screening Text: A score of less than 3 is considered a negative PEST score. Detail Level: Simple

## 2023-01-19 NOTE — PROGRESS NOTE ADULT - SUBJECTIVE AND OBJECTIVE BOX
O/N: JOSE, VSS  5/10: Hgb 7.4, 1 unit PRBC given, scope today w/ Dr. Ley, regualar diet, post transfusion H/H: 8.6/26.5 O/N: JOSE, VSS  5/10: Hgb 7.4, 1 unit PRBC given, scope today w/ Dr. Ley, regualar diet, post transfusion H/H: 8.6/26.5     SUBJECTIVE: Patient seen and examined bedside by chief resident. pt admits to an episode of BRBPR this morning. pt denies abdominal pain, nausea, vomiting, diarrhea, constipation, sob, fever,    amLODIPine   Tablet 5 milliGRAM(s) Oral daily      Vital Signs Last 24 Hrs  T(C): 36.7 (11 May 2018 05:18), Max: 37.1 (10 May 2018 20:27)  T(F): 98.1 (11 May 2018 05:18), Max: 98.7 (10 May 2018 20:27)  HR: 89 (11 May 2018 05:18) (70 - 95)  BP: 115/69 (11 May 2018 05:18) (93/75 - 115/69)  BP(mean): --  RR: 16 (11 May 2018 05:18) (14 - 16)  SpO2: 99% (11 May 2018 05:18) (95% - 100%)  I&O's Detail    10 May 2018 07:01  -  11 May 2018 07:00  --------------------------------------------------------  IN:    Oral Fluid: 780 mL    sodium chloride 0.9%.: 440 mL  Total IN: 1220 mL    OUT:    Voided: 1050 mL  Total OUT: 1050 mL    Total NET: 170 mL          General: NAD, resting comfortably in bed  C/V: NSR  Pulm: Nonlabored breathing, no respiratory distress  Abd: soft, NT/ND.  Extrem: WWP, no edema, SCDs in place        LABS:                        8.6    9.4   )-----------( 134      ( 10 May 2018 17:04 )             26.5     05-10    143  |  104  |  10  ----------------------------<  120<H>  4.2   |  29  |  0.78    Ca    8.4      10 May 2018 07:03  Phos  3.2     05-10  Mg     1.8     05-10    TPro  5.9<L>  /  Alb  3.7  /  TBili  0.4  /  DBili  x   /  AST  21  /  ALT  16  /  AlkPhos  53  05-09    PT/INR - ( 09 May 2018 10:56 )   PT: 11.8 sec;   INR: 1.06          PTT - ( 10 May 2018 07:03 )  PTT:25.0 sec Sotyktu Counseling:  I discussed the most common side effects of Sotyktu including: common cold, sore throat, sinus infections, cold sores, canker sores, folliculitis, and acne.  I also discussed more serious side effects of Sotyktu including but not limited to: serious allergic reactions; increased risk for infections such as TB; cancers such as lymphomas; rhabdomyolysis and elevated CPK; and elevated triglycerides and liver enzymes.

## 2023-03-16 ENCOUNTER — APPOINTMENT (OUTPATIENT)
Dept: PULMONOLOGY | Facility: CLINIC | Age: 88
End: 2023-03-16
Payer: MEDICARE

## 2023-03-16 VITALS
OXYGEN SATURATION: 98 % | BODY MASS INDEX: 23.85 KG/M2 | HEIGHT: 69 IN | WEIGHT: 161 LBS | DIASTOLIC BLOOD PRESSURE: 76 MMHG | TEMPERATURE: 97.4 F | SYSTOLIC BLOOD PRESSURE: 130 MMHG | HEART RATE: 81 BPM

## 2023-03-16 PROCEDURE — 99213 OFFICE O/P EST LOW 20 MIN: CPT

## 2023-03-16 NOTE — CONSULT LETTER
[Dear  ___] : Dear  [unfilled], [Courtesy Letter:] : I had the pleasure of seeing your patient, [unfilled], in my office today. [Please see my note below.] : Please see my note below. [Consult Closing:] : Thank you very much for allowing me to participate in the care of this patient.  If you have any questions, please do not hesitate to contact me. [Sincerely,] : Sincerely, [FreeTextEntry2] : Reese Butt MD\par [address] \[place] [address] [FreeTextEntry3] : Maria Esther Rodgers MD

## 2023-03-16 NOTE — HISTORY OF PRESENT ILLNESS
[TextBox_4] : 7/26/18 [Heather]: 84M with a PMHx of (?)COPD, GERALD on CPAP at home, presents for establishment of care for his COPD, and concerns about his slow decrease in exercise tolerance over the last 1-2 years. Patient currently is asymptomatic but notes that he has not been able to run as long as he used to. He is still able to climb up to 6 flights of stairs and when he gets dyspneic, he has no wheezing and will be back to normal after resting for less than a minute. \par The patient currently has a proair with him, but has never used it. Patient is a former smoker (20ppy, quit approximately 50 years ago) but has no other significant social history. 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 \par \par 3/13/19: Had a head cold, runny nose, developed a cough, dry, then severely productive and went on and on and on. So Dr Butt told him to come and check in with me, although his symptoms have now resolved without therapy as of about 2 weeks ago. Didn't feel sick. No change in dyspnea, or maybe a little more. Back to 90% now. \par \par 1/27/20: He recently had a typical cold w runny nose and sore throat, and then developed a productive cough for a couple of weeks without dyspnea or fatigue. Has recovered now. Pursuing all usual activities. Cares for 4 year old grandson. Had flu shot. No inhalers.\par \par 3/17/21 [gerald Zimmer]: Seen and examined by me and agree w above. Pleasant elderly man w COPD, asymptomatic w no exacerbations. No interval illnesses and had Covid vax. Crystal Vargas is at [employer] and will go to [place] next year. He should continue w albuterol prn and follow annually, or sooner if there is a clinical change. \par \par 3/23/22: Doing well. Notices himself slowing down with age, but able to do everything he wants and never even uses his albuterol prn. Hasn't had Covid and no sig interval health events.\par \par 3/16/2023: Continues to do well. Only interim health event was shaving of a lesion on his tongue which was reportedly benign. Feels himself slowing w age but rides his bike and walked here from [address] Never uses albuterol. Breathing does not limit his activity in any way.\par

## 2023-03-16 NOTE — ASSESSMENT
[FreeTextEntry1] : Data reviewed:\par \par PA/lat CXR 1/2018 [facility]: clear lungs, mildly hyperinflated\par CTA [facility] 1/2020 personally reviewed: unremarkable lungs\par \par PFT 7/26/18: mod obstruction with no BD response (53%-->59%), normal volumes and DLCO\par [place] 3/13/19: obstructed, FEV1 57%\par Jh 1/27/20: obstruction, FEV1 46%\par \par CPET [facility] 9/4/18: normal exercise capacity, with reduced breathing reserve and desaturation to 90% at peak, no dynamic hyperinflation\par \par Impression:\par Moderate COPD\par \par Plan:\par COPD, asymptomatic w no exacerbations. \par Continue w albuterol prn and follow annually, or sooner if there is a clinical change. \par Same discussion of long acting bronchodilators as an option if he were more symptomatic.\par \par

## 2023-07-03 NOTE — ED ADULT NURSE NOTE - PAIN: PRESENCE, MLM
denies pain/discomfort Xolair Pregnancy And Lactation Text: This medication is Pregnancy Category B and is considered safe during pregnancy. This medication is excreted in breast milk.

## 2023-12-17 ENCOUNTER — NON-APPOINTMENT (OUTPATIENT)
Age: 88
End: 2023-12-17

## 2024-02-01 ENCOUNTER — NON-APPOINTMENT (OUTPATIENT)
Age: 89
End: 2024-02-01

## 2024-03-15 ENCOUNTER — APPOINTMENT (OUTPATIENT)
Dept: PULMONOLOGY | Facility: CLINIC | Age: 89
End: 2024-03-15
Payer: MEDICARE

## 2024-03-15 VITALS
BODY MASS INDEX: 23.25 KG/M2 | HEIGHT: 69 IN | TEMPERATURE: 97.1 F | OXYGEN SATURATION: 95 % | WEIGHT: 157 LBS | SYSTOLIC BLOOD PRESSURE: 120 MMHG | HEART RATE: 92 BPM | DIASTOLIC BLOOD PRESSURE: 84 MMHG

## 2024-03-15 DIAGNOSIS — J44.9 CHRONIC OBSTRUCTIVE PULMONARY DISEASE, UNSPECIFIED: ICD-10-CM

## 2024-03-15 PROCEDURE — 99213 OFFICE O/P EST LOW 20 MIN: CPT

## 2024-03-15 RX ORDER — ALBUTEROL SULFATE 90 UG/1
108 (90 BASE) INHALANT RESPIRATORY (INHALATION)
Qty: 1 | Refills: 3 | Status: ACTIVE | COMMUNITY
Start: 2019-03-13 | End: 1900-01-01

## 2024-03-15 NOTE — HISTORY OF PRESENT ILLNESS
[TextBox_4] : 18 [Heather]: 84M with a PMHx of (?)COPD, GERALD on CPAP at home, presents for establishment of care for his COPD, and concerns about his slow decrease in exercise tolerance over the last 1-2 years. Patient currently is asymptomatic but notes that he has not been able to run as long as he used to. He is still able to climb up to 6 flights of stairs and when he gets dyspneic, he has no wheezing and will be back to normal after resting for less than a minute.  The patient currently has a proair with him, but has never used it. Patient is a former smoker (20ppy, quit approximately 50 years ago) but has no other significant social history. 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   3/13/19: Had a head cold, runny nose, developed a cough, dry, then severely productive and went on and on and on. So Dr Butt told him to come and check in with me, although his symptoms have now resolved without therapy as of about 2 weeks ago. Didn't feel sick. No change in dyspnea, or maybe a little more. Back to 90% now.   20: He recently had a typical cold w runny nose and sore throat, and then developed a productive cough for a couple of weeks without dyspnea or fatigue. Has recovered now. Pursuing all usual activities. Cares for 4 year old grandson. Had flu shot. No inhalers.  3/17/21 [gerald Zimmer]: Seen and examined by me and agree w above. Pleasant elderly man w COPD, asymptomatic w no exacerbations. No interval illnesses and had Covid vax. Crystal Vargas is at [employer] and will go to [place] next year. He should continue w albuterol prn and follow annually, or sooner if there is a clinical change.   3/23/22: Doing well. Notices himself slowing down with age, but able to do everything he wants and never even uses his albuterol prn. Hasn't had Covid and no sig interval health events.  3/16/2023: Continues to do well. Only interim health event was shaving of a lesion on his tongue which was reportedly benign. Feels himself slowing w age but rides his bike and walked here from [address] Never uses albuterol. Breathing does not limit his activity in any way.  3/15/2024: Still dealing with the precancerous tongue lesion, will have another procedure. Breathing is fine. No use of albuterol. His old albuterol has . Still rides his stationary bike and does yoga and walks around. Got Covid vaccine couple of days ago. Got RSV vaccine. Playing trumpet again. Having a big 89[employer] party next month.

## 2024-03-15 NOTE — CONSULT LETTER
[Dear  ___] : Dear  [unfilled], [Courtesy Letter:] : I had the pleasure of seeing your patient, [unfilled], in my office today. [Please see my note below.] : Please see my note below. [Consult Closing:] : Thank you very much for allowing me to participate in the care of this patient.  If you have any questions, please do not hesitate to contact me. [Sincerely,] : Sincerely, [FreeTextEntry2] : Reese Butt MD\par  [address] \[place] [address] [FreeTextEntry3] : Maria Esther Rodgers MD

## 2024-03-15 NOTE — ASSESSMENT
[FreeTextEntry1] : Data reviewed:  PA/lat CXR 1/2018 [facility]: clear lungs, mildly hyperinflated CTA [facility] 1/2020 personally reviewed: unremarkable lungs  PFT 7/26/18: mod obstruction with no BD response (53%-->59%), normal volumes and DLCO [place] 3/13/19: obstructed, FEV1 57% Jh 1/27/20: obstruction, FEV1 46%  CPET [facility] 9/4/18: normal exercise capacity, with reduced breathing reserve and desaturation to 90% at peak, no dynamic hyperinflation  Impression: Moderate COPD  Plan: COPD, asymptomatic w no exacerbations.  He's doing great. He will keep albuterol on hand. He can see me in a year, or sooner for any new problems. Happy birthday!

## 2024-09-19 NOTE — ED ADULT TRIAGE NOTE - OTHER COMPLAINTS
Case Management Discharge Planning Note    Patient name Sagar Acuna  Location [facility] 2 /South 2 M* MRN [id number]  : 1998 Date 2024       Current Admission Date: 2024  Current Admission Diagnosis:Diabetic ketoacidosis associated with type 1 diabetes mellitus (HCC)   Patient Active Problem List    Diagnosis Date Noted Date Diagnosed    Hypokalemia 2024     Hypomagnesemia 2024     Diabetic ketoacidosis associated with type 1 diabetes mellitus (HCC) 2024     SIRS (systemic inflammatory response syndrome) (HCC) 2024     Pseudohyponatremia 2023     Subaortic membrane 2022     Type 1 diabetes mellitus with hyperglycemia (HCC) 2022       LOS (days): 3  Geometric Mean LOS (GMLOS) (days):   Days to GMLOS:     OBJECTIVE:  Risk of Unplanned Readmission Score: 8.68         Current admission status: Inpatient   Preferred Pharmacy:   [id number] - [place] - [address] [place] [address]  [place] [address]  Phone: [phone] Fax: [phone]    Primary Care Provider: Doris Flores    Primary Insurance: [employer]  Secondary Insurance:     DISCHARGE DETAILS:                                          Other Referral/Resources/Interventions Provided:  Financial Resources Provided: Financial Counselor (Pt is 100% eligible for financial asst at d/c if needed)                                                                              pt reports large amt of blood while having bowel movement today, reports hx of lower GI bleed 2 years ago that required transfusion, this is the first episode since, pt also reports generalized weakness but no CP or SOB, also hx of GERALD and glaucoma, pt A&Ox3, ambulatory in triage with steady gait, unable to obtain oral or axillary temp

## 2024-12-06 NOTE — ED PROVIDER NOTE - DATE/TIME 3
11/15/24   Marta Bravo, APRN - NP       Javi Saleh MD     
25-Jan-2021 00:26

## 2024-12-10 NOTE — PHYSICAL THERAPY INITIAL EVALUATION ADULT - ASR WT BEARING STATUS EVAL
Hand Exercises   Dr. Baer        1. ARROW - Place fingers together and keep them straight and in line with the wrist. Move the thumb out away from the fingers.     2. CLAW - Keep knuckles and wrist straight. Bend and straighten the fingers only.     3. TABLETOP - Make a tabletop with your fingers by bending at the knuckles while keeping your fingers and wrist straight.       4. FIST - Make a fist and open. Make sure that all joints are bending as much as possible.         5. IN & OUT - Place hand flat on a table, palm down. Spread the fingers apart as far as they will go and bring them back together.     6. THUMB To TIP - Touch the tip of each finger to the tip of the thumb and repeat.     Perform these exercises 3x per day.  Perform 3 sets of 10 reps for each exercise.    Post Operative Instructions:     Bandage and wound care:   - Please keep your dressing, splint, or cast on, clean and dry for 3 days after surgery. You can then remove the dressings on the 4th day and cover the incision with a regular band-aid if you wish.  You may also shower at that time without covering the incision and gently pat the incision dry.  You should not submerge the incision in water at any time.    - Do no submerge your incision- no baths, hot tubs, or swimming until advised by your doctor  - In the first 3 days, you may shower, but must keep the dressing, splint, or cast completely dry.  Please use cast/bandage protector for showering   - Please do not use anything topical on or around your incision including neosporin, lotion, or other topical antibiotics or antibacterial solutions, hydrogen peroxide, or essential oils.   - If you have concerns about your incision please call the office.     Activity:   - You should not lift, push, pull, or  anything heavier than 1-2 pounds with the affected hand until your next visit. While the pain in your hand should improve in a matter of days, some patients experience soreness for  weeks to months. After the first 3 days, you may perform clean, light activities such as typing or eating, but you should avoid any impact or strenuous activities.    - Continue to wiggle your fingers frequently. Be sure to bend your fingers into a fist and fully extend them at least ten times per hour to prevent finger swelling and stiffness. Follow the hand exercises that were provided to you with your pre operative paperwork. Do this several times a day.   - Swelling of the hand and fingers is common after surgery. This should improve over the first 5-7 days after surgery. To help reduce swelling and pain, please keep your operative extremity elevated on several pillows at all times when possible.    - Use ice packs as much as possible. It is best to ice 20 minutes every hour, especially within the first 48 hours. Do not place the ice pack directly onto the skin, but keep at least a thin layer between your skin and the ice pack.   - Do not drive a motor vehicle, operate machinery or appliances, make important decisions, sign legal documents, or drink alcohol for at least the next 24 hours.  Continue these restrictions while you are taking prescription pain medications.    Medications:  - Standard postoperative pain medication regimen throughout the country is to use over the counter pain medicine such as a combination of acetaminophen/tylenol and ibuprofen (or another NSAID if allowed by your primary care provider), instead of prescribing narcotic pain medicine.  Over the counter medicine in combination with Ice is now the national standard of care for pain control for the majority of hand and wrist surgeries.  - You may take 500 mg of tylenol alternating with 400mg of ibuprofen every 4-6 hours as needed for pain. Do NOT exceed 4000 mg of acetaminophen/tylenol in a 24 hour period.   - You may use over the counter stool softeners, such as Colace 100mg, twice a day. This can help with constipation that can happen  with taking narcotic pain medication. Hold colace for loose stools.  - If you have a fracture, take Calcium and vitamin D to help bone healing. Take as prescribed on bottle.  Vitamin C is also recommended for healing post-operatively.    Call Your Doctor If:  - You have severe pain not controlled by pain medications.  - Increased incisional swelling, redness, heat or bleeding.  - Incisional drainage that last more than 3 days after surgery.  - Discoloration of the fingers, or numbness and tingling.  - Temperature greater than 101ºF.  - If you are unable to empty your bladder in 8 - 12 hours after your surgery.    If you have any concerns, please call our office at [phone].     no weight-bearing restrictions

## 2025-01-08 ENCOUNTER — NON-APPOINTMENT (OUTPATIENT)
Age: 89
End: 2025-01-08

## 2025-03-28 ENCOUNTER — NON-APPOINTMENT (OUTPATIENT)
Age: 89
End: 2025-03-28

## 2025-03-28 ENCOUNTER — APPOINTMENT (OUTPATIENT)
Dept: PULMONOLOGY | Facility: CLINIC | Age: 89
End: 2025-03-28
Payer: MEDICARE

## 2025-03-28 VITALS
BODY MASS INDEX: 23.25 KG/M2 | SYSTOLIC BLOOD PRESSURE: 118 MMHG | OXYGEN SATURATION: 97 % | WEIGHT: 157 LBS | DIASTOLIC BLOOD PRESSURE: 78 MMHG | TEMPERATURE: 97.2 F | HEART RATE: 92 BPM | HEIGHT: 69 IN

## 2025-03-28 DIAGNOSIS — J44.9 CHRONIC OBSTRUCTIVE PULMONARY DISEASE, UNSPECIFIED: ICD-10-CM

## 2025-03-28 PROCEDURE — G2211 COMPLEX E/M VISIT ADD ON: CPT

## 2025-03-28 PROCEDURE — 99213 OFFICE O/P EST LOW 20 MIN: CPT

## 2025-07-31 ENCOUNTER — INPATIENT (INPATIENT)
Facility: HOSPITAL | Age: 89
LOS: 0 days | Discharge: HOME | DRG: 378 | End: 2025-08-01
Attending: STUDENT IN AN ORGANIZED HEALTH CARE EDUCATION/TRAINING PROGRAM | Admitting: STUDENT IN AN ORGANIZED HEALTH CARE EDUCATION/TRAINING PROGRAM
Payer: MEDICARE

## 2025-07-31 VITALS
RESPIRATION RATE: 18 BRPM | TEMPERATURE: 98 F | OXYGEN SATURATION: 95 % | HEART RATE: 95 BPM | WEIGHT: 154.98 LBS | DIASTOLIC BLOOD PRESSURE: 76 MMHG | SYSTOLIC BLOOD PRESSURE: 120 MMHG

## 2025-07-31 DIAGNOSIS — Z98.890 OTHER SPECIFIED POSTPROCEDURAL STATES: Chronic | ICD-10-CM

## 2025-07-31 DIAGNOSIS — H40.9 UNSPECIFIED GLAUCOMA: ICD-10-CM

## 2025-07-31 DIAGNOSIS — I10 ESSENTIAL (PRIMARY) HYPERTENSION: ICD-10-CM

## 2025-07-31 DIAGNOSIS — J44.9 CHRONIC OBSTRUCTIVE PULMONARY DISEASE, UNSPECIFIED: ICD-10-CM

## 2025-07-31 DIAGNOSIS — Z29.9 ENCOUNTER FOR PROPHYLACTIC MEASURES, UNSPECIFIED: ICD-10-CM

## 2025-07-31 DIAGNOSIS — E87.5 HYPERKALEMIA: ICD-10-CM

## 2025-07-31 DIAGNOSIS — K92.1 MELENA: ICD-10-CM

## 2025-07-31 LAB
ALBUMIN SERPL ELPH-MCNC: 4 G/DL — SIGNIFICANT CHANGE UP (ref 3.3–5)
ALP SERPL-CCNC: 82 U/L — SIGNIFICANT CHANGE UP (ref 40–120)
ALT FLD-CCNC: 13 U/L — SIGNIFICANT CHANGE UP (ref 10–45)
ANION GAP SERPL CALC-SCNC: 10 MMOL/L — SIGNIFICANT CHANGE UP (ref 5–17)
AST SERPL-CCNC: 21 U/L — SIGNIFICANT CHANGE UP (ref 10–40)
BASOPHILS # BLD AUTO: 0.05 K/UL — SIGNIFICANT CHANGE UP (ref 0–0.2)
BASOPHILS NFR BLD AUTO: 0.7 % — SIGNIFICANT CHANGE UP (ref 0–2)
BILIRUB SERPL-MCNC: 0.5 MG/DL — SIGNIFICANT CHANGE UP (ref 0.2–1.2)
BLD GP AB SCN SERPL QL: NEGATIVE — SIGNIFICANT CHANGE UP
BUN SERPL-MCNC: 21 MG/DL — SIGNIFICANT CHANGE UP (ref 7–23)
CALCIUM SERPL-MCNC: 9.4 MG/DL — SIGNIFICANT CHANGE UP (ref 8.4–10.5)
CHLORIDE SERPL-SCNC: 103 MMOL/L — SIGNIFICANT CHANGE UP (ref 96–108)
CO2 SERPL-SCNC: 26 MMOL/L — SIGNIFICANT CHANGE UP (ref 22–31)
CREAT SERPL-MCNC: 0.82 MG/DL — SIGNIFICANT CHANGE UP (ref 0.5–1.3)
EGFR: 83 ML/MIN/1.73M2 — SIGNIFICANT CHANGE UP
EGFR: 83 ML/MIN/1.73M2 — SIGNIFICANT CHANGE UP
EOSINOPHIL # BLD AUTO: 0.05 K/UL — SIGNIFICANT CHANGE UP (ref 0–0.5)
EOSINOPHIL NFR BLD AUTO: 0.7 % — SIGNIFICANT CHANGE UP (ref 0–6)
GLUCOSE SERPL-MCNC: 93 MG/DL — SIGNIFICANT CHANGE UP (ref 70–99)
HCT VFR BLD CALC: 37.9 % — LOW (ref 39–50)
HCT VFR BLD CALC: 40.1 % — SIGNIFICANT CHANGE UP (ref 39–50)
HGB BLD-MCNC: 12.2 G/DL — LOW (ref 13–17)
HGB BLD-MCNC: 13 G/DL — SIGNIFICANT CHANGE UP (ref 13–17)
IMM GRANULOCYTES # BLD AUTO: 0.03 K/UL — SIGNIFICANT CHANGE UP (ref 0–0.07)
IMM GRANULOCYTES NFR BLD AUTO: 0.4 % — SIGNIFICANT CHANGE UP (ref 0–0.9)
LACTATE SERPL-SCNC: 1.4 MMOL/L — SIGNIFICANT CHANGE UP (ref 0.5–2)
LYMPHOCYTES # BLD AUTO: 1.73 K/UL — SIGNIFICANT CHANGE UP (ref 1–3.3)
LYMPHOCYTES NFR BLD AUTO: 24.7 % — SIGNIFICANT CHANGE UP (ref 13–44)
MCHC RBC-ENTMCNC: 31.9 PG — SIGNIFICANT CHANGE UP (ref 27–34)
MCHC RBC-ENTMCNC: 31.9 PG — SIGNIFICANT CHANGE UP (ref 27–34)
MCHC RBC-ENTMCNC: 32.2 G/DL — SIGNIFICANT CHANGE UP (ref 32–36)
MCHC RBC-ENTMCNC: 32.4 G/DL — SIGNIFICANT CHANGE UP (ref 32–36)
MCV RBC AUTO: 98.3 FL — SIGNIFICANT CHANGE UP (ref 80–100)
MCV RBC AUTO: 99.2 FL — SIGNIFICANT CHANGE UP (ref 80–100)
MONOCYTES # BLD AUTO: 0.46 K/UL — SIGNIFICANT CHANGE UP (ref 0–0.9)
MONOCYTES NFR BLD AUTO: 6.6 % — SIGNIFICANT CHANGE UP (ref 2–14)
NEUTROPHILS # BLD AUTO: 4.68 K/UL — SIGNIFICANT CHANGE UP (ref 1.8–7.4)
NEUTROPHILS NFR BLD AUTO: 66.9 % — SIGNIFICANT CHANGE UP (ref 43–77)
NRBC # BLD AUTO: 0 K/UL — SIGNIFICANT CHANGE UP (ref 0–0)
NRBC # BLD AUTO: 0 K/UL — SIGNIFICANT CHANGE UP (ref 0–0)
NRBC # FLD: 0 K/UL — SIGNIFICANT CHANGE UP (ref 0–0)
NRBC # FLD: 0 K/UL — SIGNIFICANT CHANGE UP (ref 0–0)
NRBC BLD AUTO-RTO: 0 /100 WBCS — SIGNIFICANT CHANGE UP (ref 0–0)
NRBC BLD AUTO-RTO: 0 /100 WBCS — SIGNIFICANT CHANGE UP (ref 0–0)
PLATELET # BLD AUTO: 167 K/UL — SIGNIFICANT CHANGE UP (ref 150–400)
PLATELET # BLD AUTO: 173 K/UL — SIGNIFICANT CHANGE UP (ref 150–400)
PMV BLD: 10.3 FL — SIGNIFICANT CHANGE UP (ref 7–13)
PMV BLD: SIGNIFICANT CHANGE UP FL (ref 7–13)
POTASSIUM SERPL-MCNC: 4.7 MMOL/L — SIGNIFICANT CHANGE UP (ref 3.5–5.3)
POTASSIUM SERPL-MCNC: 5.7 MMOL/L — HIGH (ref 3.5–5.3)
POTASSIUM SERPL-SCNC: 4.7 MMOL/L — SIGNIFICANT CHANGE UP (ref 3.5–5.3)
POTASSIUM SERPL-SCNC: 5.7 MMOL/L — HIGH (ref 3.5–5.3)
PROT SERPL-MCNC: 7 G/DL — SIGNIFICANT CHANGE UP (ref 6–8.3)
RBC # BLD: 3.82 M/UL — LOW (ref 4.2–5.8)
RBC # BLD: 4.08 M/UL — LOW (ref 4.2–5.8)
RBC # FLD: 13.5 % — SIGNIFICANT CHANGE UP (ref 10.3–14.5)
RBC # FLD: SIGNIFICANT CHANGE UP % (ref 10.3–14.5)
RH IG SCN BLD-IMP: POSITIVE — SIGNIFICANT CHANGE UP
SODIUM SERPL-SCNC: 139 MMOL/L — SIGNIFICANT CHANGE UP (ref 135–145)
TROPONIN T, HIGH SENSITIVITY RESULT: 16 NG/L — SIGNIFICANT CHANGE UP (ref 0–51)
WBC # BLD: 7 K/UL — SIGNIFICANT CHANGE UP (ref 3.8–10.5)
WBC # BLD: 7.38 K/UL — SIGNIFICANT CHANGE UP (ref 3.8–10.5)
WBC # FLD AUTO: 7 K/UL — SIGNIFICANT CHANGE UP (ref 3.8–10.5)
WBC # FLD AUTO: 7.38 K/UL — SIGNIFICANT CHANGE UP (ref 3.8–10.5)

## 2025-07-31 PROCEDURE — 74177 CT ABD & PELVIS W/CONTRAST: CPT | Mod: 26

## 2025-07-31 PROCEDURE — 36415 COLL VENOUS BLD VENIPUNCTURE: CPT

## 2025-07-31 PROCEDURE — 93010 ELECTROCARDIOGRAM REPORT: CPT

## 2025-07-31 PROCEDURE — 80053 COMPREHEN METABOLIC PANEL: CPT

## 2025-07-31 PROCEDURE — 83605 ASSAY OF LACTIC ACID: CPT

## 2025-07-31 PROCEDURE — 99285 EMERGENCY DEPT VISIT HI MDM: CPT

## 2025-07-31 PROCEDURE — 99223 1ST HOSP IP/OBS HIGH 75: CPT | Mod: GC

## 2025-07-31 PROCEDURE — 84484 ASSAY OF TROPONIN QUANT: CPT

## 2025-07-31 PROCEDURE — 85027 COMPLETE CBC AUTOMATED: CPT

## 2025-07-31 PROCEDURE — 85025 COMPLETE CBC W/AUTO DIFF WBC: CPT

## 2025-07-31 PROCEDURE — 84132 ASSAY OF SERUM POTASSIUM: CPT

## 2025-07-31 RX ORDER — POLYETHYLENE GLYCOL 3350 17 G/17G
17 POWDER, FOR SOLUTION ORAL DAILY
Refills: 0 | Status: DISCONTINUED | OUTPATIENT
Start: 2025-07-31 | End: 2025-08-01

## 2025-07-31 RX ORDER — PILOCARPINE HYDROCHLORIDE OPHTHALMIC SOLUTION 20 MG/ML
1 SOLUTION/ DROPS OPHTHALMIC AT BEDTIME
Refills: 0 | Status: DISCONTINUED | OUTPATIENT
Start: 2025-07-31 | End: 2025-08-01

## 2025-07-31 RX ORDER — IPRATROPIUM BROMIDE AND ALBUTEROL SULFATE .5; 2.5 MG/3ML; MG/3ML
3 SOLUTION RESPIRATORY (INHALATION) EVERY 6 HOURS
Refills: 0 | Status: DISCONTINUED | OUTPATIENT
Start: 2025-07-31 | End: 2025-08-01

## 2025-07-31 RX ORDER — SENNA 187 MG
2 TABLET ORAL AT BEDTIME
Refills: 0 | Status: DISCONTINUED | OUTPATIENT
Start: 2025-07-31 | End: 2025-08-01

## 2025-07-31 RX ORDER — TIMOLOL MALEATE 6.8 MG/ML
1 SOLUTION OPHTHALMIC AT BEDTIME
Refills: 0 | Status: DISCONTINUED | OUTPATIENT
Start: 2025-07-31 | End: 2025-08-01

## 2025-07-31 RX ORDER — LATANOPROST PF 0.05 MG/ML
1 SOLUTION/ DROPS OPHTHALMIC AT BEDTIME
Refills: 0 | Status: DISCONTINUED | OUTPATIENT
Start: 2025-07-31 | End: 2025-08-01

## 2025-08-01 ENCOUNTER — TRANSCRIPTION ENCOUNTER (OUTPATIENT)
Age: 89
End: 2025-08-01

## 2025-08-01 ENCOUNTER — EMERGENCY (EMERGENCY)
Facility: HOSPITAL | Age: 89
LOS: 1 days | End: 2025-08-01
Attending: STUDENT IN AN ORGANIZED HEALTH CARE EDUCATION/TRAINING PROGRAM | Admitting: STUDENT IN AN ORGANIZED HEALTH CARE EDUCATION/TRAINING PROGRAM
Payer: MEDICARE

## 2025-08-01 VITALS
OXYGEN SATURATION: 97 % | SYSTOLIC BLOOD PRESSURE: 121 MMHG | HEART RATE: 70 BPM | RESPIRATION RATE: 17 BRPM | TEMPERATURE: 98 F | DIASTOLIC BLOOD PRESSURE: 70 MMHG

## 2025-08-01 VITALS
WEIGHT: 154.98 LBS | DIASTOLIC BLOOD PRESSURE: 40 MMHG | HEIGHT: 69 IN | SYSTOLIC BLOOD PRESSURE: 61 MMHG | RESPIRATION RATE: 18 BRPM | HEART RATE: 79 BPM

## 2025-08-01 VITALS — OXYGEN SATURATION: 98 % | RESPIRATION RATE: 18 BRPM | TEMPERATURE: 98 F | HEART RATE: 96 BPM

## 2025-08-01 DIAGNOSIS — I95.1 ORTHOSTATIC HYPOTENSION: ICD-10-CM

## 2025-08-01 DIAGNOSIS — D62 ACUTE POSTHEMORRHAGIC ANEMIA: ICD-10-CM

## 2025-08-01 DIAGNOSIS — Z98.890 OTHER SPECIFIED POSTPROCEDURAL STATES: Chronic | ICD-10-CM

## 2025-08-01 LAB
ALBUMIN SERPL ELPH-MCNC: 3.7 G/DL — SIGNIFICANT CHANGE UP (ref 3.3–5)
ALBUMIN SERPL ELPH-MCNC: 4.2 G/DL — SIGNIFICANT CHANGE UP (ref 3.3–5)
ALP SERPL-CCNC: 75 U/L — SIGNIFICANT CHANGE UP (ref 40–120)
ALP SERPL-CCNC: 79 U/L — SIGNIFICANT CHANGE UP (ref 40–120)
ALT FLD-CCNC: 12 U/L — SIGNIFICANT CHANGE UP (ref 10–45)
ALT FLD-CCNC: 13 U/L — SIGNIFICANT CHANGE UP (ref 10–45)
ANION GAP SERPL CALC-SCNC: 14 MMOL/L — SIGNIFICANT CHANGE UP (ref 5–17)
ANION GAP SERPL CALC-SCNC: 7 MMOL/L — SIGNIFICANT CHANGE UP (ref 5–17)
APPEARANCE UR: CLEAR — SIGNIFICANT CHANGE UP
APTT BLD: 28.6 SEC — SIGNIFICANT CHANGE UP (ref 26.1–36.8)
AST SERPL-CCNC: 19 U/L — SIGNIFICANT CHANGE UP (ref 10–40)
AST SERPL-CCNC: 20 U/L — SIGNIFICANT CHANGE UP (ref 10–40)
BASOPHILS # BLD AUTO: 0.05 K/UL — SIGNIFICANT CHANGE UP (ref 0–0.2)
BASOPHILS # BLD AUTO: 0.06 K/UL — SIGNIFICANT CHANGE UP (ref 0–0.2)
BASOPHILS # BLD AUTO: 0.07 K/UL — SIGNIFICANT CHANGE UP (ref 0–0.2)
BASOPHILS NFR BLD AUTO: 0.8 % — SIGNIFICANT CHANGE UP (ref 0–2)
BASOPHILS NFR BLD AUTO: 0.9 % — SIGNIFICANT CHANGE UP (ref 0–2)
BASOPHILS NFR BLD AUTO: 0.9 % — SIGNIFICANT CHANGE UP (ref 0–2)
BILIRUB SERPL-MCNC: 0.2 MG/DL — SIGNIFICANT CHANGE UP (ref 0.2–1.2)
BILIRUB SERPL-MCNC: 0.5 MG/DL — SIGNIFICANT CHANGE UP (ref 0.2–1.2)
BILIRUB UR-MCNC: NEGATIVE — SIGNIFICANT CHANGE UP
BLD GP AB SCN SERPL QL: NEGATIVE — SIGNIFICANT CHANGE UP
BUN SERPL-MCNC: 16 MG/DL — SIGNIFICANT CHANGE UP (ref 7–23)
BUN SERPL-MCNC: 22 MG/DL — SIGNIFICANT CHANGE UP (ref 7–23)
CALCIUM SERPL-MCNC: 8.6 MG/DL — SIGNIFICANT CHANGE UP (ref 8.4–10.5)
CALCIUM SERPL-MCNC: 9.4 MG/DL — SIGNIFICANT CHANGE UP (ref 8.4–10.5)
CHLORIDE SERPL-SCNC: 101 MMOL/L — SIGNIFICANT CHANGE UP (ref 96–108)
CHLORIDE SERPL-SCNC: 106 MMOL/L — SIGNIFICANT CHANGE UP (ref 96–108)
CO2 SERPL-SCNC: 26 MMOL/L — SIGNIFICANT CHANGE UP (ref 22–31)
CO2 SERPL-SCNC: 27 MMOL/L — SIGNIFICANT CHANGE UP (ref 22–31)
COLOR SPEC: YELLOW — SIGNIFICANT CHANGE UP
CREAT SERPL-MCNC: 0.76 MG/DL — SIGNIFICANT CHANGE UP (ref 0.5–1.3)
CREAT SERPL-MCNC: 0.9 MG/DL — SIGNIFICANT CHANGE UP (ref 0.5–1.3)
DIFF PNL FLD: NEGATIVE — SIGNIFICANT CHANGE UP
EGFR: 81 ML/MIN/1.73M2 — SIGNIFICANT CHANGE UP
EGFR: 81 ML/MIN/1.73M2 — SIGNIFICANT CHANGE UP
EGFR: 85 ML/MIN/1.73M2 — SIGNIFICANT CHANGE UP
EGFR: 85 ML/MIN/1.73M2 — SIGNIFICANT CHANGE UP
EOSINOPHIL # BLD AUTO: 0.08 K/UL — SIGNIFICANT CHANGE UP (ref 0–0.5)
EOSINOPHIL # BLD AUTO: 0.16 K/UL — SIGNIFICANT CHANGE UP (ref 0–0.5)
EOSINOPHIL # BLD AUTO: 0.16 K/UL — SIGNIFICANT CHANGE UP (ref 0–0.5)
EOSINOPHIL NFR BLD AUTO: 1.3 % — SIGNIFICANT CHANGE UP (ref 0–6)
EOSINOPHIL NFR BLD AUTO: 2.2 % — SIGNIFICANT CHANGE UP (ref 0–6)
EOSINOPHIL NFR BLD AUTO: 2.3 % — SIGNIFICANT CHANGE UP (ref 0–6)
GLUCOSE SERPL-MCNC: 140 MG/DL — HIGH (ref 70–99)
GLUCOSE SERPL-MCNC: 95 MG/DL — SIGNIFICANT CHANGE UP (ref 70–99)
GLUCOSE UR QL: NEGATIVE MG/DL — SIGNIFICANT CHANGE UP
HCT VFR BLD CALC: 34.3 % — LOW (ref 39–50)
HCT VFR BLD CALC: 37.7 % — LOW (ref 39–50)
HCT VFR BLD CALC: 37.8 % — LOW (ref 39–50)
HGB BLD-MCNC: 11.1 G/DL — LOW (ref 13–17)
HGB BLD-MCNC: 11.8 G/DL — LOW (ref 13–17)
HGB BLD-MCNC: 12.1 G/DL — LOW (ref 13–17)
IMM GRANULOCYTES # BLD AUTO: 0.02 K/UL — SIGNIFICANT CHANGE UP (ref 0–0.07)
IMM GRANULOCYTES NFR BLD AUTO: 0.3 % — SIGNIFICANT CHANGE UP (ref 0–0.9)
INR BLD: 0.97 — SIGNIFICANT CHANGE UP (ref 0.85–1.16)
KETONES UR QL: ABNORMAL MG/DL
LEUKOCYTE ESTERASE UR-ACNC: NEGATIVE — SIGNIFICANT CHANGE UP
LYMPHOCYTES # BLD AUTO: 1.74 K/UL — SIGNIFICANT CHANGE UP (ref 1–3.3)
LYMPHOCYTES # BLD AUTO: 2.34 K/UL — SIGNIFICANT CHANGE UP (ref 1–3.3)
LYMPHOCYTES # BLD AUTO: 2.4 K/UL — SIGNIFICANT CHANGE UP (ref 1–3.3)
LYMPHOCYTES NFR BLD AUTO: 27.3 % — SIGNIFICANT CHANGE UP (ref 13–44)
LYMPHOCYTES NFR BLD AUTO: 31.8 % — SIGNIFICANT CHANGE UP (ref 13–44)
LYMPHOCYTES NFR BLD AUTO: 34.3 % — SIGNIFICANT CHANGE UP (ref 13–44)
MAGNESIUM SERPL-MCNC: 2.2 MG/DL — SIGNIFICANT CHANGE UP (ref 1.6–2.6)
MCHC RBC-ENTMCNC: 31.2 PG — SIGNIFICANT CHANGE UP (ref 27–34)
MCHC RBC-ENTMCNC: 31.3 G/DL — LOW (ref 32–36)
MCHC RBC-ENTMCNC: 31.7 PG — SIGNIFICANT CHANGE UP (ref 27–34)
MCHC RBC-ENTMCNC: 31.9 PG — SIGNIFICANT CHANGE UP (ref 27–34)
MCHC RBC-ENTMCNC: 32 G/DL — SIGNIFICANT CHANGE UP (ref 32–36)
MCHC RBC-ENTMCNC: 32.4 G/DL — SIGNIFICANT CHANGE UP (ref 32–36)
MCV RBC AUTO: 98.6 FL — SIGNIFICANT CHANGE UP (ref 80–100)
MCV RBC AUTO: 99 FL — SIGNIFICANT CHANGE UP (ref 80–100)
MCV RBC AUTO: 99.7 FL — SIGNIFICANT CHANGE UP (ref 80–100)
MONOCYTES # BLD AUTO: 0.67 K/UL — SIGNIFICANT CHANGE UP (ref 0–0.9)
MONOCYTES # BLD AUTO: 0.67 K/UL — SIGNIFICANT CHANGE UP (ref 0–0.9)
MONOCYTES # BLD AUTO: 0.86 K/UL — SIGNIFICANT CHANGE UP (ref 0–0.9)
MONOCYTES NFR BLD AUTO: 10.5 % — SIGNIFICANT CHANGE UP (ref 2–14)
MONOCYTES NFR BLD AUTO: 12.3 % — SIGNIFICANT CHANGE UP (ref 2–14)
MONOCYTES NFR BLD AUTO: 9.1 % — SIGNIFICANT CHANGE UP (ref 2–14)
NEUTROPHILS # BLD AUTO: 3.49 K/UL — SIGNIFICANT CHANGE UP (ref 1.8–7.4)
NEUTROPHILS # BLD AUTO: 3.82 K/UL — SIGNIFICANT CHANGE UP (ref 1.8–7.4)
NEUTROPHILS # BLD AUTO: 4.11 K/UL — SIGNIFICANT CHANGE UP (ref 1.8–7.4)
NEUTROPHILS NFR BLD AUTO: 49.9 % — SIGNIFICANT CHANGE UP (ref 43–77)
NEUTROPHILS NFR BLD AUTO: 55.7 % — SIGNIFICANT CHANGE UP (ref 43–77)
NEUTROPHILS NFR BLD AUTO: 59.8 % — SIGNIFICANT CHANGE UP (ref 43–77)
NITRITE UR-MCNC: NEGATIVE — SIGNIFICANT CHANGE UP
NRBC # BLD AUTO: 0 K/UL — SIGNIFICANT CHANGE UP (ref 0–0)
NRBC # FLD: 0 K/UL — SIGNIFICANT CHANGE UP (ref 0–0)
NRBC BLD AUTO-RTO: 0 /100 WBCS — SIGNIFICANT CHANGE UP (ref 0–0)
PH UR: 6 — SIGNIFICANT CHANGE UP (ref 5–8)
PHOSPHATE SERPL-MCNC: 3.4 MG/DL — SIGNIFICANT CHANGE UP (ref 2.5–4.5)
PLATELET # BLD AUTO: 169 K/UL — SIGNIFICANT CHANGE UP (ref 150–400)
PLATELET # BLD AUTO: 169 K/UL — SIGNIFICANT CHANGE UP (ref 150–400)
PLATELET # BLD AUTO: 195 K/UL — SIGNIFICANT CHANGE UP (ref 150–400)
PMV BLD: 10.3 FL — SIGNIFICANT CHANGE UP (ref 7–13)
POTASSIUM SERPL-MCNC: 4.3 MMOL/L — SIGNIFICANT CHANGE UP (ref 3.5–5.3)
POTASSIUM SERPL-MCNC: 4.3 MMOL/L — SIGNIFICANT CHANGE UP (ref 3.5–5.3)
POTASSIUM SERPL-SCNC: 4.3 MMOL/L — SIGNIFICANT CHANGE UP (ref 3.5–5.3)
POTASSIUM SERPL-SCNC: 4.3 MMOL/L — SIGNIFICANT CHANGE UP (ref 3.5–5.3)
PROT SERPL-MCNC: 6.4 G/DL — SIGNIFICANT CHANGE UP (ref 6–8.3)
PROT SERPL-MCNC: 7.1 G/DL — SIGNIFICANT CHANGE UP (ref 6–8.3)
PROT UR-MCNC: SIGNIFICANT CHANGE UP MG/DL
PROTHROM AB SERPL-ACNC: 11.3 SEC — SIGNIFICANT CHANGE UP (ref 9.9–13.4)
RBC # BLD: 3.48 M/UL — LOW (ref 4.2–5.8)
RBC # BLD: 3.78 M/UL — LOW (ref 4.2–5.8)
RBC # BLD: 3.82 M/UL — LOW (ref 4.2–5.8)
RBC # FLD: 13.7 % — SIGNIFICANT CHANGE UP (ref 10.3–14.5)
RBC # FLD: 13.7 % — SIGNIFICANT CHANGE UP (ref 10.3–14.5)
RBC # FLD: 13.8 % — SIGNIFICANT CHANGE UP (ref 10.3–14.5)
RH IG SCN BLD-IMP: POSITIVE — SIGNIFICANT CHANGE UP
SODIUM SERPL-SCNC: 139 MMOL/L — SIGNIFICANT CHANGE UP (ref 135–145)
SODIUM SERPL-SCNC: 142 MMOL/L — SIGNIFICANT CHANGE UP (ref 135–145)
SP GR SPEC: >1.03 — HIGH (ref 1–1.03)
TROPONIN T, HIGH SENSITIVITY RESULT: 18 NG/L — SIGNIFICANT CHANGE UP (ref 0–51)
UROBILINOGEN FLD QL: 1 MG/DL — SIGNIFICANT CHANGE UP (ref 0.2–1)
WBC # BLD: 6.38 K/UL — SIGNIFICANT CHANGE UP (ref 3.8–10.5)
WBC # BLD: 6.99 K/UL — SIGNIFICANT CHANGE UP (ref 3.8–10.5)
WBC # BLD: 7.37 K/UL — SIGNIFICANT CHANGE UP (ref 3.8–10.5)
WBC # FLD AUTO: 6.38 K/UL — SIGNIFICANT CHANGE UP (ref 3.8–10.5)
WBC # FLD AUTO: 6.99 K/UL — SIGNIFICANT CHANGE UP (ref 3.8–10.5)
WBC # FLD AUTO: 7.37 K/UL — SIGNIFICANT CHANGE UP (ref 3.8–10.5)

## 2025-08-01 PROCEDURE — 84100 ASSAY OF PHOSPHORUS: CPT

## 2025-08-01 PROCEDURE — 85025 COMPLETE CBC W/AUTO DIFF WBC: CPT

## 2025-08-01 PROCEDURE — 84484 ASSAY OF TROPONIN QUANT: CPT

## 2025-08-01 PROCEDURE — 36415 COLL VENOUS BLD VENIPUNCTURE: CPT

## 2025-08-01 PROCEDURE — 93005 ELECTROCARDIOGRAM TRACING: CPT

## 2025-08-01 PROCEDURE — 83605 ASSAY OF LACTIC ACID: CPT

## 2025-08-01 PROCEDURE — 81003 URINALYSIS AUTO W/O SCOPE: CPT

## 2025-08-01 PROCEDURE — 85730 THROMBOPLASTIN TIME PARTIAL: CPT

## 2025-08-01 PROCEDURE — 80053 COMPREHEN METABOLIC PANEL: CPT

## 2025-08-01 PROCEDURE — 99283 EMERGENCY DEPT VISIT LOW MDM: CPT | Mod: 25

## 2025-08-01 PROCEDURE — 99284 EMERGENCY DEPT VISIT MOD MDM: CPT

## 2025-08-01 PROCEDURE — 83735 ASSAY OF MAGNESIUM: CPT

## 2025-08-01 PROCEDURE — 86900 BLOOD TYPING SEROLOGIC ABO: CPT

## 2025-08-01 PROCEDURE — 99497 ADVNCD CARE PLAN 30 MIN: CPT | Mod: GC,25

## 2025-08-01 PROCEDURE — 99285 EMERGENCY DEPT VISIT HI MDM: CPT

## 2025-08-01 PROCEDURE — 86850 RBC ANTIBODY SCREEN: CPT

## 2025-08-01 PROCEDURE — 85027 COMPLETE CBC AUTOMATED: CPT

## 2025-08-01 PROCEDURE — 74177 CT ABD & PELVIS W/CONTRAST: CPT

## 2025-08-01 PROCEDURE — 85610 PROTHROMBIN TIME: CPT

## 2025-08-01 PROCEDURE — 82962 GLUCOSE BLOOD TEST: CPT

## 2025-08-01 PROCEDURE — 99233 SBSQ HOSP IP/OBS HIGH 50: CPT | Mod: GC

## 2025-08-01 PROCEDURE — 86901 BLOOD TYPING SEROLOGIC RH(D): CPT

## 2025-08-01 PROCEDURE — 36000 PLACE NEEDLE IN VEIN: CPT

## 2025-08-01 PROCEDURE — 84132 ASSAY OF SERUM POTASSIUM: CPT

## 2025-08-01 RX ORDER — MAGNESIUM CITRATE
296 SOLUTION, ORAL ORAL ONCE
Refills: 0 | Status: COMPLETED | OUTPATIENT
Start: 2025-08-01 | End: 2025-08-01

## 2025-08-01 RX ADMIN — Medication 1000 MILLILITER(S): at 20:50

## 2025-08-01 RX ADMIN — Medication 1000 MILLILITER(S): at 19:34

## 2025-08-05 DIAGNOSIS — J44.9 CHRONIC OBSTRUCTIVE PULMONARY DISEASE, UNSPECIFIED: ICD-10-CM

## 2025-08-05 DIAGNOSIS — R55 SYNCOPE AND COLLAPSE: ICD-10-CM

## 2025-08-05 DIAGNOSIS — H40.9 UNSPECIFIED GLAUCOMA: ICD-10-CM

## 2025-08-05 DIAGNOSIS — K57.90 DIVERTICULOSIS OF INTESTINE, PART UNSPECIFIED, WITHOUT PERFORATION OR ABSCESS WITHOUT BLEEDING: ICD-10-CM

## 2025-08-05 DIAGNOSIS — I10 ESSENTIAL (PRIMARY) HYPERTENSION: ICD-10-CM

## 2025-08-05 DIAGNOSIS — R63.0 ANOREXIA: ICD-10-CM

## 2025-08-08 DIAGNOSIS — H40.9 UNSPECIFIED GLAUCOMA: ICD-10-CM

## 2025-08-08 DIAGNOSIS — K62.5 HEMORRHAGE OF ANUS AND RECTUM: ICD-10-CM

## 2025-08-08 DIAGNOSIS — J44.9 CHRONIC OBSTRUCTIVE PULMONARY DISEASE, UNSPECIFIED: ICD-10-CM

## 2025-08-08 DIAGNOSIS — E87.5 HYPERKALEMIA: ICD-10-CM

## 2025-08-08 DIAGNOSIS — D62 ACUTE POSTHEMORRHAGIC ANEMIA: ICD-10-CM

## 2025-08-08 DIAGNOSIS — K57.90 DIVERTICULOSIS OF INTESTINE, PART UNSPECIFIED, WITHOUT PERFORATION OR ABSCESS WITHOUT BLEEDING: ICD-10-CM

## 2025-08-08 DIAGNOSIS — I95.1 ORTHOSTATIC HYPOTENSION: ICD-10-CM

## 2025-08-08 DIAGNOSIS — K64.9 UNSPECIFIED HEMORRHOIDS: ICD-10-CM

## 2025-08-08 DIAGNOSIS — K59.00 CONSTIPATION, UNSPECIFIED: ICD-10-CM

## 2025-08-08 DIAGNOSIS — G47.33 OBSTRUCTIVE SLEEP APNEA (ADULT) (PEDIATRIC): ICD-10-CM
